# Patient Record
Sex: MALE | Race: WHITE | NOT HISPANIC OR LATINO | Employment: OTHER | URBAN - METROPOLITAN AREA
[De-identification: names, ages, dates, MRNs, and addresses within clinical notes are randomized per-mention and may not be internally consistent; named-entity substitution may affect disease eponyms.]

---

## 2021-01-01 ENCOUNTER — APPOINTMENT (OUTPATIENT)
Dept: EDUCATION SERVICES | Facility: CLINIC | Age: 56
DRG: 286 | End: 2021-01-01
Attending: NURSE PRACTITIONER
Payer: MEDICARE

## 2021-01-01 ENCOUNTER — HOME INFUSION (PRE-WILLOW HOME INFUSION) (OUTPATIENT)
Dept: PHARMACY | Facility: CLINIC | Age: 56
End: 2021-01-01
Payer: MEDICARE

## 2021-01-01 ENCOUNTER — HOME INFUSION (PRE-WILLOW HOME INFUSION) (OUTPATIENT)
Dept: PHARMACY | Facility: CLINIC | Age: 56
End: 2021-01-01

## 2021-01-01 ENCOUNTER — APPOINTMENT (OUTPATIENT)
Dept: ULTRASOUND IMAGING | Facility: CLINIC | Age: 56
DRG: 286 | End: 2021-01-01
Attending: NURSE PRACTITIONER
Payer: MEDICARE

## 2021-01-01 ENCOUNTER — PATIENT OUTREACH (OUTPATIENT)
Dept: CARDIOLOGY | Facility: CLINIC | Age: 56
End: 2021-01-01
Payer: MEDICARE

## 2021-01-01 ENCOUNTER — OFFICE VISIT (OUTPATIENT)
Dept: CARDIOLOGY | Facility: CLINIC | Age: 56
End: 2021-01-01
Attending: NURSE PRACTITIONER
Payer: MEDICARE

## 2021-01-01 ENCOUNTER — APPOINTMENT (OUTPATIENT)
Dept: GENERAL RADIOLOGY | Facility: CLINIC | Age: 56
DRG: 286 | End: 2021-01-01
Attending: STUDENT IN AN ORGANIZED HEALTH CARE EDUCATION/TRAINING PROGRAM
Payer: MEDICARE

## 2021-01-01 ENCOUNTER — TELEPHONE (OUTPATIENT)
Dept: CARDIOLOGY | Facility: CLINIC | Age: 56
End: 2021-01-01
Payer: MEDICARE

## 2021-01-01 ENCOUNTER — PATIENT OUTREACH (OUTPATIENT)
Dept: CARDIOLOGY | Facility: CLINIC | Age: 56
End: 2021-01-01

## 2021-01-01 ENCOUNTER — APPOINTMENT (OUTPATIENT)
Dept: CARDIOLOGY | Facility: CLINIC | Age: 56
DRG: 286 | End: 2021-01-01
Attending: STUDENT IN AN ORGANIZED HEALTH CARE EDUCATION/TRAINING PROGRAM
Payer: MEDICARE

## 2021-01-01 ENCOUNTER — APPOINTMENT (OUTPATIENT)
Dept: CARDIOLOGY | Facility: CLINIC | Age: 56
DRG: 286 | End: 2021-01-01
Attending: NURSE PRACTITIONER
Payer: MEDICARE

## 2021-01-01 ENCOUNTER — HOSPITAL ENCOUNTER (INPATIENT)
Facility: CLINIC | Age: 56
LOS: 11 days | Discharge: HOME HEALTH | DRG: 286 | End: 2021-11-12
Attending: STUDENT IN AN ORGANIZED HEALTH CARE EDUCATION/TRAINING PROGRAM | Admitting: INTERNAL MEDICINE
Payer: MEDICARE

## 2021-01-01 ENCOUNTER — PRE VISIT (OUTPATIENT)
Dept: RHEUMATOLOGY | Facility: CLINIC | Age: 56
End: 2021-01-01

## 2021-01-01 ENCOUNTER — NURSE TRIAGE (OUTPATIENT)
Dept: NURSING | Facility: CLINIC | Age: 56
End: 2021-01-01

## 2021-01-01 ENCOUNTER — HOSPITAL ENCOUNTER (INPATIENT)
Facility: CLINIC | Age: 56
LOS: 8 days | Discharge: HOME HEALTH | DRG: 286 | End: 2021-12-09
Attending: INTERNAL MEDICINE | Admitting: INTERNAL MEDICINE
Payer: MEDICARE

## 2021-01-01 ENCOUNTER — APPOINTMENT (OUTPATIENT)
Dept: CT IMAGING | Facility: CLINIC | Age: 56
DRG: 286 | End: 2021-01-01
Attending: NURSE PRACTITIONER
Payer: MEDICARE

## 2021-01-01 ENCOUNTER — CARE COORDINATION (OUTPATIENT)
Dept: CARDIOLOGY | Facility: CLINIC | Age: 56
End: 2021-01-01

## 2021-01-01 ENCOUNTER — LAB REQUISITION (OUTPATIENT)
Dept: LAB | Facility: CLINIC | Age: 56
End: 2021-01-01
Payer: MEDICARE

## 2021-01-01 ENCOUNTER — APPOINTMENT (OUTPATIENT)
Dept: GENERAL RADIOLOGY | Facility: CLINIC | Age: 56
DRG: 286 | End: 2021-01-01
Attending: INTERNAL MEDICINE
Payer: MEDICARE

## 2021-01-01 ENCOUNTER — APPOINTMENT (OUTPATIENT)
Dept: GENERAL RADIOLOGY | Facility: CLINIC | Age: 56
End: 2021-01-01
Attending: EMERGENCY MEDICINE
Payer: MEDICARE

## 2021-01-01 ENCOUNTER — TELEPHONE (OUTPATIENT)
Dept: RHEUMATOLOGY | Facility: CLINIC | Age: 56
End: 2021-01-01
Payer: MEDICARE

## 2021-01-01 ENCOUNTER — ALLIED HEALTH/NURSE VISIT (OUTPATIENT)
Dept: CARDIOLOGY | Facility: CLINIC | Age: 56
End: 2021-01-01
Attending: INTERNAL MEDICINE
Payer: MEDICARE

## 2021-01-01 ENCOUNTER — OFFICE VISIT (OUTPATIENT)
Dept: NEUROPSYCHOLOGY | Facility: CLINIC | Age: 56
End: 2021-01-01
Attending: INTERNAL MEDICINE
Payer: MEDICARE

## 2021-01-01 ENCOUNTER — APPOINTMENT (OUTPATIENT)
Dept: OCCUPATIONAL THERAPY | Facility: CLINIC | Age: 56
DRG: 286 | End: 2021-01-01
Attending: STUDENT IN AN ORGANIZED HEALTH CARE EDUCATION/TRAINING PROGRAM
Payer: MEDICARE

## 2021-01-01 ENCOUNTER — APPOINTMENT (OUTPATIENT)
Dept: OCCUPATIONAL THERAPY | Facility: CLINIC | Age: 56
DRG: 286 | End: 2021-01-01
Payer: MEDICARE

## 2021-01-01 ENCOUNTER — PRE VISIT (OUTPATIENT)
Dept: ORTHOPEDICS | Facility: CLINIC | Age: 56
End: 2021-01-01
Payer: MEDICARE

## 2021-01-01 ENCOUNTER — APPOINTMENT (OUTPATIENT)
Dept: PHYSICAL THERAPY | Facility: CLINIC | Age: 56
DRG: 286 | End: 2021-01-01
Attending: INTERNAL MEDICINE
Payer: MEDICARE

## 2021-01-01 ENCOUNTER — LAB (OUTPATIENT)
Dept: LAB | Facility: CLINIC | Age: 56
End: 2021-01-01
Payer: MEDICARE

## 2021-01-01 ENCOUNTER — APPOINTMENT (OUTPATIENT)
Dept: PHYSICAL THERAPY | Facility: CLINIC | Age: 56
DRG: 286 | End: 2021-01-01
Attending: NURSE PRACTITIONER
Payer: MEDICARE

## 2021-01-01 ENCOUNTER — APPOINTMENT (OUTPATIENT)
Dept: GENERAL RADIOLOGY | Facility: CLINIC | Age: 56
DRG: 286 | End: 2021-01-01
Attending: NURSE PRACTITIONER
Payer: MEDICARE

## 2021-01-01 ENCOUNTER — HOSPITAL ENCOUNTER (EMERGENCY)
Facility: CLINIC | Age: 56
Discharge: HOME | End: 2021-11-18
Attending: EMERGENCY MEDICINE | Admitting: EMERGENCY MEDICINE
Payer: MEDICARE

## 2021-01-01 ENCOUNTER — TELEPHONE (OUTPATIENT)
Dept: MULTI SPECIALTY CLINIC | Facility: CLINIC | Age: 56
End: 2021-01-01
Payer: MEDICARE

## 2021-01-01 ENCOUNTER — CARE COORDINATION (OUTPATIENT)
Dept: CARDIOLOGY | Facility: CLINIC | Age: 56
End: 2021-01-01
Payer: MEDICARE

## 2021-01-01 ENCOUNTER — PATIENT OUTREACH (OUTPATIENT)
Dept: CARE COORDINATION | Facility: CLINIC | Age: 56
End: 2021-01-01
Payer: MEDICARE

## 2021-01-01 ENCOUNTER — TELEPHONE (OUTPATIENT)
Dept: CARDIOLOGY | Facility: CLINIC | Age: 56
End: 2021-01-01

## 2021-01-01 ENCOUNTER — OFFICE VISIT (OUTPATIENT)
Dept: FAMILY MEDICINE | Facility: CLINIC | Age: 56
End: 2021-01-01
Payer: MEDICARE

## 2021-01-01 VITALS
OXYGEN SATURATION: 98 % | HEIGHT: 71 IN | DIASTOLIC BLOOD PRESSURE: 66 MMHG | HEART RATE: 95 BPM | BODY MASS INDEX: 26.54 KG/M2 | WEIGHT: 189.6 LBS | SYSTOLIC BLOOD PRESSURE: 104 MMHG

## 2021-01-01 VITALS
OXYGEN SATURATION: 100 % | SYSTOLIC BLOOD PRESSURE: 106 MMHG | RESPIRATION RATE: 12 BRPM | BODY MASS INDEX: 29.03 KG/M2 | DIASTOLIC BLOOD PRESSURE: 85 MMHG | WEIGHT: 219 LBS | TEMPERATURE: 97.9 F | HEART RATE: 97 BPM | HEIGHT: 73 IN

## 2021-01-01 VITALS
TEMPERATURE: 97.6 F | BODY MASS INDEX: 25.46 KG/M2 | WEIGHT: 193 LBS | DIASTOLIC BLOOD PRESSURE: 65 MMHG | OXYGEN SATURATION: 94 % | SYSTOLIC BLOOD PRESSURE: 99 MMHG | HEART RATE: 102 BPM | RESPIRATION RATE: 18 BRPM

## 2021-01-01 VITALS
HEIGHT: 73 IN | DIASTOLIC BLOOD PRESSURE: 67 MMHG | WEIGHT: 198.2 LBS | BODY MASS INDEX: 26.27 KG/M2 | SYSTOLIC BLOOD PRESSURE: 91 MMHG | OXYGEN SATURATION: 99 % | HEART RATE: 93 BPM

## 2021-01-01 VITALS
RESPIRATION RATE: 16 BRPM | BODY MASS INDEX: 25.87 KG/M2 | SYSTOLIC BLOOD PRESSURE: 100 MMHG | HEART RATE: 92 BPM | HEIGHT: 73 IN | WEIGHT: 195.2 LBS | TEMPERATURE: 97.6 F | OXYGEN SATURATION: 96 % | DIASTOLIC BLOOD PRESSURE: 72 MMHG

## 2021-01-01 VITALS
DIASTOLIC BLOOD PRESSURE: 63 MMHG | SYSTOLIC BLOOD PRESSURE: 93 MMHG | BODY MASS INDEX: 27.25 KG/M2 | HEIGHT: 73 IN | WEIGHT: 205.6 LBS | HEART RATE: 90 BPM | OXYGEN SATURATION: 97 %

## 2021-01-01 VITALS
HEART RATE: 97 BPM | SYSTOLIC BLOOD PRESSURE: 108 MMHG | OXYGEN SATURATION: 98 % | BODY MASS INDEX: 26.88 KG/M2 | HEIGHT: 73 IN | DIASTOLIC BLOOD PRESSURE: 73 MMHG | WEIGHT: 202.8 LBS

## 2021-01-01 VITALS
DIASTOLIC BLOOD PRESSURE: 73 MMHG | BODY MASS INDEX: 26.6 KG/M2 | WEIGHT: 201.6 LBS | SYSTOLIC BLOOD PRESSURE: 100 MMHG | HEART RATE: 63 BPM | TEMPERATURE: 97.4 F

## 2021-01-01 DIAGNOSIS — I50.23 ACUTE ON CHRONIC SYSTOLIC CONGESTIVE HEART FAILURE (H): ICD-10-CM

## 2021-01-01 DIAGNOSIS — I50.22 CHRONIC SYSTOLIC HEART FAILURE (H): ICD-10-CM

## 2021-01-01 DIAGNOSIS — I50.9 HEART FAILURE, UNSPECIFIED (H): ICD-10-CM

## 2021-01-01 DIAGNOSIS — R57.0 CARDIOGENIC SHOCK (H): Primary | ICD-10-CM

## 2021-01-01 DIAGNOSIS — Z11.52 ENCOUNTER FOR SCREENING LABORATORY TESTING FOR SEVERE ACUTE RESPIRATORY SYNDROME CORONAVIRUS 2 (SARS-COV-2): ICD-10-CM

## 2021-01-01 DIAGNOSIS — I50.9 HEART FAILURE (H): ICD-10-CM

## 2021-01-01 DIAGNOSIS — R57.0 CARDIOGENIC SHOCK (H): ICD-10-CM

## 2021-01-01 DIAGNOSIS — I50.9 HEART FAILURE (H): Primary | ICD-10-CM

## 2021-01-01 DIAGNOSIS — I50.22 CHRONIC SYSTOLIC CONGESTIVE HEART FAILURE (H): Primary | ICD-10-CM

## 2021-01-01 DIAGNOSIS — I42.8 NONISCHEMIC CARDIOMYOPATHY (H): Primary | ICD-10-CM

## 2021-01-01 DIAGNOSIS — M1A.49X1 OTHER SECONDARY CHRONIC GOUT OF MULTIPLE SITES WITH TOPHUS: ICD-10-CM

## 2021-01-01 DIAGNOSIS — F55.8 ABUSE OF OTHER NON-PSYCHOACTIVE SUBSTANCES: ICD-10-CM

## 2021-01-01 DIAGNOSIS — I42.9 CARDIOMYOPATHY (H): ICD-10-CM

## 2021-01-01 DIAGNOSIS — I50.23 ACUTE ON CHRONIC SYSTOLIC CONGESTIVE HEART FAILURE (H): Primary | ICD-10-CM

## 2021-01-01 DIAGNOSIS — M1A.9XX0 CHRONIC GOUT: ICD-10-CM

## 2021-01-01 DIAGNOSIS — I42.8 NONISCHEMIC CARDIOMYOPATHY (H): ICD-10-CM

## 2021-01-01 DIAGNOSIS — Z71.89 OTHER SPECIFIED COUNSELING: ICD-10-CM

## 2021-01-01 DIAGNOSIS — I42.9 CARDIOMYOPATHY (H): Primary | ICD-10-CM

## 2021-01-01 DIAGNOSIS — F19.10 SUBSTANCE ABUSE (H): ICD-10-CM

## 2021-01-01 DIAGNOSIS — F32.A DEPRESSION, UNSPECIFIED DEPRESSION TYPE: ICD-10-CM

## 2021-01-01 DIAGNOSIS — Z01.818 PREOP EXAMINATION: ICD-10-CM

## 2021-01-01 DIAGNOSIS — G47.09 OTHER INSOMNIA: ICD-10-CM

## 2021-01-01 DIAGNOSIS — R06.02 SHORTNESS OF BREATH: ICD-10-CM

## 2021-01-01 DIAGNOSIS — M1A.0390 CHRONIC GOUT OF WRIST, UNSPECIFIED CAUSE, UNSPECIFIED LATERALITY: ICD-10-CM

## 2021-01-01 DIAGNOSIS — F33.1 MAJOR DEPRESSIVE DISORDER, RECURRENT EPISODE, MODERATE (H): Primary | ICD-10-CM

## 2021-01-01 DIAGNOSIS — M67.439 GANGLION OF WRIST, UNSPECIFIED LATERALITY: Primary | ICD-10-CM

## 2021-01-01 DIAGNOSIS — Z12.11 SCREEN FOR COLON CANCER: ICD-10-CM

## 2021-01-01 DIAGNOSIS — I50.22 CHRONIC SYSTOLIC HEART FAILURE (H): Primary | ICD-10-CM

## 2021-01-01 DIAGNOSIS — K21.9 GASTROESOPHAGEAL REFLUX DISEASE, UNSPECIFIED WHETHER ESOPHAGITIS PRESENT: ICD-10-CM

## 2021-01-01 DIAGNOSIS — R11.0 NAUSEA: ICD-10-CM

## 2021-01-01 DIAGNOSIS — I50.23 ACUTE ON CHRONIC SYSTOLIC (CONGESTIVE) HEART FAILURE (H): ICD-10-CM

## 2021-01-01 DIAGNOSIS — M1A.00X1 IDIOPATHIC CHRONIC GOUT WITH TOPHUS, UNSPECIFIED SITE: ICD-10-CM

## 2021-01-01 DIAGNOSIS — I50.9 ACUTE ON CHRONIC CONGESTIVE HEART FAILURE, UNSPECIFIED HEART FAILURE TYPE (H): ICD-10-CM

## 2021-01-01 DIAGNOSIS — E11.9 DIABETES MELLITUS TYPE 2, NONINSULIN DEPENDENT (H): ICD-10-CM

## 2021-01-01 DIAGNOSIS — F41.9 ANXIETY: ICD-10-CM

## 2021-01-01 DIAGNOSIS — F41.9 ANXIOUSNESS: ICD-10-CM

## 2021-01-01 LAB
ABO/RH(D): NORMAL
ALBUMIN SERPL-MCNC: 2.7 G/DL (ref 3.4–5)
ALBUMIN SERPL-MCNC: 2.8 G/DL (ref 3.4–5)
ALBUMIN SERPL-MCNC: 2.9 G/DL (ref 3.4–5)
ALBUMIN SERPL-MCNC: 3 G/DL (ref 3.4–5)
ALBUMIN SERPL-MCNC: 3.1 G/DL (ref 3.4–5)
ALBUMIN SERPL-MCNC: 3.2 G/DL (ref 3.4–5)
ALBUMIN SERPL-MCNC: 3.3 G/DL (ref 3.4–5)
ALBUMIN SERPL-MCNC: 3.3 G/DL (ref 3.4–5)
ALBUMIN SERPL-MCNC: 3.4 G/DL (ref 3.4–5)
ALBUMIN SERPL-MCNC: 4 G/DL (ref 3.4–5)
ALBUMIN UR-MCNC: NEGATIVE MG/DL
ALP SERPL-CCNC: 101 U/L (ref 40–150)
ALP SERPL-CCNC: 102 U/L (ref 40–150)
ALP SERPL-CCNC: 103 U/L (ref 40–150)
ALP SERPL-CCNC: 107 U/L (ref 40–150)
ALP SERPL-CCNC: 109 U/L (ref 40–150)
ALP SERPL-CCNC: 110 U/L (ref 40–150)
ALP SERPL-CCNC: 111 U/L (ref 40–150)
ALP SERPL-CCNC: 111 U/L (ref 40–150)
ALP SERPL-CCNC: 113 U/L (ref 40–150)
ALP SERPL-CCNC: 114 U/L (ref 40–150)
ALP SERPL-CCNC: 114 U/L (ref 40–150)
ALP SERPL-CCNC: 115 U/L (ref 40–150)
ALP SERPL-CCNC: 116 U/L (ref 40–150)
ALP SERPL-CCNC: 116 U/L (ref 40–150)
ALP SERPL-CCNC: 117 U/L (ref 40–150)
ALP SERPL-CCNC: 118 U/L (ref 40–150)
ALP SERPL-CCNC: 119 U/L (ref 40–150)
ALP SERPL-CCNC: 120 U/L (ref 40–150)
ALP SERPL-CCNC: 122 U/L (ref 40–150)
ALP SERPL-CCNC: 123 U/L (ref 40–150)
ALP SERPL-CCNC: 123 U/L (ref 40–150)
ALP SERPL-CCNC: 124 U/L (ref 40–150)
ALP SERPL-CCNC: 126 U/L (ref 40–150)
ALP SERPL-CCNC: 128 U/L (ref 40–150)
ALP SERPL-CCNC: 129 U/L (ref 40–150)
ALP SERPL-CCNC: 132 U/L (ref 40–150)
ALP SERPL-CCNC: 133 U/L (ref 40–150)
ALP SERPL-CCNC: 134 U/L (ref 40–150)
ALP SERPL-CCNC: 152 U/L (ref 40–150)
ALT SERPL W P-5'-P-CCNC: 13 U/L (ref 0–70)
ALT SERPL W P-5'-P-CCNC: 14 U/L (ref 0–70)
ALT SERPL W P-5'-P-CCNC: 15 U/L (ref 0–70)
ALT SERPL W P-5'-P-CCNC: 16 U/L (ref 0–70)
ALT SERPL W P-5'-P-CCNC: 17 U/L (ref 0–70)
ALT SERPL W P-5'-P-CCNC: 17 U/L (ref 0–70)
ALT SERPL W P-5'-P-CCNC: 18 U/L (ref 0–70)
ALT SERPL W P-5'-P-CCNC: 19 U/L (ref 0–70)
ALT SERPL W P-5'-P-CCNC: 21 U/L (ref 0–70)
ALT SERPL W P-5'-P-CCNC: 21 U/L (ref 0–70)
ALT SERPL W P-5'-P-CCNC: 22 U/L (ref 0–70)
ALT SERPL W P-5'-P-CCNC: 25 U/L (ref 0–70)
ALT SERPL W P-5'-P-CCNC: 25 U/L (ref 0–70)
ALT SERPL W P-5'-P-CCNC: 26 U/L (ref 0–70)
ALT SERPL W P-5'-P-CCNC: 29 U/L (ref 0–70)
ALT SERPL W P-5'-P-CCNC: 31 U/L (ref 0–70)
ALT SERPL W P-5'-P-CCNC: 32 U/L (ref 0–70)
AMPHETAMINES UR QL SCN: NORMAL
AMYLASE SERPL-CCNC: 23 U/L (ref 30–110)
ANION GAP SERPL CALCULATED.3IONS-SCNC: 10 MMOL/L (ref 3–14)
ANION GAP SERPL CALCULATED.3IONS-SCNC: 11 MMOL/L (ref 3–14)
ANION GAP SERPL CALCULATED.3IONS-SCNC: 12 MMOL/L (ref 3–14)
ANION GAP SERPL CALCULATED.3IONS-SCNC: 4 MMOL/L (ref 3–14)
ANION GAP SERPL CALCULATED.3IONS-SCNC: 4 MMOL/L (ref 3–14)
ANION GAP SERPL CALCULATED.3IONS-SCNC: 5 MMOL/L (ref 3–14)
ANION GAP SERPL CALCULATED.3IONS-SCNC: 5 MMOL/L (ref 3–14)
ANION GAP SERPL CALCULATED.3IONS-SCNC: 6 MMOL/L (ref 3–14)
ANION GAP SERPL CALCULATED.3IONS-SCNC: 7 MMOL/L (ref 3–14)
ANION GAP SERPL CALCULATED.3IONS-SCNC: 8 MMOL/L (ref 3–14)
ANION GAP SERPL CALCULATED.3IONS-SCNC: 9 MMOL/L (ref 3–14)
ANTIBODY SCREEN: NEGATIVE
APPEARANCE UR: CLEAR
APTT PPP: 35 SECONDS (ref 22–38)
AST SERPL W P-5'-P-CCNC: 11 U/L (ref 0–45)
AST SERPL W P-5'-P-CCNC: 13 U/L (ref 0–45)
AST SERPL W P-5'-P-CCNC: 15 U/L (ref 0–45)
AST SERPL W P-5'-P-CCNC: 15 U/L (ref 0–45)
AST SERPL W P-5'-P-CCNC: 16 U/L (ref 0–45)
AST SERPL W P-5'-P-CCNC: 17 U/L (ref 0–45)
AST SERPL W P-5'-P-CCNC: 18 U/L (ref 0–45)
AST SERPL W P-5'-P-CCNC: 18 U/L (ref 0–45)
AST SERPL W P-5'-P-CCNC: 19 U/L (ref 0–45)
AST SERPL W P-5'-P-CCNC: 20 U/L (ref 0–45)
AST SERPL W P-5'-P-CCNC: 21 U/L (ref 0–45)
AST SERPL W P-5'-P-CCNC: 22 U/L (ref 0–45)
AST SERPL W P-5'-P-CCNC: 24 U/L (ref 0–45)
AST SERPL W P-5'-P-CCNC: 24 U/L (ref 0–45)
AST SERPL W P-5'-P-CCNC: 26 U/L (ref 0–45)
AST SERPL W P-5'-P-CCNC: 27 U/L (ref 0–45)
AST SERPL W P-5'-P-CCNC: 37 U/L (ref 0–45)
AST SERPL W P-5'-P-CCNC: 41 U/L (ref 0–45)
AST SERPL W P-5'-P-CCNC: 9 U/L (ref 0–45)
ATRIAL RATE - MUSE: 102 BPM
ATRIAL RATE - MUSE: 109 BPM
ATRIAL RATE - MUSE: 116 BPM
ATRIAL RATE - MUSE: 86 BPM
ATRIAL RATE - MUSE: 91 BPM
ATRIAL RATE - MUSE: 94 BPM
BARBITURATES UR QL: NORMAL
BASE EXCESS BLDV CALC-SCNC: -0.1 MMOL/L (ref -7.7–1.9)
BASE EXCESS BLDV CALC-SCNC: -0.1 MMOL/L (ref -7.7–1.9)
BASE EXCESS BLDV CALC-SCNC: -0.2 MMOL/L (ref -7.7–1.9)
BASE EXCESS BLDV CALC-SCNC: -0.3 MMOL/L (ref -7.7–1.9)
BASE EXCESS BLDV CALC-SCNC: -3 MMOL/L (ref -7.7–1.9)
BASE EXCESS BLDV CALC-SCNC: -7 MMOL/L (ref -7.7–1.9)
BASE EXCESS BLDV CALC-SCNC: 0 MMOL/L (ref -7.7–1.9)
BASE EXCESS BLDV CALC-SCNC: 0.3 MMOL/L (ref -7.7–1.9)
BASE EXCESS BLDV CALC-SCNC: 0.4 MMOL/L (ref -7.7–1.9)
BASE EXCESS BLDV CALC-SCNC: 1.1 MMOL/L (ref -7.7–1.9)
BASE EXCESS BLDV CALC-SCNC: 1.2 MMOL/L (ref -7.7–1.9)
BASE EXCESS BLDV CALC-SCNC: 1.3 MMOL/L (ref -7.7–1.9)
BASE EXCESS BLDV CALC-SCNC: 1.6 MMOL/L (ref -7.7–1.9)
BASE EXCESS BLDV CALC-SCNC: 1.6 MMOL/L (ref -7.7–1.9)
BASE EXCESS BLDV CALC-SCNC: 1.8 MMOL/L (ref -7.7–1.9)
BASE EXCESS BLDV CALC-SCNC: 1.9 MMOL/L (ref -7.7–1.9)
BASE EXCESS BLDV CALC-SCNC: 2 MMOL/L (ref -7.7–1.9)
BASE EXCESS BLDV CALC-SCNC: 2.1 MMOL/L (ref -7.7–1.9)
BASE EXCESS BLDV CALC-SCNC: 2.3 MMOL/L (ref -7.7–1.9)
BASE EXCESS BLDV CALC-SCNC: 2.4 MMOL/L (ref -7.7–1.9)
BASE EXCESS BLDV CALC-SCNC: 2.5 MMOL/L (ref -7.7–1.9)
BASE EXCESS BLDV CALC-SCNC: 2.5 MMOL/L (ref -7.7–1.9)
BASE EXCESS BLDV CALC-SCNC: 2.6 MMOL/L (ref -7.7–1.9)
BASE EXCESS BLDV CALC-SCNC: 2.7 MMOL/L (ref -7.7–1.9)
BASE EXCESS BLDV CALC-SCNC: 2.8 MMOL/L (ref -7.7–1.9)
BASE EXCESS BLDV CALC-SCNC: 2.8 MMOL/L (ref -7.7–1.9)
BASE EXCESS BLDV CALC-SCNC: 3 MMOL/L (ref -7.7–1.9)
BASE EXCESS BLDV CALC-SCNC: 3.2 MMOL/L (ref -7.7–1.9)
BASE EXCESS BLDV CALC-SCNC: 3.3 MMOL/L (ref -7.7–1.9)
BASE EXCESS BLDV CALC-SCNC: 3.3 MMOL/L (ref -7.7–1.9)
BASE EXCESS BLDV CALC-SCNC: 3.5 MMOL/L (ref -7.7–1.9)
BASE EXCESS BLDV CALC-SCNC: 3.6 MMOL/L (ref -7.7–1.9)
BASE EXCESS BLDV CALC-SCNC: 3.7 MMOL/L (ref -7.7–1.9)
BASE EXCESS BLDV CALC-SCNC: 3.9 MMOL/L (ref -7.7–1.9)
BASE EXCESS BLDV CALC-SCNC: 3.9 MMOL/L (ref -7.7–1.9)
BASE EXCESS BLDV CALC-SCNC: 4.3 MMOL/L (ref -7.7–1.9)
BASE EXCESS BLDV CALC-SCNC: 4.5 MMOL/L (ref -7.7–1.9)
BASE EXCESS BLDV CALC-SCNC: 4.6 MMOL/L (ref -7.7–1.9)
BASE EXCESS BLDV CALC-SCNC: 4.9 MMOL/L (ref -7.7–1.9)
BASE EXCESS BLDV CALC-SCNC: 5.6 MMOL/L (ref -7.7–1.9)
BASE EXCESS BLDV CALC-SCNC: 6.3 MMOL/L (ref -7.7–1.9)
BASOPHILS # BLD AUTO: 0.1 10E3/UL (ref 0–0.2)
BASOPHILS # BLD AUTO: 0.1 10E3/UL (ref 0–0.2)
BASOPHILS NFR BLD AUTO: 1 %
BASOPHILS NFR BLD AUTO: 1 %
BENZODIAZ UR QL: NORMAL
BILIRUB DIRECT SERPL-MCNC: 0.3 MG/DL (ref 0–0.2)
BILIRUB SERPL-MCNC: 0.5 MG/DL (ref 0.2–1.3)
BILIRUB SERPL-MCNC: 0.5 MG/DL (ref 0.2–1.3)
BILIRUB SERPL-MCNC: 0.6 MG/DL (ref 0.2–1.3)
BILIRUB SERPL-MCNC: 0.7 MG/DL (ref 0.2–1.3)
BILIRUB SERPL-MCNC: 0.8 MG/DL (ref 0.2–1.3)
BILIRUB SERPL-MCNC: 1 MG/DL (ref 0.2–1.3)
BILIRUB SERPL-MCNC: 1.1 MG/DL (ref 0.2–1.3)
BILIRUB SERPL-MCNC: 1.2 MG/DL (ref 0.2–1.3)
BILIRUB SERPL-MCNC: 1.2 MG/DL (ref 0.2–1.3)
BILIRUB SERPL-MCNC: 1.3 MG/DL (ref 0.2–1.3)
BILIRUB SERPL-MCNC: 1.4 MG/DL (ref 0.2–1.3)
BILIRUB SERPL-MCNC: 1.5 MG/DL (ref 0.2–1.3)
BILIRUB SERPL-MCNC: 1.6 MG/DL (ref 0.2–1.3)
BILIRUB SERPL-MCNC: 1.8 MG/DL (ref 0.2–1.3)
BILIRUB SERPL-MCNC: 1.9 MG/DL (ref 0.2–1.3)
BILIRUB SERPL-MCNC: 2.1 MG/DL (ref 0.2–1.3)
BILIRUB SERPL-MCNC: 2.1 MG/DL (ref 0.2–1.3)
BILIRUB SERPL-MCNC: 2.3 MG/DL (ref 0.2–1.3)
BILIRUB UR QL STRIP: NEGATIVE
BUN SERPL-MCNC: 10 MG/DL (ref 7–30)
BUN SERPL-MCNC: 11 MG/DL (ref 7–30)
BUN SERPL-MCNC: 11 MG/DL (ref 7–30)
BUN SERPL-MCNC: 12 MG/DL (ref 7–30)
BUN SERPL-MCNC: 13 MG/DL (ref 7–30)
BUN SERPL-MCNC: 13 MG/DL (ref 7–30)
BUN SERPL-MCNC: 14 MG/DL (ref 7–30)
BUN SERPL-MCNC: 15 MG/DL (ref 7–30)
BUN SERPL-MCNC: 16 MG/DL (ref 7–30)
BUN SERPL-MCNC: 16 MG/DL (ref 7–30)
BUN SERPL-MCNC: 17 MG/DL (ref 7–30)
BUN SERPL-MCNC: 18 MG/DL (ref 7–30)
BUN SERPL-MCNC: 19 MG/DL (ref 7–30)
BUN SERPL-MCNC: 21 MG/DL (ref 7–30)
BUN SERPL-MCNC: 23 MG/DL (ref 7–30)
BUN SERPL-MCNC: 24 MG/DL (ref 7–30)
BUN SERPL-MCNC: 27 MG/DL (ref 7–30)
BUN SERPL-MCNC: 8 MG/DL (ref 7–30)
BUN SERPL-MCNC: 8 MG/DL (ref 7–30)
BUN SERPL-MCNC: 9 MG/DL (ref 7–30)
BUN SERPL-MCNC: 9 MG/DL (ref 7–30)
CA-I BLD-MCNC: 4.6 MG/DL (ref 4.4–5.2)
CALCIUM SERPL-MCNC: 8.1 MG/DL (ref 8.5–10.1)
CALCIUM SERPL-MCNC: 8.1 MG/DL (ref 8.5–10.1)
CALCIUM SERPL-MCNC: 8.2 MG/DL (ref 8.5–10.1)
CALCIUM SERPL-MCNC: 8.3 MG/DL (ref 8.5–10.1)
CALCIUM SERPL-MCNC: 8.4 MG/DL (ref 8.5–10.1)
CALCIUM SERPL-MCNC: 8.5 MG/DL (ref 8.5–10.1)
CALCIUM SERPL-MCNC: 8.6 MG/DL (ref 8.5–10.1)
CALCIUM SERPL-MCNC: 8.7 MG/DL (ref 8.5–10.1)
CALCIUM SERPL-MCNC: 8.8 MG/DL (ref 8.5–10.1)
CALCIUM SERPL-MCNC: 8.9 MG/DL (ref 8.5–10.1)
CALCIUM SERPL-MCNC: 9 MG/DL (ref 8.5–10.1)
CALCIUM SERPL-MCNC: 9.1 MG/DL (ref 8.5–10.1)
CALCIUM SERPL-MCNC: 9.2 MG/DL (ref 8.5–10.1)
CALCIUM SERPL-MCNC: 9.3 MG/DL (ref 8.5–10.1)
CALCIUM SERPL-MCNC: 9.4 MG/DL (ref 8.5–10.1)
CALCIUM SERPL-MCNC: 9.5 MG/DL (ref 8.5–10.1)
CALCIUM SERPL-MCNC: 9.6 MG/DL (ref 8.5–10.1)
CANNABINOIDS UR QL SCN: NORMAL
CHLORIDE BLD-SCNC: 100 MMOL/L (ref 94–109)
CHLORIDE BLD-SCNC: 101 MMOL/L (ref 94–109)
CHLORIDE BLD-SCNC: 102 MMOL/L (ref 94–109)
CHLORIDE BLD-SCNC: 103 MMOL/L (ref 94–109)
CHLORIDE BLD-SCNC: 104 MMOL/L (ref 94–109)
CHLORIDE BLD-SCNC: 105 MMOL/L (ref 94–109)
CHLORIDE BLD-SCNC: 106 MMOL/L (ref 94–109)
CHLORIDE BLD-SCNC: 106 MMOL/L (ref 94–109)
CHLORIDE BLD-SCNC: 107 MMOL/L (ref 94–109)
CHLORIDE BLD-SCNC: 108 MMOL/L (ref 94–109)
CHLORIDE BLD-SCNC: 109 MMOL/L (ref 94–109)
CHLORIDE BLD-SCNC: 93 MMOL/L (ref 94–109)
CHLORIDE BLD-SCNC: 97 MMOL/L (ref 94–109)
CHLORIDE BLD-SCNC: 98 MMOL/L (ref 94–109)
CHLORIDE BLD-SCNC: 98 MMOL/L (ref 94–109)
CHLORIDE BLD-SCNC: 99 MMOL/L (ref 94–109)
CHLORIDE BLD-SCNC: 99 MMOL/L (ref 94–109)
CHOLEST SERPL-MCNC: 84 MG/DL
CHOLEST SERPL-MCNC: 85 MG/DL
CHOLEST SERPL-MCNC: 90 MG/DL
CO2 SERPL-SCNC: 19 MMOL/L (ref 20–32)
CO2 SERPL-SCNC: 22 MMOL/L (ref 20–32)
CO2 SERPL-SCNC: 23 MMOL/L (ref 20–32)
CO2 SERPL-SCNC: 24 MMOL/L (ref 20–32)
CO2 SERPL-SCNC: 25 MMOL/L (ref 20–32)
CO2 SERPL-SCNC: 26 MMOL/L (ref 20–32)
CO2 SERPL-SCNC: 27 MMOL/L (ref 20–32)
CO2 SERPL-SCNC: 28 MMOL/L (ref 20–32)
CO2 SERPL-SCNC: 29 MMOL/L (ref 20–32)
CO2 SERPL-SCNC: 30 MMOL/L (ref 20–32)
CO2 SERPL-SCNC: 31 MMOL/L (ref 20–32)
COCAINE UR QL: NORMAL
COLONOSCOPY: NORMAL
COLOR UR AUTO: YELLOW
COTININE SERPL-MCNC: <1 NG/ML
CPB POCT: NO
CREAT SERPL-MCNC: 0.98 MG/DL (ref 0.66–1.25)
CREAT SERPL-MCNC: 1.01 MG/DL (ref 0.66–1.25)
CREAT SERPL-MCNC: 1.04 MG/DL (ref 0.66–1.25)
CREAT SERPL-MCNC: 1.06 MG/DL (ref 0.66–1.25)
CREAT SERPL-MCNC: 1.09 MG/DL (ref 0.66–1.25)
CREAT SERPL-MCNC: 1.11 MG/DL (ref 0.66–1.25)
CREAT SERPL-MCNC: 1.12 MG/DL (ref 0.66–1.25)
CREAT SERPL-MCNC: 1.13 MG/DL (ref 0.66–1.25)
CREAT SERPL-MCNC: 1.14 MG/DL (ref 0.66–1.25)
CREAT SERPL-MCNC: 1.15 MG/DL (ref 0.66–1.25)
CREAT SERPL-MCNC: 1.15 MG/DL (ref 0.66–1.25)
CREAT SERPL-MCNC: 1.16 MG/DL (ref 0.66–1.25)
CREAT SERPL-MCNC: 1.18 MG/DL (ref 0.66–1.25)
CREAT SERPL-MCNC: 1.19 MG/DL (ref 0.66–1.25)
CREAT SERPL-MCNC: 1.19 MG/DL (ref 0.66–1.25)
CREAT SERPL-MCNC: 1.2 MG/DL (ref 0.66–1.25)
CREAT SERPL-MCNC: 1.21 MG/DL (ref 0.66–1.25)
CREAT SERPL-MCNC: 1.22 MG/DL (ref 0.66–1.25)
CREAT SERPL-MCNC: 1.22 MG/DL (ref 0.66–1.25)
CREAT SERPL-MCNC: 1.23 MG/DL (ref 0.66–1.25)
CREAT SERPL-MCNC: 1.25 MG/DL (ref 0.66–1.25)
CREAT SERPL-MCNC: 1.25 MG/DL (ref 0.66–1.25)
CREAT SERPL-MCNC: 1.26 MG/DL (ref 0.66–1.25)
CREAT SERPL-MCNC: 1.26 MG/DL (ref 0.66–1.25)
CREAT SERPL-MCNC: 1.27 MG/DL (ref 0.66–1.25)
CREAT SERPL-MCNC: 1.31 MG/DL (ref 0.66–1.25)
CREAT SERPL-MCNC: 1.31 MG/DL (ref 0.66–1.25)
CREAT SERPL-MCNC: 1.37 MG/DL (ref 0.66–1.25)
CREAT SERPL-MCNC: 1.39 MG/DL (ref 0.66–1.25)
CREAT SERPL-MCNC: 1.4 MG/DL (ref 0.66–1.25)
CREAT SERPL-MCNC: 1.47 MG/DL (ref 0.66–1.25)
CREAT SERPL-MCNC: 1.52 MG/DL (ref 0.66–1.25)
CREAT SERPL-MCNC: 1.57 MG/DL (ref 0.66–1.25)
CREAT SERPL-MCNC: 1.58 MG/DL (ref 0.66–1.25)
CREAT SERPL-MCNC: 1.62 MG/DL (ref 0.66–1.25)
CREAT SERPL-MCNC: 1.67 MG/DL (ref 0.66–1.25)
CREAT UR-MCNC: 13 MG/DL
CYSTATIN C SERPL-MCNC: 1.4 MG/L
DIASTOLIC BLOOD PRESSURE - MUSE: NORMAL MMHG
DRVVT SCREEN RATIO: 1
EOSINOPHIL # BLD AUTO: 0.1 10E3/UL (ref 0–0.7)
EOSINOPHIL # BLD AUTO: 0.4 10E3/UL (ref 0–0.7)
EOSINOPHIL NFR BLD AUTO: 1 %
EOSINOPHIL NFR BLD AUTO: 5 %
ERYTHROCYTE [DISTWIDTH] IN BLOOD BY AUTOMATED COUNT: 18.7 % (ref 10–15)
ERYTHROCYTE [DISTWIDTH] IN BLOOD BY AUTOMATED COUNT: 18.7 % (ref 10–15)
ERYTHROCYTE [DISTWIDTH] IN BLOOD BY AUTOMATED COUNT: 18.9 % (ref 10–15)
ERYTHROCYTE [DISTWIDTH] IN BLOOD BY AUTOMATED COUNT: 18.9 % (ref 10–15)
ERYTHROCYTE [DISTWIDTH] IN BLOOD BY AUTOMATED COUNT: 19.3 % (ref 10–15)
ERYTHROCYTE [DISTWIDTH] IN BLOOD BY AUTOMATED COUNT: 19.8 % (ref 10–15)
ERYTHROCYTE [DISTWIDTH] IN BLOOD BY AUTOMATED COUNT: 20.1 % (ref 10–15)
ERYTHROCYTE [DISTWIDTH] IN BLOOD BY AUTOMATED COUNT: 21 % (ref 10–15)
ERYTHROCYTE [DISTWIDTH] IN BLOOD BY AUTOMATED COUNT: 21.2 % (ref 10–15)
ERYTHROCYTE [DISTWIDTH] IN BLOOD BY AUTOMATED COUNT: 21.9 % (ref 10–15)
ERYTHROCYTE [DISTWIDTH] IN BLOOD BY AUTOMATED COUNT: 22.3 % (ref 10–15)
ERYTHROCYTE [DISTWIDTH] IN BLOOD BY AUTOMATED COUNT: 22.7 % (ref 10–15)
ERYTHROCYTE [DISTWIDTH] IN BLOOD BY AUTOMATED COUNT: 24 % (ref 10–15)
ERYTHROCYTE [DISTWIDTH] IN BLOOD BY AUTOMATED COUNT: 24.1 % (ref 10–15)
ERYTHROCYTE [DISTWIDTH] IN BLOOD BY AUTOMATED COUNT: 24.2 % (ref 10–15)
ERYTHROCYTE [DISTWIDTH] IN BLOOD BY AUTOMATED COUNT: 24.3 % (ref 10–15)
ERYTHROCYTE [DISTWIDTH] IN BLOOD BY AUTOMATED COUNT: 24.3 % (ref 10–15)
ERYTHROCYTE [DISTWIDTH] IN BLOOD BY AUTOMATED COUNT: 24.4 % (ref 10–15)
ERYTHROCYTE [DISTWIDTH] IN BLOOD BY AUTOMATED COUNT: 24.6 % (ref 10–15)
ERYTHROCYTE [DISTWIDTH] IN BLOOD BY AUTOMATED COUNT: 24.6 % (ref 10–15)
ERYTHROCYTE [DISTWIDTH] IN BLOOD BY AUTOMATED COUNT: 24.8 % (ref 10–15)
ERYTHROCYTE [DISTWIDTH] IN BLOOD BY AUTOMATED COUNT: 25.1 % (ref 10–15)
ETHANOL UR QL SCN: NORMAL
FASTING STATUS PATIENT QL REPORTED: NO
FERRITIN SERPL-MCNC: 24 NG/ML (ref 26–388)
FERRITIN SERPL-MCNC: 58 NG/ML (ref 26–388)
FLUAV RNA SPEC QL NAA+PROBE: NEGATIVE
FLUBV RNA RESP QL NAA+PROBE: NEGATIVE
GFR SERPL CREATININE-BSD FRML MDRD: 46 ML/MIN/1.73M2
GFR SERPL CREATININE-BSD FRML MDRD: 47 ML/MIN/1.73M2
GFR SERPL CREATININE-BSD FRML MDRD: 49 ML/MIN/1.73M2
GFR SERPL CREATININE-BSD FRML MDRD: 49 ML/MIN/1.73M2
GFR SERPL CREATININE-BSD FRML MDRD: 51 ML/MIN/1.73M2
GFR SERPL CREATININE-BSD FRML MDRD: 53 ML/MIN/1.73M2
GFR SERPL CREATININE-BSD FRML MDRD: 57 ML/MIN/1.73M2
GFR SERPL CREATININE-BSD FRML MDRD: 57 ML/MIN/1.73M2
GFR SERPL CREATININE-BSD FRML MDRD: 58 ML/MIN/1.73M2
GFR SERPL CREATININE-BSD FRML MDRD: 61 ML/MIN/1.73M2
GFR SERPL CREATININE-BSD FRML MDRD: 61 ML/MIN/1.73M2
GFR SERPL CREATININE-BSD FRML MDRD: 64 ML/MIN/1.73M2
GFR SERPL CREATININE-BSD FRML MDRD: 65 ML/MIN/1.73M2
GFR SERPL CREATININE-BSD FRML MDRD: 65 ML/MIN/1.73M2
GFR SERPL CREATININE-BSD FRML MDRD: 66 ML/MIN/1.73M2
GFR SERPL CREATININE-BSD FRML MDRD: 67 ML/MIN/1.73M2
GFR SERPL CREATININE-BSD FRML MDRD: 68 ML/MIN/1.73M2
GFR SERPL CREATININE-BSD FRML MDRD: 69 ML/MIN/1.73M2
GFR SERPL CREATININE-BSD FRML MDRD: 69 ML/MIN/1.73M2
GFR SERPL CREATININE-BSD FRML MDRD: 70 ML/MIN/1.73M2
GFR SERPL CREATININE-BSD FRML MDRD: 71 ML/MIN/1.73M2
GFR SERPL CREATININE-BSD FRML MDRD: 71 ML/MIN/1.73M2
GFR SERPL CREATININE-BSD FRML MDRD: 72 ML/MIN/1.73M2
GFR SERPL CREATININE-BSD FRML MDRD: 72 ML/MIN/1.73M2
GFR SERPL CREATININE-BSD FRML MDRD: 73 ML/MIN/1.73M2
GFR SERPL CREATININE-BSD FRML MDRD: 74 ML/MIN/1.73M2
GFR SERPL CREATININE-BSD FRML MDRD: 75 ML/MIN/1.73M2
GFR SERPL CREATININE-BSD FRML MDRD: 77 ML/MIN/1.73M2
GFR SERPL CREATININE-BSD FRML MDRD: 79 ML/MIN/1.73M2
GFR SERPL CREATININE-BSD FRML MDRD: 81 ML/MIN/1.73M2
GFR SERPL CREATININE-BSD FRML MDRD: 84 ML/MIN/1.73M2
GFR SERPL CREATININE-BSD FRML MDRD: 87 ML/MIN/1.73M2
GFR/BSA.PRED SERPLBLD CYS-BASED-ARV: 51 ML/MIN/BSA
GLUCOSE BLD-MCNC: 101 MG/DL (ref 70–99)
GLUCOSE BLD-MCNC: 101 MG/DL (ref 70–99)
GLUCOSE BLD-MCNC: 103 MG/DL (ref 70–99)
GLUCOSE BLD-MCNC: 105 MG/DL (ref 70–99)
GLUCOSE BLD-MCNC: 106 MG/DL (ref 70–99)
GLUCOSE BLD-MCNC: 106 MG/DL (ref 70–99)
GLUCOSE BLD-MCNC: 109 MG/DL (ref 70–99)
GLUCOSE BLD-MCNC: 111 MG/DL (ref 70–99)
GLUCOSE BLD-MCNC: 116 MG/DL (ref 70–99)
GLUCOSE BLD-MCNC: 116 MG/DL (ref 70–99)
GLUCOSE BLD-MCNC: 120 MG/DL (ref 70–99)
GLUCOSE BLD-MCNC: 121 MG/DL (ref 70–99)
GLUCOSE BLD-MCNC: 122 MG/DL (ref 70–99)
GLUCOSE BLD-MCNC: 122 MG/DL (ref 70–99)
GLUCOSE BLD-MCNC: 124 MG/DL (ref 70–99)
GLUCOSE BLD-MCNC: 129 MG/DL (ref 70–99)
GLUCOSE BLD-MCNC: 132 MG/DL (ref 70–99)
GLUCOSE BLD-MCNC: 138 MG/DL (ref 70–99)
GLUCOSE BLD-MCNC: 139 MG/DL (ref 70–99)
GLUCOSE BLD-MCNC: 142 MG/DL (ref 70–99)
GLUCOSE BLD-MCNC: 143 MG/DL (ref 70–99)
GLUCOSE BLD-MCNC: 154 MG/DL (ref 70–99)
GLUCOSE BLD-MCNC: 68 MG/DL (ref 70–99)
GLUCOSE BLD-MCNC: 70 MG/DL (ref 70–99)
GLUCOSE BLD-MCNC: 70 MG/DL (ref 70–99)
GLUCOSE BLD-MCNC: 75 MG/DL (ref 70–99)
GLUCOSE BLD-MCNC: 75 MG/DL (ref 70–99)
GLUCOSE BLD-MCNC: 76 MG/DL (ref 70–99)
GLUCOSE BLD-MCNC: 77 MG/DL (ref 70–99)
GLUCOSE BLD-MCNC: 77 MG/DL (ref 70–99)
GLUCOSE BLD-MCNC: 81 MG/DL (ref 70–99)
GLUCOSE BLD-MCNC: 81 MG/DL (ref 70–99)
GLUCOSE BLD-MCNC: 85 MG/DL (ref 70–99)
GLUCOSE BLD-MCNC: 86 MG/DL (ref 70–99)
GLUCOSE BLD-MCNC: 87 MG/DL (ref 70–99)
GLUCOSE BLD-MCNC: 87 MG/DL (ref 70–99)
GLUCOSE BLD-MCNC: 88 MG/DL (ref 70–99)
GLUCOSE BLD-MCNC: 89 MG/DL (ref 70–99)
GLUCOSE BLD-MCNC: 90 MG/DL (ref 70–99)
GLUCOSE BLD-MCNC: 90 MG/DL (ref 70–99)
GLUCOSE BLD-MCNC: 91 MG/DL (ref 70–99)
GLUCOSE BLD-MCNC: 93 MG/DL (ref 70–99)
GLUCOSE BLD-MCNC: 93 MG/DL (ref 70–99)
GLUCOSE BLD-MCNC: 94 MG/DL (ref 70–99)
GLUCOSE BLD-MCNC: 96 MG/DL (ref 70–99)
GLUCOSE BLD-MCNC: 97 MG/DL (ref 70–99)
GLUCOSE BLD-MCNC: 97 MG/DL (ref 70–99)
GLUCOSE BLDC GLUCOMTR-MCNC: 104 MG/DL (ref 70–99)
GLUCOSE BLDC GLUCOMTR-MCNC: 120 MG/DL (ref 70–99)
GLUCOSE BLDC GLUCOMTR-MCNC: 129 MG/DL (ref 70–99)
GLUCOSE BLDC GLUCOMTR-MCNC: 131 MG/DL (ref 70–99)
GLUCOSE BLDC GLUCOMTR-MCNC: 134 MG/DL (ref 70–99)
GLUCOSE BLDC GLUCOMTR-MCNC: 196 MG/DL (ref 70–99)
GLUCOSE BLDC GLUCOMTR-MCNC: 86 MG/DL (ref 70–99)
GLUCOSE BLDC GLUCOMTR-MCNC: 87 MG/DL (ref 70–99)
GLUCOSE BLDC GLUCOMTR-MCNC: 98 MG/DL (ref 70–99)
GLUCOSE UR STRIP-MCNC: NEGATIVE MG/DL
HBA1C MFR BLD: 5.7 % (ref 0–5.6)
HBA1C MFR BLD: 6.7 % (ref 0–5.6)
HCO3 BLDV-SCNC: 17 MMOL/L (ref 21–28)
HCO3 BLDV-SCNC: 21 MMOL/L (ref 21–28)
HCO3 BLDV-SCNC: 23 MMOL/L (ref 21–28)
HCO3 BLDV-SCNC: 24 MMOL/L (ref 21–28)
HCO3 BLDV-SCNC: 25 MMOL/L (ref 21–28)
HCO3 BLDV-SCNC: 26 MMOL/L (ref 21–28)
HCO3 BLDV-SCNC: 27 MMOL/L (ref 21–28)
HCO3 BLDV-SCNC: 28 MMOL/L (ref 21–28)
HCO3 BLDV-SCNC: 29 MMOL/L (ref 21–28)
HCO3 BLDV-SCNC: 30 MMOL/L (ref 21–28)
HCO3 BLDV-SCNC: 31 MMOL/L (ref 21–28)
HCT VFR BLD AUTO: 30.1 % (ref 40–53)
HCT VFR BLD AUTO: 30.2 % (ref 40–53)
HCT VFR BLD AUTO: 30.8 % (ref 40–53)
HCT VFR BLD AUTO: 31 % (ref 40–53)
HCT VFR BLD AUTO: 32.1 % (ref 40–53)
HCT VFR BLD AUTO: 33.7 % (ref 40–53)
HCT VFR BLD AUTO: 34.2 % (ref 40–53)
HCT VFR BLD AUTO: 34.6 % (ref 40–53)
HCT VFR BLD AUTO: 35 % (ref 40–53)
HCT VFR BLD AUTO: 35.2 % (ref 40–53)
HCT VFR BLD AUTO: 35.9 % (ref 40–53)
HCT VFR BLD AUTO: 36.4 % (ref 40–53)
HCT VFR BLD AUTO: 36.6 % (ref 40–53)
HCT VFR BLD AUTO: 36.7 % (ref 40–53)
HCT VFR BLD AUTO: 36.8 % (ref 40–53)
HCT VFR BLD AUTO: 37.1 % (ref 40–53)
HCT VFR BLD AUTO: 37.3 % (ref 40–53)
HCT VFR BLD AUTO: 37.8 % (ref 40–53)
HCT VFR BLD AUTO: 38 % (ref 40–53)
HCT VFR BLD AUTO: 38.9 % (ref 40–53)
HCT VFR BLD AUTO: 39.2 % (ref 40–53)
HCT VFR BLD AUTO: 39.5 % (ref 40–53)
HCT VFR BLD CALC: 35 % (ref 40–53)
HDLC SERPL-MCNC: 22 MG/DL
HDLC SERPL-MCNC: 25 MG/DL
HDLC SERPL-MCNC: 27 MG/DL
HGB BLD-MCNC: 10 G/DL (ref 13.3–17.7)
HGB BLD-MCNC: 10.1 G/DL (ref 13.3–17.7)
HGB BLD-MCNC: 10.4 G/DL (ref 13.3–17.7)
HGB BLD-MCNC: 10.5 G/DL (ref 13.3–17.7)
HGB BLD-MCNC: 10.5 G/DL (ref 13.3–17.7)
HGB BLD-MCNC: 10.7 G/DL (ref 13.3–17.7)
HGB BLD-MCNC: 10.7 G/DL (ref 13.3–17.7)
HGB BLD-MCNC: 10.8 G/DL (ref 13.3–17.7)
HGB BLD-MCNC: 10.9 G/DL (ref 13.3–17.7)
HGB BLD-MCNC: 10.9 G/DL (ref 13.3–17.7)
HGB BLD-MCNC: 11.3 G/DL (ref 13.3–17.7)
HGB BLD-MCNC: 11.3 G/DL (ref 13.3–17.7)
HGB BLD-MCNC: 11.4 G/DL (ref 13.3–17.7)
HGB BLD-MCNC: 11.6 G/DL (ref 13.3–17.7)
HGB BLD-MCNC: 11.7 G/DL (ref 13.3–17.7)
HGB BLD-MCNC: 11.8 G/DL (ref 13.3–17.7)
HGB BLD-MCNC: 11.8 G/DL (ref 13.3–17.7)
HGB BLD-MCNC: 11.9 G/DL (ref 13.3–17.7)
HGB BLD-MCNC: 12 G/DL (ref 13.3–17.7)
HGB BLD-MCNC: 12 G/DL (ref 13.3–17.7)
HGB BLD-MCNC: 12.3 G/DL (ref 13.3–17.7)
HGB BLD-MCNC: 9.2 G/DL (ref 13.3–17.7)
HGB BLD-MCNC: 9.2 G/DL (ref 13.3–17.7)
HGB BLD-MCNC: 9.3 G/DL (ref 13.3–17.7)
HGB BLD-MCNC: 9.5 G/DL (ref 13.3–17.7)
HGB BLD-MCNC: 9.7 G/DL (ref 13.3–17.7)
HGB BLD-MCNC: 9.9 G/DL (ref 13.3–17.7)
HGB UR QL STRIP: NEGATIVE
HOLD SPECIMEN: NORMAL
HYALINE CASTS: 4 /LPF
IMM GRANULOCYTES # BLD: 0 10E3/UL
IMM GRANULOCYTES # BLD: 0.1 10E3/UL
IMM GRANULOCYTES NFR BLD: 0 %
IMM GRANULOCYTES NFR BLD: 1 %
INR PPP: 1.22 (ref 0.85–1.15)
INR PPP: 1.26 (ref 0.85–1.15)
INR PPP: 1.29 (ref 0.85–1.15)
INR PPP: 1.33 (ref 0.85–1.15)
INR PPP: 1.41 (ref 0.85–1.15)
INR PPP: 1.42 (ref 0.85–1.15)
INR PPP: 1.44 (ref 0.85–1.15)
INR PPP: 1.45 (ref 0.85–1.15)
INR PPP: 1.46 (ref 0.85–1.15)
INTERPRETATION ECG - MUSE: NORMAL
IRON SATN MFR SERPL: 3 % (ref 15–46)
IRON SATN MFR SERPL: 7 % (ref 15–46)
IRON SERPL-MCNC: 15 UG/DL (ref 35–180)
IRON SERPL-MCNC: 28 UG/DL (ref 35–180)
KETONES UR STRIP-MCNC: NEGATIVE MG/DL
LA PPP-IMP: NEGATIVE
LACTATE BLD-SCNC: 3.1 MMOL/L
LACTATE SERPL-SCNC: 1 MMOL/L (ref 0.7–2)
LACTATE SERPL-SCNC: 1.1 MMOL/L (ref 0.7–2)
LACTATE SERPL-SCNC: 1.2 MMOL/L (ref 0.7–2)
LACTATE SERPL-SCNC: 1.3 MMOL/L (ref 0.7–2)
LACTATE SERPL-SCNC: 1.4 MMOL/L (ref 0.7–2)
LACTATE SERPL-SCNC: 1.5 MMOL/L (ref 0.7–2)
LACTATE SERPL-SCNC: 1.6 MMOL/L (ref 0.7–2)
LACTATE SERPL-SCNC: 2.1 MMOL/L (ref 0.7–2)
LACTATE SERPL-SCNC: 3.8 MMOL/L (ref 0.7–2)
LDLC SERPL CALC-MCNC: 46 MG/DL
LDLC SERPL CALC-MCNC: 52 MG/DL
LDLC SERPL CALC-MCNC: 53 MG/DL
LEUKOCYTE ESTERASE UR QL STRIP: NEGATIVE
LIPASE SERPL-CCNC: 106 U/L (ref 73–393)
LIPASE SERPL-CCNC: 94 U/L (ref 73–393)
LUPUS INTERPRETATION: NORMAL
LVEF ECHO: NORMAL
LVEF ECHO: NORMAL
LYMPHOCYTES # BLD AUTO: 1.4 10E3/UL (ref 0.8–5.3)
LYMPHOCYTES # BLD AUTO: 2.3 10E3/UL (ref 0.8–5.3)
LYMPHOCYTES NFR BLD AUTO: 14 %
LYMPHOCYTES NFR BLD AUTO: 27 %
MAGNESIUM SERPL-MCNC: 1.9 MG/DL (ref 1.6–2.3)
MAGNESIUM SERPL-MCNC: 2 MG/DL (ref 1.6–2.3)
MAGNESIUM SERPL-MCNC: 2.1 MG/DL (ref 1.6–2.3)
MAGNESIUM SERPL-MCNC: 2.2 MG/DL (ref 1.6–2.3)
MAGNESIUM SERPL-MCNC: 2.3 MG/DL (ref 1.6–2.3)
MAGNESIUM SERPL-MCNC: 2.5 MG/DL (ref 1.6–2.3)
MCH RBC QN AUTO: 22.5 PG (ref 26.5–33)
MCH RBC QN AUTO: 22.6 PG (ref 26.5–33)
MCH RBC QN AUTO: 22.7 PG (ref 26.5–33)
MCH RBC QN AUTO: 22.7 PG (ref 26.5–33)
MCH RBC QN AUTO: 22.8 PG (ref 26.5–33)
MCH RBC QN AUTO: 22.9 PG (ref 26.5–33)
MCH RBC QN AUTO: 23.5 PG (ref 26.5–33)
MCH RBC QN AUTO: 23.5 PG (ref 26.5–33)
MCH RBC QN AUTO: 23.6 PG (ref 26.5–33)
MCH RBC QN AUTO: 23.7 PG (ref 26.5–33)
MCH RBC QN AUTO: 23.8 PG (ref 26.5–33)
MCH RBC QN AUTO: 23.8 PG (ref 26.5–33)
MCH RBC QN AUTO: 23.9 PG (ref 26.5–33)
MCH RBC QN AUTO: 24 PG (ref 26.5–33)
MCHC RBC AUTO-ENTMCNC: 29 G/DL (ref 31.5–36.5)
MCHC RBC AUTO-ENTMCNC: 29.4 G/DL (ref 31.5–36.5)
MCHC RBC AUTO-ENTMCNC: 29.4 G/DL (ref 31.5–36.5)
MCHC RBC AUTO-ENTMCNC: 29.8 G/DL (ref 31.5–36.5)
MCHC RBC AUTO-ENTMCNC: 29.8 G/DL (ref 31.5–36.5)
MCHC RBC AUTO-ENTMCNC: 30 G/DL (ref 31.5–36.5)
MCHC RBC AUTO-ENTMCNC: 30 G/DL (ref 31.5–36.5)
MCHC RBC AUTO-ENTMCNC: 30.1 G/DL (ref 31.5–36.5)
MCHC RBC AUTO-ENTMCNC: 30.2 G/DL (ref 31.5–36.5)
MCHC RBC AUTO-ENTMCNC: 30.4 G/DL (ref 31.5–36.5)
MCHC RBC AUTO-ENTMCNC: 30.5 G/DL (ref 31.5–36.5)
MCHC RBC AUTO-ENTMCNC: 30.6 G/DL (ref 31.5–36.5)
MCHC RBC AUTO-ENTMCNC: 30.6 G/DL (ref 31.5–36.5)
MCHC RBC AUTO-ENTMCNC: 30.7 G/DL (ref 31.5–36.5)
MCHC RBC AUTO-ENTMCNC: 30.8 G/DL (ref 31.5–36.5)
MCHC RBC AUTO-ENTMCNC: 30.8 G/DL (ref 31.5–36.5)
MCHC RBC AUTO-ENTMCNC: 31 G/DL (ref 31.5–36.5)
MCHC RBC AUTO-ENTMCNC: 31.4 G/DL (ref 31.5–36.5)
MCHC RBC AUTO-ENTMCNC: 31.6 G/DL (ref 31.5–36.5)
MCHC RBC AUTO-ENTMCNC: 31.6 G/DL (ref 31.5–36.5)
MCV RBC AUTO: 74 FL (ref 78–100)
MCV RBC AUTO: 75 FL (ref 78–100)
MCV RBC AUTO: 76 FL (ref 78–100)
MCV RBC AUTO: 77 FL (ref 78–100)
MCV RBC AUTO: 78 FL (ref 78–100)
MCV RBC AUTO: 79 FL (ref 78–100)
MONOCYTES # BLD AUTO: 0.7 10E3/UL (ref 0–1.3)
MONOCYTES # BLD AUTO: 1.2 10E3/UL (ref 0–1.3)
MONOCYTES NFR BLD AUTO: 12 %
MONOCYTES NFR BLD AUTO: 8 %
MUCOUS THREADS #/AREA URNS LPF: PRESENT /LPF
NEUTROPHILS # BLD AUTO: 5 10E3/UL (ref 1.6–8.3)
NEUTROPHILS # BLD AUTO: 7.4 10E3/UL (ref 1.6–8.3)
NEUTROPHILS NFR BLD AUTO: 59 %
NEUTROPHILS NFR BLD AUTO: 71 %
NICOTINE SERPL-MCNC: <1 NG/ML
NITRATE UR QL: NEGATIVE
NONHDLC SERPL-MCNC: 58 MG/DL
NONHDLC SERPL-MCNC: 62 MG/DL
NONHDLC SERPL-MCNC: 65 MG/DL
NRBC # BLD AUTO: 0 10E3/UL
NRBC # BLD AUTO: 0 10E3/UL
NRBC BLD AUTO-RTO: 0 /100
NRBC BLD AUTO-RTO: 0 /100
NT-PROBNP SERPL-MCNC: 3752 PG/ML (ref 0–900)
NT-PROBNP SERPL-MCNC: 3891 PG/ML (ref 0–900)
NT-PROBNP SERPL-MCNC: 4432 PG/ML (ref 0–125)
NT-PROBNP SERPL-MCNC: 4529 PG/ML (ref 0–125)
NT-PROBNP SERPL-MCNC: 7641 PG/ML (ref 0–900)
NT-PROBNP SERPL-MCNC: ABNORMAL PG/ML (ref 0–125)
O2/TOTAL GAS SETTING VFR VENT: 0 %
O2/TOTAL GAS SETTING VFR VENT: 1 %
O2/TOTAL GAS SETTING VFR VENT: 100 %
O2/TOTAL GAS SETTING VFR VENT: 2 %
O2/TOTAL GAS SETTING VFR VENT: 21 %
O2/TOTAL GAS SETTING VFR VENT: 8 %
OPIATES UR QL SCN: NORMAL
OXYHGB MFR BLDA: 93 % (ref 92–100)
OXYHGB MFR BLDV: 11 % (ref 70–75)
OXYHGB MFR BLDV: 28 % (ref 70–75)
OXYHGB MFR BLDV: 31 % (ref 70–75)
OXYHGB MFR BLDV: 31 % (ref 70–75)
OXYHGB MFR BLDV: 33 % (ref 70–75)
OXYHGB MFR BLDV: 35 % (ref 70–75)
OXYHGB MFR BLDV: 37 % (ref 70–75)
OXYHGB MFR BLDV: 37 % (ref 92–100)
OXYHGB MFR BLDV: 39 % (ref 70–75)
OXYHGB MFR BLDV: 40 % (ref 70–75)
OXYHGB MFR BLDV: 41 % (ref 92–100)
OXYHGB MFR BLDV: 43 % (ref 70–75)
OXYHGB MFR BLDV: 43 % (ref 70–75)
OXYHGB MFR BLDV: 44 % (ref 70–75)
OXYHGB MFR BLDV: 45 % (ref 70–75)
OXYHGB MFR BLDV: 45 % (ref 70–75)
OXYHGB MFR BLDV: 47 % (ref 70–75)
OXYHGB MFR BLDV: 49 % (ref 70–75)
OXYHGB MFR BLDV: 49 % (ref 70–75)
OXYHGB MFR BLDV: 50 % (ref 70–75)
OXYHGB MFR BLDV: 50 % (ref 70–75)
OXYHGB MFR BLDV: 51 % (ref 70–75)
OXYHGB MFR BLDV: 52 % (ref 70–75)
OXYHGB MFR BLDV: 53 % (ref 70–75)
OXYHGB MFR BLDV: 54 % (ref 70–75)
OXYHGB MFR BLDV: 55 % (ref 70–75)
OXYHGB MFR BLDV: 55 % (ref 70–75)
OXYHGB MFR BLDV: 56 % (ref 70–75)
OXYHGB MFR BLDV: 57 % (ref 70–75)
OXYHGB MFR BLDV: 57 % (ref 70–75)
OXYHGB MFR BLDV: 58 % (ref 70–75)
OXYHGB MFR BLDV: 59 % (ref 70–75)
OXYHGB MFR BLDV: 59 % (ref 70–75)
OXYHGB MFR BLDV: 60 % (ref 70–75)
OXYHGB MFR BLDV: 60 % (ref 70–75)
OXYHGB MFR BLDV: 62 % (ref 70–75)
OXYHGB MFR BLDV: 63 % (ref 70–75)
OXYHGB MFR BLDV: 64 % (ref 70–75)
OXYHGB MFR BLDV: 64 % (ref 70–75)
OXYHGB MFR BLDV: 67 % (ref 70–75)
OXYHGB MFR BLDV: 70 % (ref 70–75)
OXYHGB MFR BLDV: 70 % (ref 70–75)
OXYHGB MFR BLDV: 72 % (ref 70–75)
OXYHGB MFR BLDV: 72 % (ref 70–75)
OXYHGB MFR BLDV: 74 % (ref 70–75)
OXYHGB MFR BLDV: 78 % (ref 70–75)
OXYHGB MFR BLDV: 80 % (ref 70–75)
OXYHGB MFR BLDV: 86 % (ref 70–75)
OXYHGB MFR BLDV: 87 % (ref 92–100)
OXYHGB MFR BLDV: 89 % (ref 70–75)
OXYHGB MFR BLDV: 9 % (ref 70–75)
OXYHGB MFR BLDV: 94 % (ref 70–75)
P AXIS - MUSE: 45 DEGREES
P AXIS - MUSE: 53 DEGREES
P AXIS - MUSE: 54 DEGREES
P AXIS - MUSE: 55 DEGREES
P AXIS - MUSE: 55 DEGREES
P AXIS - MUSE: 61 DEGREES
PATH REPORT.COMMENTS IMP SPEC: NORMAL
PATH REPORT.COMMENTS IMP SPEC: NORMAL
PATH REPORT.FINAL DX SPEC: NORMAL
PATH REPORT.GROSS SPEC: NORMAL
PATH REPORT.MICROSCOPIC SPEC OTHER STN: NORMAL
PATH REPORT.RELEVANT HX SPEC: NORMAL
PCO2 BLDV: 26 MM HG (ref 40–50)
PCO2 BLDV: 31 MM HG (ref 40–50)
PCO2 BLDV: 32 MM HG (ref 40–50)
PCO2 BLDV: 33 MM HG (ref 40–50)
PCO2 BLDV: 34 MM HG (ref 40–50)
PCO2 BLDV: 36 MM HG (ref 40–50)
PCO2 BLDV: 37 MM HG (ref 40–50)
PCO2 BLDV: 38 MM HG (ref 40–50)
PCO2 BLDV: 39 MM HG (ref 40–50)
PCO2 BLDV: 40 MM HG (ref 40–50)
PCO2 BLDV: 41 MM HG (ref 40–50)
PCO2 BLDV: 42 MM HG (ref 40–50)
PCO2 BLDV: 43 MM HG (ref 40–50)
PCO2 BLDV: 44 MM HG (ref 40–50)
PCO2 BLDV: 45 MM HG (ref 40–50)
PCO2 BLDV: 45 MM HG (ref 40–50)
PCO2 BLDV: 46 MM HG (ref 40–50)
PCO2 BLDV: 47 MM HG (ref 40–50)
PCO2 BLDV: 48 MM HG (ref 40–50)
PCO2 BLDV: 49 MM HG (ref 40–50)
PCO2 BLDV: 50 MM HG (ref 40–50)
PCP UR QL SCN: NORMAL
PCP UR QL SCN: NORMAL
PETH BLD-MCNC: NEGATIVE NG/ML
PETH BLD-MCNC: NEGATIVE NG/ML
PH BLDV: 7.36 [PH] (ref 7.32–7.43)
PH BLDV: 7.37 [PH] (ref 7.32–7.43)
PH BLDV: 7.38 [PH] (ref 7.32–7.43)
PH BLDV: 7.39 [PH] (ref 7.32–7.43)
PH BLDV: 7.4 [PH] (ref 7.32–7.43)
PH BLDV: 7.41 [PH] (ref 7.32–7.43)
PH BLDV: 7.42 [PH] (ref 7.32–7.43)
PH BLDV: 7.43 [PH] (ref 7.32–7.43)
PH BLDV: 7.44 [PH] (ref 7.32–7.43)
PH BLDV: 7.45 [PH] (ref 7.32–7.43)
PH BLDV: 7.46 [PH] (ref 7.32–7.43)
PH BLDV: 7.5 [PH] (ref 7.32–7.43)
PH UR STRIP: 5 [PH] (ref 5–7)
PHOSPHATE SERPL-MCNC: 4 MG/DL (ref 2.5–4.5)
PHOTO IMAGE: NORMAL
PLATELET # BLD AUTO: 196 10E3/UL (ref 150–450)
PLATELET # BLD AUTO: 210 10E3/UL (ref 150–450)
PLATELET # BLD AUTO: 214 10E3/UL (ref 150–450)
PLATELET # BLD AUTO: 215 10E3/UL (ref 150–450)
PLATELET # BLD AUTO: 219 10E3/UL (ref 150–450)
PLATELET # BLD AUTO: 230 10E3/UL (ref 150–450)
PLATELET # BLD AUTO: 236 10E3/UL (ref 150–450)
PLATELET # BLD AUTO: 245 10E3/UL (ref 150–450)
PLATELET # BLD AUTO: 248 10E3/UL (ref 150–450)
PLATELET # BLD AUTO: 256 10E3/UL (ref 150–450)
PLATELET # BLD AUTO: 259 10E3/UL (ref 150–450)
PLATELET # BLD AUTO: 276 10E3/UL (ref 150–450)
PLATELET # BLD AUTO: 298 10E3/UL (ref 150–450)
PLATELET # BLD AUTO: 305 10E3/UL (ref 150–450)
PLATELET # BLD AUTO: 308 10E3/UL (ref 150–450)
PLATELET # BLD AUTO: 315 10E3/UL (ref 150–450)
PLATELET # BLD AUTO: 321 10E3/UL (ref 150–450)
PLATELET # BLD AUTO: 321 10E3/UL (ref 150–450)
PLATELET # BLD AUTO: 340 10E3/UL (ref 150–450)
PLATELET # BLD AUTO: 342 10E3/UL (ref 150–450)
PLATELET # BLD AUTO: 344 10E3/UL (ref 150–450)
PLATELET # BLD AUTO: 385 10E3/UL (ref 150–450)
PO2 BLDV: 12 MM HG (ref 25–47)
PO2 BLDV: 19 MM HG (ref 25–47)
PO2 BLDV: 21 MM HG (ref 25–47)
PO2 BLDV: 21 MM HG (ref 25–47)
PO2 BLDV: 22 MM HG (ref 25–47)
PO2 BLDV: 24 MM HG (ref 25–47)
PO2 BLDV: 25 MM HG (ref 25–47)
PO2 BLDV: 26 MM HG (ref 25–47)
PO2 BLDV: 27 MM HG (ref 25–47)
PO2 BLDV: 28 MM HG (ref 25–47)
PO2 BLDV: 28 MM HG (ref 25–47)
PO2 BLDV: 29 MM HG (ref 25–47)
PO2 BLDV: 30 MM HG (ref 25–47)
PO2 BLDV: 31 MM HG (ref 25–47)
PO2 BLDV: 32 MM HG (ref 25–47)
PO2 BLDV: 32 MM HG (ref 25–47)
PO2 BLDV: 33 MM HG (ref 25–47)
PO2 BLDV: 33 MM HG (ref 25–47)
PO2 BLDV: 34 MM HG (ref 25–47)
PO2 BLDV: 35 MM HG (ref 25–47)
PO2 BLDV: 36 MM HG (ref 25–47)
PO2 BLDV: 37 MM HG (ref 25–47)
PO2 BLDV: 38 MM HG (ref 25–47)
PO2 BLDV: 38 MM HG (ref 25–47)
PO2 BLDV: 39 MM HG (ref 25–47)
PO2 BLDV: 40 MM HG (ref 25–47)
PO2 BLDV: 42 MM HG (ref 25–47)
PO2 BLDV: 42 MM HG (ref 25–47)
PO2 BLDV: 46 MM HG (ref 25–47)
PO2 BLDV: 54 MM HG (ref 25–47)
PO2 BLDV: 57 MM HG (ref 25–47)
PO2 BLDV: 61 MM HG (ref 25–47)
PO2 BLDV: 72 MM HG (ref 25–47)
POTASSIUM BLD-SCNC: 2.1 MMOL/L (ref 3.4–5.3)
POTASSIUM BLD-SCNC: 3.1 MMOL/L (ref 3.4–5.3)
POTASSIUM BLD-SCNC: 3.1 MMOL/L (ref 3.4–5.3)
POTASSIUM BLD-SCNC: 3.2 MMOL/L (ref 3.4–5.3)
POTASSIUM BLD-SCNC: 3.2 MMOL/L (ref 3.4–5.3)
POTASSIUM BLD-SCNC: 3.3 MMOL/L (ref 3.4–5.3)
POTASSIUM BLD-SCNC: 3.4 MMOL/L (ref 3.4–5.3)
POTASSIUM BLD-SCNC: 3.5 MMOL/L (ref 3.4–5.3)
POTASSIUM BLD-SCNC: 3.6 MMOL/L (ref 3.4–5.3)
POTASSIUM BLD-SCNC: 3.7 MMOL/L (ref 3.4–5.3)
POTASSIUM BLD-SCNC: 3.8 MMOL/L (ref 3.4–5.3)
POTASSIUM BLD-SCNC: 3.9 MMOL/L (ref 3.4–5.3)
POTASSIUM BLD-SCNC: 3.9 MMOL/L (ref 3.4–5.3)
POTASSIUM BLD-SCNC: 4 MMOL/L (ref 3.4–5.3)
POTASSIUM BLD-SCNC: 4.1 MMOL/L (ref 3.4–5.3)
POTASSIUM BLD-SCNC: 4.1 MMOL/L (ref 3.4–5.3)
POTASSIUM BLD-SCNC: 4.2 MMOL/L (ref 3.4–5.3)
POTASSIUM BLD-SCNC: 4.2 MMOL/L (ref 3.4–5.3)
POTASSIUM BLD-SCNC: 4.3 MMOL/L (ref 3.4–5.3)
POTASSIUM BLD-SCNC: 4.3 MMOL/L (ref 3.4–5.3)
POTASSIUM BLD-SCNC: 4.4 MMOL/L (ref 3.4–5.3)
POTASSIUM BLD-SCNC: 4.5 MMOL/L (ref 3.4–5.3)
POTASSIUM BLD-SCNC: 4.6 MMOL/L (ref 3.4–5.3)
POTASSIUM BLD-SCNC: 4.7 MMOL/L (ref 3.4–5.3)
PR INTERVAL - MUSE: 154 MS
PR INTERVAL - MUSE: 154 MS
PR INTERVAL - MUSE: 178 MS
PR INTERVAL - MUSE: 184 MS
PR INTERVAL - MUSE: 184 MS
PR INTERVAL - MUSE: 194 MS
PREALB SERPL IA-MCNC: 12 MG/DL (ref 15–45)
PROT SERPL-MCNC: 5.5 G/DL (ref 6.8–8.8)
PROT SERPL-MCNC: 5.8 G/DL (ref 6.8–8.8)
PROT SERPL-MCNC: 5.9 G/DL (ref 6.8–8.8)
PROT SERPL-MCNC: 6 G/DL (ref 6.8–8.8)
PROT SERPL-MCNC: 6.1 G/DL (ref 6.8–8.8)
PROT SERPL-MCNC: 6.1 G/DL (ref 6.8–8.8)
PROT SERPL-MCNC: 6.2 G/DL (ref 6.8–8.8)
PROT SERPL-MCNC: 6.3 G/DL (ref 6.8–8.8)
PROT SERPL-MCNC: 6.3 G/DL (ref 6.8–8.8)
PROT SERPL-MCNC: 6.4 G/DL (ref 6.8–8.8)
PROT SERPL-MCNC: 6.4 G/DL (ref 6.8–8.8)
PROT SERPL-MCNC: 6.5 G/DL (ref 6.8–8.8)
PROT SERPL-MCNC: 6.6 G/DL (ref 6.8–8.8)
PROT SERPL-MCNC: 6.7 G/DL (ref 6.8–8.8)
PROT SERPL-MCNC: 6.8 G/DL (ref 6.8–8.8)
PROT SERPL-MCNC: 6.9 G/DL (ref 6.8–8.8)
PROT SERPL-MCNC: 7 G/DL (ref 6.8–8.8)
PROT SERPL-MCNC: 7 G/DL (ref 6.8–8.8)
PROT SERPL-MCNC: 7.7 G/DL (ref 6.8–8.8)
PSA FREE MFR SERPL: 18 %
PSA FREE SERPL-MCNC: 0.2 NG/ML
PSA SERPL IA-MCNC: 1.1 NG/ML
PTT RATIO: 1.16
QRS DURATION - MUSE: 140 MS
QRS DURATION - MUSE: 144 MS
QRS DURATION - MUSE: 146 MS
QT - MUSE: 394 MS
QT - MUSE: 410 MS
QT - MUSE: 416 MS
QT - MUSE: 420 MS
QT - MUSE: 426 MS
QT - MUSE: 462 MS
QTC - MUSE: 511 MS
QTC - MUSE: 525 MS
QTC - MUSE: 530 MS
QTC - MUSE: 534 MS
QTC - MUSE: 552 MS
QTC - MUSE: 592 MS
R AXIS - MUSE: -34 DEGREES
R AXIS - MUSE: -35 DEGREES
R AXIS - MUSE: -36 DEGREES
R AXIS - MUSE: -42 DEGREES
R AXIS - MUSE: -43 DEGREES
R AXIS - MUSE: -48 DEGREES
RBC # BLD AUTO: 4.04 10E6/UL (ref 4.4–5.9)
RBC # BLD AUTO: 4.06 10E6/UL (ref 4.4–5.9)
RBC # BLD AUTO: 4.13 10E6/UL (ref 4.4–5.9)
RBC # BLD AUTO: 4.22 10E6/UL (ref 4.4–5.9)
RBC # BLD AUTO: 4.37 10E6/UL (ref 4.4–5.9)
RBC # BLD AUTO: 4.4 10E6/UL (ref 4.4–5.9)
RBC # BLD AUTO: 4.42 10E6/UL (ref 4.4–5.9)
RBC # BLD AUTO: 4.42 10E6/UL (ref 4.4–5.9)
RBC # BLD AUTO: 4.58 10E6/UL (ref 4.4–5.9)
RBC # BLD AUTO: 4.62 10E6/UL (ref 4.4–5.9)
RBC # BLD AUTO: 4.73 10E6/UL (ref 4.4–5.9)
RBC # BLD AUTO: 4.74 10E6/UL (ref 4.4–5.9)
RBC # BLD AUTO: 4.75 10E6/UL (ref 4.4–5.9)
RBC # BLD AUTO: 4.76 10E6/UL (ref 4.4–5.9)
RBC # BLD AUTO: 4.8 10E6/UL (ref 4.4–5.9)
RBC # BLD AUTO: 4.85 10E6/UL (ref 4.4–5.9)
RBC # BLD AUTO: 4.9 10E6/UL (ref 4.4–5.9)
RBC # BLD AUTO: 4.94 10E6/UL (ref 4.4–5.9)
RBC # BLD AUTO: 4.99 10E6/UL (ref 4.4–5.9)
RBC # BLD AUTO: 5.01 10E6/UL (ref 4.4–5.9)
RBC # BLD AUTO: 5.05 10E6/UL (ref 4.4–5.9)
RBC # BLD AUTO: 5.15 10E6/UL (ref 4.4–5.9)
RBC URINE: <1 /HPF
RSV RNA SPEC NAA+PROBE: NEGATIVE
SAO2 % BLDV: 71 % (ref 94–100)
SAO2 % BLDV: 74 % (ref 94–100)
SARS-COV-2 RNA RESP QL NAA+PROBE: NEGATIVE
SODIUM BLD-SCNC: 126 MMOL/L (ref 133–144)
SODIUM SERPL-SCNC: 125 MMOL/L (ref 133–144)
SODIUM SERPL-SCNC: 132 MMOL/L (ref 133–144)
SODIUM SERPL-SCNC: 134 MMOL/L (ref 133–144)
SODIUM SERPL-SCNC: 135 MMOL/L (ref 133–144)
SODIUM SERPL-SCNC: 136 MMOL/L (ref 133–144)
SODIUM SERPL-SCNC: 137 MMOL/L (ref 133–144)
SODIUM SERPL-SCNC: 138 MMOL/L (ref 133–144)
SODIUM SERPL-SCNC: 139 MMOL/L (ref 133–144)
SP GR UR STRIP: 1.01 (ref 1–1.03)
SPECIMEN EXPIRATION DATE: NORMAL
SYSTOLIC BLOOD PRESSURE - MUSE: NORMAL MMHG
T AXIS - MUSE: 100 DEGREES
T AXIS - MUSE: 104 DEGREES
T AXIS - MUSE: 104 DEGREES
T AXIS - MUSE: 114 DEGREES
T AXIS - MUSE: 88 DEGREES
T AXIS - MUSE: 97 DEGREES
T4 FREE SERPL-MCNC: 1 NG/DL (ref 0.76–1.46)
T4 FREE SERPL-MCNC: 1.1 NG/DL (ref 0.76–1.46)
THROMBIN TIME: 15.5 SECONDS (ref 13–19)
TIBC SERPL-MCNC: 398 UG/DL (ref 240–430)
TIBC SERPL-MCNC: 502 UG/DL (ref 240–430)
TRANSFERRIN SERPL-MCNC: 311 MG/DL (ref 210–360)
TRIGL SERPL-MCNC: 50 MG/DL
TRIGL SERPL-MCNC: 58 MG/DL
TRIGL SERPL-MCNC: 60 MG/DL
TROPONIN I SERPL HS-MCNC: 32 NG/L
TROPONIN I SERPL-MCNC: 0.03 UG/L (ref 0–0.04)
TROPONIN I SERPL-MCNC: 0.04 UG/L (ref 0–0.04)
TROPONIN T BLD-MCNC: 0.02 UG/L
TSH SERPL DL<=0.005 MIU/L-ACNC: 6.5 MU/L (ref 0.4–4)
TSH SERPL DL<=0.005 MIU/L-ACNC: 6.54 MU/L (ref 0.4–4)
URATE SERPL-MCNC: 5.5 MG/DL (ref 3.5–7.2)
URATE SERPL-MCNC: 5.7 MG/DL (ref 3.5–7.2)
URATE SERPL-MCNC: 5.8 MG/DL (ref 3.5–7.2)
UROBILINOGEN UR STRIP-MCNC: NORMAL MG/DL
VENTRICULAR RATE- MUSE: 102 BPM
VENTRICULAR RATE- MUSE: 109 BPM
VENTRICULAR RATE- MUSE: 116 BPM
VENTRICULAR RATE- MUSE: 86 BPM
VENTRICULAR RATE- MUSE: 91 BPM
VENTRICULAR RATE- MUSE: 94 BPM
WBC # BLD AUTO: 10.2 10E3/UL (ref 4–11)
WBC # BLD AUTO: 4.7 10E3/UL (ref 4–11)
WBC # BLD AUTO: 5.1 10E3/UL (ref 4–11)
WBC # BLD AUTO: 6 10E3/UL (ref 4–11)
WBC # BLD AUTO: 6.2 10E3/UL (ref 4–11)
WBC # BLD AUTO: 6.3 10E3/UL (ref 4–11)
WBC # BLD AUTO: 6.4 10E3/UL (ref 4–11)
WBC # BLD AUTO: 6.7 10E3/UL (ref 4–11)
WBC # BLD AUTO: 6.9 10E3/UL (ref 4–11)
WBC # BLD AUTO: 7 10E3/UL (ref 4–11)
WBC # BLD AUTO: 7.7 10E3/UL (ref 4–11)
WBC # BLD AUTO: 7.7 10E3/UL (ref 4–11)
WBC # BLD AUTO: 8.2 10E3/UL (ref 4–11)
WBC # BLD AUTO: 8.3 10E3/UL (ref 4–11)
WBC # BLD AUTO: 8.3 10E3/UL (ref 4–11)
WBC # BLD AUTO: 8.6 10E3/UL (ref 4–11)
WBC # BLD AUTO: 9.4 10E3/UL (ref 4–11)
WBC # BLD AUTO: 9.7 10E3/UL (ref 4–11)
WBC URINE: 1 /HPF

## 2021-01-01 PROCEDURE — 99233 SBSQ HOSP IP/OBS HIGH 50: CPT | Performed by: CLINICAL NURSE SPECIALIST

## 2021-01-01 PROCEDURE — 84484 ASSAY OF TROPONIN QUANT: CPT | Performed by: NURSE PRACTITIONER

## 2021-01-01 PROCEDURE — 999N000155 HC STATISTIC RAPCV CVP MONITORING

## 2021-01-01 PROCEDURE — 83605 ASSAY OF LACTIC ACID: CPT | Performed by: EMERGENCY MEDICINE

## 2021-01-01 PROCEDURE — 82040 ASSAY OF SERUM ALBUMIN: CPT | Performed by: NURSE PRACTITIONER

## 2021-01-01 PROCEDURE — 250N000013 HC RX MED GY IP 250 OP 250 PS 637: Performed by: STUDENT IN AN ORGANIZED HEALTH CARE EDUCATION/TRAINING PROGRAM

## 2021-01-01 PROCEDURE — 83036 HEMOGLOBIN GLYCOSYLATED A1C: CPT | Mod: GA | Performed by: PATHOLOGY

## 2021-01-01 PROCEDURE — 36415 COLL VENOUS BLD VENIPUNCTURE: CPT | Performed by: NURSE PRACTITIONER

## 2021-01-01 PROCEDURE — 93325 DOPPLER ECHO COLOR FLOW MAPG: CPT | Mod: 26 | Performed by: INTERNAL MEDICINE

## 2021-01-01 PROCEDURE — 250N000013 HC RX MED GY IP 250 OP 250 PS 637: Performed by: INTERNAL MEDICINE

## 2021-01-01 PROCEDURE — 250N000013 HC RX MED GY IP 250 OP 250 PS 637

## 2021-01-01 PROCEDURE — 82805 BLOOD GASES W/O2 SATURATION: CPT | Performed by: STUDENT IN AN ORGANIZED HEALTH CARE EDUCATION/TRAINING PROGRAM

## 2021-01-01 PROCEDURE — 93880 EXTRACRANIAL BILAT STUDY: CPT

## 2021-01-01 PROCEDURE — 84155 ASSAY OF PROTEIN SERUM: CPT | Performed by: NURSE PRACTITIONER

## 2021-01-01 PROCEDURE — 83690 ASSAY OF LIPASE: CPT | Performed by: EMERGENCY MEDICINE

## 2021-01-01 PROCEDURE — 999N000045 HC STATISTIC DAILY SWAN MONITORING

## 2021-01-01 PROCEDURE — 99221 1ST HOSP IP/OBS SF/LOW 40: CPT | Performed by: NURSE PRACTITIONER

## 2021-01-01 PROCEDURE — 250N000011 HC RX IP 250 OP 636

## 2021-01-01 PROCEDURE — 272N000001 HC OR GENERAL SUPPLY STERILE: Performed by: INTERNAL MEDICINE

## 2021-01-01 PROCEDURE — 83735 ASSAY OF MAGNESIUM: CPT | Performed by: STUDENT IN AN ORGANIZED HEALTH CARE EDUCATION/TRAINING PROGRAM

## 2021-01-01 PROCEDURE — 93306 TTE W/DOPPLER COMPLETE: CPT | Mod: 26 | Performed by: INTERNAL MEDICINE

## 2021-01-01 PROCEDURE — 99232 SBSQ HOSP IP/OBS MODERATE 35: CPT | Performed by: NURSE PRACTITIONER

## 2021-01-01 PROCEDURE — 999N000065 XR CHEST PORT 1 VIEW

## 2021-01-01 PROCEDURE — 99239 HOSP IP/OBS DSCHRG MGMT >30: CPT | Performed by: INTERNAL MEDICINE

## 2021-01-01 PROCEDURE — G1004 CDSM NDSC: HCPCS | Mod: GC | Performed by: RADIOLOGY

## 2021-01-01 PROCEDURE — 999N000015 HC STATISTIC ARTERIAL MONITORING DAILY

## 2021-01-01 PROCEDURE — 71045 X-RAY EXAM CHEST 1 VIEW: CPT | Mod: 26 | Performed by: RADIOLOGY

## 2021-01-01 PROCEDURE — 250N000009 HC RX 250: Performed by: STUDENT IN AN ORGANIZED HEALTH CARE EDUCATION/TRAINING PROGRAM

## 2021-01-01 PROCEDURE — 70486 CT MAXILLOFACIAL W/O DYE: CPT | Mod: MG

## 2021-01-01 PROCEDURE — 83735 ASSAY OF MAGNESIUM: CPT | Performed by: NURSE PRACTITIONER

## 2021-01-01 PROCEDURE — 85027 COMPLETE CBC AUTOMATED: CPT | Performed by: NURSE PRACTITIONER

## 2021-01-01 PROCEDURE — 96365 THER/PROPH/DIAG IV INF INIT: CPT | Performed by: STUDENT IN AN ORGANIZED HEALTH CARE EDUCATION/TRAINING PROGRAM

## 2021-01-01 PROCEDURE — 4A133B3 MONITORING OF ARTERIAL PRESSURE, PULMONARY, PERCUTANEOUS APPROACH: ICD-10-PCS | Performed by: INTERNAL MEDICINE

## 2021-01-01 PROCEDURE — 82150 ASSAY OF AMYLASE: CPT | Performed by: NURSE PRACTITIONER

## 2021-01-01 PROCEDURE — 80053 COMPREHEN METABOLIC PANEL: CPT

## 2021-01-01 PROCEDURE — 82040 ASSAY OF SERUM ALBUMIN: CPT | Performed by: EMERGENCY MEDICINE

## 2021-01-01 PROCEDURE — 83605 ASSAY OF LACTIC ACID: CPT | Performed by: STUDENT IN AN ORGANIZED HEALTH CARE EDUCATION/TRAINING PROGRAM

## 2021-01-01 PROCEDURE — 93005 ELECTROCARDIOGRAM TRACING: CPT | Performed by: STUDENT IN AN ORGANIZED HEALTH CARE EDUCATION/TRAINING PROGRAM

## 2021-01-01 PROCEDURE — 36415 COLL VENOUS BLD VENIPUNCTURE: CPT | Performed by: PATHOLOGY

## 2021-01-01 PROCEDURE — 97116 GAIT TRAINING THERAPY: CPT | Mod: GP

## 2021-01-01 PROCEDURE — 250N000013 HC RX MED GY IP 250 OP 250 PS 637: Performed by: NURSE PRACTITIONER

## 2021-01-01 PROCEDURE — 99232 SBSQ HOSP IP/OBS MODERATE 35: CPT | Performed by: PHYSICIAN ASSISTANT

## 2021-01-01 PROCEDURE — 250N000011 HC RX IP 250 OP 636: Performed by: NURSE PRACTITIONER

## 2021-01-01 PROCEDURE — 200N000002 HC R&B ICU UMMC

## 2021-01-01 PROCEDURE — 82805 BLOOD GASES W/O2 SATURATION: CPT | Performed by: PHYSICIAN ASSISTANT

## 2021-01-01 PROCEDURE — 96132 NRPSYC TST EVAL PHYS/QHP 1ST: CPT | Performed by: CLINICAL NEUROPSYCHOLOGIST

## 2021-01-01 PROCEDURE — 258N000003 HC RX IP 258 OP 636

## 2021-01-01 PROCEDURE — 83550 IRON BINDING TEST: CPT | Performed by: NURSE PRACTITIONER

## 2021-01-01 PROCEDURE — 85018 HEMOGLOBIN: CPT

## 2021-01-01 PROCEDURE — 250N000011 HC RX IP 250 OP 636: Performed by: STUDENT IN AN ORGANIZED HEALTH CARE EDUCATION/TRAINING PROGRAM

## 2021-01-01 PROCEDURE — 82805 BLOOD GASES W/O2 SATURATION: CPT | Performed by: NURSE PRACTITIONER

## 2021-01-01 PROCEDURE — 93925 LOWER EXTREMITY STUDY: CPT | Mod: 26 | Performed by: RADIOLOGY

## 2021-01-01 PROCEDURE — 82805 BLOOD GASES W/O2 SATURATION: CPT

## 2021-01-01 PROCEDURE — 80053 COMPREHEN METABOLIC PANEL: CPT | Performed by: PATHOLOGY

## 2021-01-01 PROCEDURE — 250N000009 HC RX 250: Performed by: INTERNAL MEDICINE

## 2021-01-01 PROCEDURE — 96138 PSYCL/NRPSYC TECH 1ST: CPT | Performed by: CLINICAL NEUROPSYCHOLOGIST

## 2021-01-01 PROCEDURE — 999N000044 HC STATISTIC CVC DRESSING CHANGE

## 2021-01-01 PROCEDURE — 99291 CRITICAL CARE FIRST HOUR: CPT | Mod: 25 | Performed by: STUDENT IN AN ORGANIZED HEALTH CARE EDUCATION/TRAINING PROGRAM

## 2021-01-01 PROCEDURE — 80053 COMPREHEN METABOLIC PANEL: CPT | Performed by: STUDENT IN AN ORGANIZED HEALTH CARE EDUCATION/TRAINING PROGRAM

## 2021-01-01 PROCEDURE — 73100 X-RAY EXAM OF WRIST: CPT | Mod: 26 | Performed by: RADIOLOGY

## 2021-01-01 PROCEDURE — 87637 SARSCOV2&INF A&B&RSV AMP PRB: CPT | Performed by: EMERGENCY MEDICINE

## 2021-01-01 PROCEDURE — 80061 LIPID PANEL: CPT | Performed by: STUDENT IN AN ORGANIZED HEALTH CARE EDUCATION/TRAINING PROGRAM

## 2021-01-01 PROCEDURE — 36415 COLL VENOUS BLD VENIPUNCTURE: CPT | Performed by: PHYSICIAN ASSISTANT

## 2021-01-01 PROCEDURE — 999N000208 ECHOCARDIOGRAM LIMITED

## 2021-01-01 PROCEDURE — 84484 ASSAY OF TROPONIN QUANT: CPT | Performed by: EMERGENCY MEDICINE

## 2021-01-01 PROCEDURE — 84155 ASSAY OF PROTEIN SERUM: CPT | Performed by: STUDENT IN AN ORGANIZED HEALTH CARE EDUCATION/TRAINING PROGRAM

## 2021-01-01 PROCEDURE — 93880 EXTRACRANIAL BILAT STUDY: CPT | Mod: 26 | Performed by: RADIOLOGY

## 2021-01-01 PROCEDURE — 84450 TRANSFERASE (AST) (SGOT): CPT | Performed by: STUDENT IN AN ORGANIZED HEALTH CARE EDUCATION/TRAINING PROGRAM

## 2021-01-01 PROCEDURE — 84550 ASSAY OF BLOOD/URIC ACID: CPT | Performed by: INTERNAL MEDICINE

## 2021-01-01 PROCEDURE — 80321 ALCOHOLS BIOMARKERS 1OR 2: CPT

## 2021-01-01 PROCEDURE — 80053 COMPREHEN METABOLIC PANEL: CPT | Performed by: NURSE PRACTITIONER

## 2021-01-01 PROCEDURE — 999N000147 HC STATISTIC PT IP EVAL DEFER

## 2021-01-01 PROCEDURE — 93451 RIGHT HEART CATH: CPT | Performed by: INTERNAL MEDICINE

## 2021-01-01 PROCEDURE — 999N000208 ECHOCARDIOGRAM COMPLETE

## 2021-01-01 PROCEDURE — 80061 LIPID PANEL: CPT | Performed by: PATHOLOGY

## 2021-01-01 PROCEDURE — 96366 THER/PROPH/DIAG IV INF ADDON: CPT | Performed by: STUDENT IN AN ORGANIZED HEALTH CARE EDUCATION/TRAINING PROGRAM

## 2021-01-01 PROCEDURE — 93321 DOPPLER ECHO F-UP/LMTD STD: CPT | Mod: 26 | Performed by: INTERNAL MEDICINE

## 2021-01-01 PROCEDURE — 83880 ASSAY OF NATRIURETIC PEPTIDE: CPT | Performed by: PATHOLOGY

## 2021-01-01 PROCEDURE — 250N000013 HC RX MED GY IP 250 OP 250 PS 637: Performed by: PHYSICIAN ASSISTANT

## 2021-01-01 PROCEDURE — 999N000157 HC STATISTIC RCP TIME EA 10 MIN

## 2021-01-01 PROCEDURE — 214N000001 HC R&B CCU UMMC

## 2021-01-01 PROCEDURE — 96133 NRPSYC TST EVAL PHYS/QHP EA: CPT | Performed by: CLINICAL NEUROPSYCHOLOGIST

## 2021-01-01 PROCEDURE — 93005 ELECTROCARDIOGRAM TRACING: CPT

## 2021-01-01 PROCEDURE — 82805 BLOOD GASES W/O2 SATURATION: CPT | Performed by: INTERNAL MEDICINE

## 2021-01-01 PROCEDURE — 99233 SBSQ HOSP IP/OBS HIGH 50: CPT | Mod: GC | Performed by: INTERNAL MEDICINE

## 2021-01-01 PROCEDURE — 99223 1ST HOSP IP/OBS HIGH 75: CPT | Performed by: INTERNAL MEDICINE

## 2021-01-01 PROCEDURE — 84132 ASSAY OF SERUM POTASSIUM: CPT | Performed by: STUDENT IN AN ORGANIZED HEALTH CARE EDUCATION/TRAINING PROGRAM

## 2021-01-01 PROCEDURE — 96375 TX/PRO/DX INJ NEW DRUG ADDON: CPT | Performed by: STUDENT IN AN ORGANIZED HEALTH CARE EDUCATION/TRAINING PROGRAM

## 2021-01-01 PROCEDURE — 85610 PROTHROMBIN TIME: CPT | Performed by: STUDENT IN AN ORGANIZED HEALTH CARE EDUCATION/TRAINING PROGRAM

## 2021-01-01 PROCEDURE — 250N000011 HC RX IP 250 OP 636: Performed by: EMERGENCY MEDICINE

## 2021-01-01 PROCEDURE — 97530 THERAPEUTIC ACTIVITIES: CPT | Mod: GP

## 2021-01-01 PROCEDURE — 250N000009 HC RX 250

## 2021-01-01 PROCEDURE — 71045 X-RAY EXAM CHEST 1 VIEW: CPT

## 2021-01-01 PROCEDURE — 84443 ASSAY THYROID STIM HORMONE: CPT | Performed by: PATHOLOGY

## 2021-01-01 PROCEDURE — 80048 BASIC METABOLIC PNL TOTAL CA: CPT | Performed by: STUDENT IN AN ORGANIZED HEALTH CARE EDUCATION/TRAINING PROGRAM

## 2021-01-01 PROCEDURE — 02HQ32Z INSERTION OF MONITORING DEVICE INTO RIGHT PULMONARY ARTERY, PERCUTANEOUS APPROACH: ICD-10-PCS | Performed by: INTERNAL MEDICINE

## 2021-01-01 PROCEDURE — 80323 ALKALOIDS NOS: CPT | Mod: 90 | Performed by: PATHOLOGY

## 2021-01-01 PROCEDURE — 36569 INSJ PICC 5 YR+ W/O IMAGING: CPT

## 2021-01-01 PROCEDURE — 36415 COLL VENOUS BLD VENIPUNCTURE: CPT | Mod: 90 | Performed by: PATHOLOGY

## 2021-01-01 PROCEDURE — 36592 COLLECT BLOOD FROM PICC: CPT | Performed by: NURSE PRACTITIONER

## 2021-01-01 PROCEDURE — 82728 ASSAY OF FERRITIN: CPT | Performed by: NURSE PRACTITIONER

## 2021-01-01 PROCEDURE — C9113 INJ PANTOPRAZOLE SODIUM, VIA: HCPCS | Performed by: NURSE PRACTITIONER

## 2021-01-01 PROCEDURE — 83880 ASSAY OF NATRIURETIC PEPTIDE: CPT | Performed by: PHYSICIAN ASSISTANT

## 2021-01-01 PROCEDURE — 80321 ALCOHOLS BIOMARKERS 1OR 2: CPT | Mod: 90 | Performed by: PATHOLOGY

## 2021-01-01 PROCEDURE — 99285 EMERGENCY DEPT VISIT HI MDM: CPT | Mod: 25

## 2021-01-01 PROCEDURE — 80061 LIPID PANEL: CPT | Performed by: NURSE PRACTITIONER

## 2021-01-01 PROCEDURE — 258N000003 HC RX IP 258 OP 636: Performed by: STUDENT IN AN ORGANIZED HEALTH CARE EDUCATION/TRAINING PROGRAM

## 2021-01-01 PROCEDURE — 84132 ASSAY OF SERUM POTASSIUM: CPT

## 2021-01-01 PROCEDURE — 80053 COMPREHEN METABOLIC PANEL: CPT | Performed by: EMERGENCY MEDICINE

## 2021-01-01 PROCEDURE — 250N000011 HC RX IP 250 OP 636: Performed by: INTERNAL MEDICINE

## 2021-01-01 PROCEDURE — 76000 FLUOROSCOPY <1 HR PHYS/QHP: CPT | Mod: TC

## 2021-01-01 PROCEDURE — 85027 COMPLETE CBC AUTOMATED: CPT | Performed by: STUDENT IN AN ORGANIZED HEALTH CARE EDUCATION/TRAINING PROGRAM

## 2021-01-01 PROCEDURE — 86900 BLOOD TYPING SEROLOGIC ABO: CPT | Performed by: INTERNAL MEDICINE

## 2021-01-01 PROCEDURE — 90791 PSYCH DIAGNOSTIC EVALUATION: CPT | Performed by: CLINICAL NEUROPSYCHOLOGIST

## 2021-01-01 PROCEDURE — U0003 INFECTIOUS AGENT DETECTION BY NUCLEIC ACID (DNA OR RNA); SEVERE ACUTE RESPIRATORY SYNDROME CORONAVIRUS 2 (SARS-COV-2) (CORONAVIRUS DISEASE [COVID-19]), AMPLIFIED PROBE TECHNIQUE, MAKING USE OF HIGH THROUGHPUT TECHNOLOGIES AS DESCRIBED BY CMS-2020-01-R: HCPCS | Performed by: INTERNAL MEDICINE

## 2021-01-01 PROCEDURE — 86850 RBC ANTIBODY SCREEN: CPT | Performed by: NURSE PRACTITIONER

## 2021-01-01 PROCEDURE — 82248 BILIRUBIN DIRECT: CPT | Performed by: EMERGENCY MEDICINE

## 2021-01-01 PROCEDURE — 99239 HOSP IP/OBS DSCHRG MGMT >30: CPT | Mod: GC | Performed by: INTERNAL MEDICINE

## 2021-01-01 PROCEDURE — 82728 ASSAY OF FERRITIN: CPT | Performed by: STUDENT IN AN ORGANIZED HEALTH CARE EDUCATION/TRAINING PROGRAM

## 2021-01-01 PROCEDURE — 0W3P8ZZ CONTROL BLEEDING IN GASTROINTESTINAL TRACT, VIA NATURAL OR ARTIFICIAL OPENING ENDOSCOPIC: ICD-10-PCS | Performed by: INTERNAL MEDICINE

## 2021-01-01 PROCEDURE — 76700 US EXAM ABDOM COMPLETE: CPT | Mod: 26 | Performed by: RADIOLOGY

## 2021-01-01 PROCEDURE — 84550 ASSAY OF BLOOD/URIC ACID: CPT | Performed by: PATHOLOGY

## 2021-01-01 PROCEDURE — 80048 BASIC METABOLIC PNL TOTAL CA: CPT | Performed by: PATHOLOGY

## 2021-01-01 PROCEDURE — 36592 COLLECT BLOOD FROM PICC: CPT | Performed by: STUDENT IN AN ORGANIZED HEALTH CARE EDUCATION/TRAINING PROGRAM

## 2021-01-01 PROCEDURE — 99285 EMERGENCY DEPT VISIT HI MDM: CPT | Mod: 25 | Performed by: EMERGENCY MEDICINE

## 2021-01-01 PROCEDURE — 84132 ASSAY OF SERUM POTASSIUM: CPT | Performed by: NURSE PRACTITIONER

## 2021-01-01 PROCEDURE — 71250 CT THORAX DX C-: CPT | Mod: 26 | Performed by: RADIOLOGY

## 2021-01-01 PROCEDURE — 99222 1ST HOSP IP/OBS MODERATE 55: CPT | Mod: GC | Performed by: PSYCHIATRY & NEUROLOGY

## 2021-01-01 PROCEDURE — 99285 EMERGENCY DEPT VISIT HI MDM: CPT | Mod: 25 | Performed by: STUDENT IN AN ORGANIZED HEALTH CARE EDUCATION/TRAINING PROGRAM

## 2021-01-01 PROCEDURE — 93308 TTE F-UP OR LMTD: CPT | Mod: 26 | Performed by: INTERNAL MEDICINE

## 2021-01-01 PROCEDURE — 99291 CRITICAL CARE FIRST HOUR: CPT | Mod: 25 | Performed by: INTERNAL MEDICINE

## 2021-01-01 PROCEDURE — 80307 DRUG TEST PRSMV CHEM ANLYZR: CPT

## 2021-01-01 PROCEDURE — 97110 THERAPEUTIC EXERCISES: CPT | Mod: GO | Performed by: OCCUPATIONAL THERAPIST

## 2021-01-01 PROCEDURE — 80048 BASIC METABOLIC PNL TOTAL CA: CPT | Performed by: PHYSICIAN ASSISTANT

## 2021-01-01 PROCEDURE — 83880 ASSAY OF NATRIURETIC PEPTIDE: CPT | Performed by: EMERGENCY MEDICINE

## 2021-01-01 PROCEDURE — G0463 HOSPITAL OUTPT CLINIC VISIT: HCPCS

## 2021-01-01 PROCEDURE — 250N000009 HC RX 250: Performed by: NURSE PRACTITIONER

## 2021-01-01 PROCEDURE — 70450 CT HEAD/BRAIN W/O DYE: CPT | Mod: 26 | Performed by: STUDENT IN AN ORGANIZED HEALTH CARE EDUCATION/TRAINING PROGRAM

## 2021-01-01 PROCEDURE — 82803 BLOOD GASES ANY COMBINATION: CPT

## 2021-01-01 PROCEDURE — 80307 DRUG TEST PRSMV CHEM ANLYZR: CPT | Performed by: NURSE PRACTITIONER

## 2021-01-01 PROCEDURE — 272N000201 ZZ HC ADHESIVE SKIN CLOSURE, DERMABOND

## 2021-01-01 PROCEDURE — 73100 X-RAY EXAM OF WRIST: CPT | Mod: RT

## 2021-01-01 PROCEDURE — 84100 ASSAY OF PHOSPHORUS: CPT | Performed by: NURSE PRACTITIONER

## 2021-01-01 PROCEDURE — 71250 CT THORAX DX C-: CPT | Mod: MG

## 2021-01-01 PROCEDURE — 4A023N6 MEASUREMENT OF CARDIAC SAMPLING AND PRESSURE, RIGHT HEART, PERCUTANEOUS APPROACH: ICD-10-PCS | Performed by: INTERNAL MEDICINE

## 2021-01-01 PROCEDURE — 84439 ASSAY OF FREE THYROXINE: CPT | Performed by: PATHOLOGY

## 2021-01-01 PROCEDURE — 96374 THER/PROPH/DIAG INJ IV PUSH: CPT

## 2021-01-01 PROCEDURE — G0500 MOD SEDAT ENDO SERVICE >5YRS: HCPCS | Performed by: INTERNAL MEDICINE

## 2021-01-01 PROCEDURE — 99215 OFFICE O/P EST HI 40 MIN: CPT | Performed by: PHYSICIAN ASSISTANT

## 2021-01-01 PROCEDURE — 93010 ELECTROCARDIOGRAM REPORT: CPT | Performed by: STUDENT IN AN ORGANIZED HEALTH CARE EDUCATION/TRAINING PROGRAM

## 2021-01-01 PROCEDURE — 82040 ASSAY OF SERUM ALBUMIN: CPT | Performed by: STUDENT IN AN ORGANIZED HEALTH CARE EDUCATION/TRAINING PROGRAM

## 2021-01-01 PROCEDURE — 99232 SBSQ HOSP IP/OBS MODERATE 35: CPT | Mod: GC | Performed by: INTERNAL MEDICINE

## 2021-01-01 PROCEDURE — 82810 BLOOD GASES O2 SAT ONLY: CPT

## 2021-01-01 PROCEDURE — 82565 ASSAY OF CREATININE: CPT | Performed by: STUDENT IN AN ORGANIZED HEALTH CARE EDUCATION/TRAINING PROGRAM

## 2021-01-01 PROCEDURE — 85390 FIBRINOLYSINS SCREEN I&R: CPT | Mod: 26 | Performed by: PATHOLOGY

## 2021-01-01 PROCEDURE — 272N000012 HC KIT CATH SWAN VIP SUPPLY

## 2021-01-01 PROCEDURE — 82374 ASSAY BLOOD CARBON DIOXIDE: CPT | Performed by: STUDENT IN AN ORGANIZED HEALTH CARE EDUCATION/TRAINING PROGRAM

## 2021-01-01 PROCEDURE — 85025 COMPLETE CBC W/AUTO DIFF WBC: CPT | Performed by: EMERGENCY MEDICINE

## 2021-01-01 PROCEDURE — 45380 COLONOSCOPY AND BIOPSY: CPT | Performed by: INTERNAL MEDICINE

## 2021-01-01 PROCEDURE — G1004 CDSM NDSC: HCPCS | Mod: GC | Performed by: STUDENT IN AN ORGANIZED HEALTH CARE EDUCATION/TRAINING PROGRAM

## 2021-01-01 PROCEDURE — 36415 COLL VENOUS BLD VENIPUNCTURE: CPT | Performed by: INTERNAL MEDICINE

## 2021-01-01 PROCEDURE — 93503 INSERT/PLACE HEART CATHETER: CPT

## 2021-01-01 PROCEDURE — 93010 ELECTROCARDIOGRAM REPORT: CPT | Performed by: EMERGENCY MEDICINE

## 2021-01-01 PROCEDURE — 85730 THROMBOPLASTIN TIME PARTIAL: CPT | Performed by: NURSE PRACTITIONER

## 2021-01-01 PROCEDURE — 83605 ASSAY OF LACTIC ACID: CPT | Performed by: NURSE PRACTITIONER

## 2021-01-01 PROCEDURE — 70486 CT MAXILLOFACIAL W/O DYE: CPT | Mod: 26 | Performed by: STUDENT IN AN ORGANIZED HEALTH CARE EDUCATION/TRAINING PROGRAM

## 2021-01-01 PROCEDURE — 99207 PR CONSULT E&M CHANGED TO INITIAL LEVEL: CPT | Performed by: INTERNAL MEDICINE

## 2021-01-01 PROCEDURE — 96361 HYDRATE IV INFUSION ADD-ON: CPT

## 2021-01-01 PROCEDURE — 99233 SBSQ HOSP IP/OBS HIGH 50: CPT | Mod: 25 | Performed by: INTERNAL MEDICINE

## 2021-01-01 PROCEDURE — 97535 SELF CARE MNGMENT TRAINING: CPT | Mod: GO

## 2021-01-01 PROCEDURE — 84155 ASSAY OF PROTEIN SERUM: CPT

## 2021-01-01 PROCEDURE — 93010 ELECTROCARDIOGRAM REPORT: CPT | Performed by: INTERNAL MEDICINE

## 2021-01-01 PROCEDURE — 84134 ASSAY OF PREALBUMIN: CPT | Performed by: NURSE PRACTITIONER

## 2021-01-01 PROCEDURE — 99207 PR CONSULT E&M CHANGED TO INITIAL LEVEL: CPT | Performed by: PSYCHIATRY & NEUROLOGY

## 2021-01-01 PROCEDURE — 97161 PT EVAL LOW COMPLEX 20 MIN: CPT | Mod: GP

## 2021-01-01 PROCEDURE — 99222 1ST HOSP IP/OBS MODERATE 55: CPT | Mod: 25 | Performed by: INTERNAL MEDICINE

## 2021-01-01 PROCEDURE — 85610 PROTHROMBIN TIME: CPT | Performed by: NURSE PRACTITIONER

## 2021-01-01 PROCEDURE — 255N000002 HC RX 255 OP 636: Performed by: STUDENT IN AN ORGANIZED HEALTH CARE EDUCATION/TRAINING PROGRAM

## 2021-01-01 PROCEDURE — 99205 OFFICE O/P NEW HI 60 MIN: CPT | Performed by: NURSE PRACTITIONER

## 2021-01-01 PROCEDURE — 84466 ASSAY OF TRANSFERRIN: CPT | Performed by: NURSE PRACTITIONER

## 2021-01-01 PROCEDURE — U0005 INFEC AGEN DETEC AMPLI PROBE: HCPCS | Performed by: INTERNAL MEDICINE

## 2021-01-01 PROCEDURE — 82247 BILIRUBIN TOTAL: CPT

## 2021-01-01 PROCEDURE — 80307 DRUG TEST PRSMV CHEM ANLYZR: CPT | Performed by: STUDENT IN AN ORGANIZED HEALTH CARE EDUCATION/TRAINING PROGRAM

## 2021-01-01 PROCEDURE — 999N000216 HC STATISTIC ADULT CD FACE TO FACE-NO CHRG

## 2021-01-01 PROCEDURE — 83690 ASSAY OF LIPASE: CPT | Performed by: NURSE PRACTITIONER

## 2021-01-01 PROCEDURE — 255N000002 HC RX 255 OP 636: Performed by: INTERNAL MEDICINE

## 2021-01-01 PROCEDURE — 81001 URINALYSIS AUTO W/SCOPE: CPT | Performed by: NURSE PRACTITIONER

## 2021-01-01 PROCEDURE — 97530 THERAPEUTIC ACTIVITIES: CPT | Mod: GO | Performed by: OCCUPATIONAL THERAPIST

## 2021-01-01 PROCEDURE — 86900 BLOOD TYPING SEROLOGIC ABO: CPT | Performed by: EMERGENCY MEDICINE

## 2021-01-01 PROCEDURE — 4A1239Z MONITORING OF CARDIAC OUTPUT, PERCUTANEOUS APPROACH: ICD-10-PCS | Performed by: INTERNAL MEDICINE

## 2021-01-01 PROCEDURE — 84550 ASSAY OF BLOOD/URIC ACID: CPT | Performed by: NURSE PRACTITIONER

## 2021-01-01 PROCEDURE — 88305 TISSUE EXAM BY PATHOLOGIST: CPT | Mod: 26 | Performed by: PATHOLOGY

## 2021-01-01 PROCEDURE — 36415 COLL VENOUS BLD VENIPUNCTURE: CPT | Performed by: STUDENT IN AN ORGANIZED HEALTH CARE EDUCATION/TRAINING PROGRAM

## 2021-01-01 PROCEDURE — 99232 SBSQ HOSP IP/OBS MODERATE 35: CPT | Performed by: INTERNAL MEDICINE

## 2021-01-01 PROCEDURE — 85014 HEMATOCRIT: CPT | Performed by: STUDENT IN AN ORGANIZED HEALTH CARE EDUCATION/TRAINING PROGRAM

## 2021-01-01 PROCEDURE — 999N000111 HC STATISTIC OT IP EVAL DEFER

## 2021-01-01 PROCEDURE — 84484 ASSAY OF TROPONIN QUANT: CPT

## 2021-01-01 PROCEDURE — 83036 HEMOGLOBIN GLYCOSYLATED A1C: CPT | Performed by: STUDENT IN AN ORGANIZED HEALTH CARE EDUCATION/TRAINING PROGRAM

## 2021-01-01 PROCEDURE — 999N000128 HC STATISTIC PERIPHERAL IV START W/O US GUIDANCE

## 2021-01-01 PROCEDURE — 45382 COLONOSCOPY W/CONTROL BLEED: CPT | Performed by: INTERNAL MEDICINE

## 2021-01-01 PROCEDURE — 96139 PSYCL/NRPSYC TST TECH EA: CPT | Performed by: CLINICAL NEUROPSYCHOLOGIST

## 2021-01-01 PROCEDURE — G1004 CDSM NDSC: HCPCS

## 2021-01-01 PROCEDURE — 84132 ASSAY OF SERUM POTASSIUM: CPT | Performed by: INTERNAL MEDICINE

## 2021-01-01 PROCEDURE — 36415 COLL VENOUS BLD VENIPUNCTURE: CPT | Performed by: EMERGENCY MEDICINE

## 2021-01-01 PROCEDURE — 99153 MOD SED SAME PHYS/QHP EA: CPT | Performed by: INTERNAL MEDICINE

## 2021-01-01 PROCEDURE — 99495 TRANSJ CARE MGMT MOD F2F 14D: CPT | Performed by: PHYSICIAN ASSISTANT

## 2021-01-01 PROCEDURE — 999N000007 HC SITE CHECK

## 2021-01-01 PROCEDURE — C9803 HOPD COVID-19 SPEC COLLECT: HCPCS

## 2021-01-01 PROCEDURE — 84153 ASSAY OF PSA TOTAL: CPT | Performed by: STUDENT IN AN ORGANIZED HEALTH CARE EDUCATION/TRAINING PROGRAM

## 2021-01-01 PROCEDURE — U0003 INFECTIOUS AGENT DETECTION BY NUCLEIC ACID (DNA OR RNA); SEVERE ACUTE RESPIRATORY SYNDROME CORONAVIRUS 2 (SARS-COV-2) (CORONAVIRUS DISEASE [COVID-19]), AMPLIFIED PROBE TECHNIQUE, MAKING USE OF HIGH THROUGHPUT TECHNOLOGIES AS DESCRIBED BY CMS-2020-01-R: HCPCS | Performed by: STUDENT IN AN ORGANIZED HEALTH CARE EDUCATION/TRAINING PROGRAM

## 2021-01-01 PROCEDURE — 99223 1ST HOSP IP/OBS HIGH 75: CPT | Performed by: STUDENT IN AN ORGANIZED HEALTH CARE EDUCATION/TRAINING PROGRAM

## 2021-01-01 PROCEDURE — 97165 OT EVAL LOW COMPLEX 30 MIN: CPT | Mod: GO

## 2021-01-01 PROCEDURE — 84439 ASSAY OF FREE THYROXINE: CPT | Performed by: STUDENT IN AN ORGANIZED HEALTH CARE EDUCATION/TRAINING PROGRAM

## 2021-01-01 PROCEDURE — 82610 CYSTATIN C: CPT | Performed by: STUDENT IN AN ORGANIZED HEALTH CARE EDUCATION/TRAINING PROGRAM

## 2021-01-01 PROCEDURE — 74176 CT ABD & PELVIS W/O CONTRAST: CPT | Mod: 26 | Performed by: RADIOLOGY

## 2021-01-01 PROCEDURE — 93922 UPR/L XTREMITY ART 2 LEVELS: CPT | Mod: 26 | Performed by: RADIOLOGY

## 2021-01-01 PROCEDURE — 99214 OFFICE O/P EST MOD 30 MIN: CPT | Performed by: NURSE PRACTITIONER

## 2021-01-01 PROCEDURE — 83550 IRON BINDING TEST: CPT | Performed by: STUDENT IN AN ORGANIZED HEALTH CARE EDUCATION/TRAINING PROGRAM

## 2021-01-01 PROCEDURE — 80048 BASIC METABOLIC PNL TOTAL CA: CPT | Performed by: NURSE PRACTITIONER

## 2021-01-01 PROCEDURE — 82040 ASSAY OF SERUM ALBUMIN: CPT | Performed by: PHYSICIAN ASSISTANT

## 2021-01-01 PROCEDURE — 272N000450 HC KIT 4FR POWER PICC SINGLE LUMEN

## 2021-01-01 PROCEDURE — C9803 HOPD COVID-19 SPEC COLLECT: HCPCS | Performed by: STUDENT IN AN ORGANIZED HEALTH CARE EDUCATION/TRAINING PROGRAM

## 2021-01-01 PROCEDURE — 93922 UPR/L XTREMITY ART 2 LEVELS: CPT

## 2021-01-01 PROCEDURE — 84443 ASSAY THYROID STIM HORMONE: CPT | Performed by: STUDENT IN AN ORGANIZED HEALTH CARE EDUCATION/TRAINING PROGRAM

## 2021-01-01 PROCEDURE — 85027 COMPLETE CBC AUTOMATED: CPT | Performed by: PATHOLOGY

## 2021-01-01 PROCEDURE — 82330 ASSAY OF CALCIUM: CPT

## 2021-01-01 PROCEDURE — 82310 ASSAY OF CALCIUM: CPT | Performed by: STUDENT IN AN ORGANIZED HEALTH CARE EDUCATION/TRAINING PROGRAM

## 2021-01-01 PROCEDURE — 3E043XZ INTRODUCTION OF VASOPRESSOR INTO CENTRAL VEIN, PERCUTANEOUS APPROACH: ICD-10-PCS | Performed by: INTERNAL MEDICINE

## 2021-01-01 PROCEDURE — 82248 BILIRUBIN DIRECT: CPT | Performed by: PATHOLOGY

## 2021-01-01 PROCEDURE — 85610 PROTHROMBIN TIME: CPT | Performed by: EMERGENCY MEDICINE

## 2021-01-01 PROCEDURE — 83735 ASSAY OF MAGNESIUM: CPT | Performed by: INTERNAL MEDICINE

## 2021-01-01 PROCEDURE — 76700 US EXAM ABDOM COMPLETE: CPT

## 2021-01-01 PROCEDURE — 0DBH8ZZ EXCISION OF CECUM, VIA NATURAL OR ARTIFICIAL OPENING ENDOSCOPIC: ICD-10-PCS | Performed by: INTERNAL MEDICINE

## 2021-01-01 PROCEDURE — 83880 ASSAY OF NATRIURETIC PEPTIDE: CPT | Performed by: NURSE PRACTITIONER

## 2021-01-01 PROCEDURE — 88305 TISSUE EXAM BY PATHOLOGIST: CPT | Mod: TC | Performed by: INTERNAL MEDICINE

## 2021-01-01 PROCEDURE — C8929 TTE W OR WO FOL WCON,DOPPLER: HCPCS

## 2021-01-01 PROCEDURE — 93925 LOWER EXTREMITY STUDY: CPT

## 2021-01-01 RX ORDER — LIDOCAINE 40 MG/G
CREAM TOPICAL
Status: ACTIVE | OUTPATIENT
Start: 2021-01-01 | End: 2021-01-01

## 2021-01-01 RX ORDER — POTASSIUM CHLORIDE 750 MG/1
20 TABLET, EXTENDED RELEASE ORAL ONCE
Status: COMPLETED | OUTPATIENT
Start: 2021-01-01 | End: 2021-01-01

## 2021-01-01 RX ORDER — MILRINONE LACTATE 0.2 MG/ML
0.38 INJECTION, SOLUTION INTRAVENOUS CONTINUOUS
Status: DISCONTINUED | OUTPATIENT
Start: 2021-01-01 | End: 2021-01-01 | Stop reason: HOSPADM

## 2021-01-01 RX ORDER — MAGNESIUM OXIDE 400 MG/1
400 TABLET ORAL DAILY
Qty: 30 TABLET | Refills: 1 | Status: ON HOLD | OUTPATIENT
Start: 2021-01-01 | End: 2021-01-01

## 2021-01-01 RX ORDER — PANTOPRAZOLE SODIUM 40 MG/1
40 TABLET, DELAYED RELEASE ORAL
Qty: 30 TABLET | Refills: 0 | Status: SHIPPED | OUTPATIENT
Start: 2021-01-01 | End: 2022-01-01

## 2021-01-01 RX ORDER — POTASSIUM CHLORIDE 1.5 G/1.58G
40 POWDER, FOR SOLUTION ORAL ONCE
Status: COMPLETED | OUTPATIENT
Start: 2021-01-01 | End: 2021-01-01

## 2021-01-01 RX ORDER — SPIRONOLACTONE 25 MG/1
25 TABLET ORAL DAILY
Status: DISCONTINUED | OUTPATIENT
Start: 2021-01-01 | End: 2021-01-01 | Stop reason: HOSPADM

## 2021-01-01 RX ORDER — MAGNESIUM SULFATE HEPTAHYDRATE 40 MG/ML
2 INJECTION, SOLUTION INTRAVENOUS ONCE
Status: COMPLETED | OUTPATIENT
Start: 2021-01-01 | End: 2021-01-01

## 2021-01-01 RX ORDER — POTASSIUM CHLORIDE 1500 MG/1
TABLET, EXTENDED RELEASE ORAL
Qty: 90 TABLET | Refills: 3 | Status: SHIPPED | OUTPATIENT
Start: 2021-01-01 | End: 2021-01-01

## 2021-01-01 RX ORDER — POTASSIUM CHLORIDE 1500 MG/1
40 TABLET, EXTENDED RELEASE ORAL 2 TIMES DAILY
Qty: 120 TABLET | Refills: 1 | Status: ON HOLD | OUTPATIENT
Start: 2021-01-01 | End: 2021-01-01

## 2021-01-01 RX ORDER — FENTANYL CITRATE 50 UG/ML
INJECTION, SOLUTION INTRAMUSCULAR; INTRAVENOUS PRN
Status: COMPLETED | OUTPATIENT
Start: 2021-01-01 | End: 2021-01-01

## 2021-01-01 RX ORDER — TORSEMIDE 10 MG/1
80 TABLET ORAL 2 TIMES DAILY
Qty: 600 TABLET | Refills: 1 | Status: SHIPPED | OUTPATIENT
Start: 2021-01-01 | End: 2021-01-01

## 2021-01-01 RX ORDER — FUROSEMIDE 10 MG/ML
80 INJECTION INTRAMUSCULAR; INTRAVENOUS ONCE
Status: COMPLETED | OUTPATIENT
Start: 2021-01-01 | End: 2021-01-01

## 2021-01-01 RX ORDER — MILRINONE LACTATE 0.2 MG/ML
0.25 INJECTION, SOLUTION INTRAVENOUS CONTINUOUS
Status: DISCONTINUED | OUTPATIENT
Start: 2021-01-01 | End: 2021-01-01 | Stop reason: HOSPADM

## 2021-01-01 RX ORDER — HYDROMORPHONE HCL IN WATER/PF 6 MG/30 ML
0.2 PATIENT CONTROLLED ANALGESIA SYRINGE INTRAVENOUS EVERY 4 HOURS PRN
Status: DISCONTINUED | OUTPATIENT
Start: 2021-01-01 | End: 2021-01-01

## 2021-01-01 RX ORDER — POTASSIUM CHLORIDE 1500 MG/1
20 TABLET, EXTENDED RELEASE ORAL ONCE
Status: COMPLETED | OUTPATIENT
Start: 2021-01-01 | End: 2021-01-01

## 2021-01-01 RX ORDER — METHYLPREDNISOLONE SODIUM SUCCINATE 125 MG/2ML
125 INJECTION, POWDER, LYOPHILIZED, FOR SOLUTION INTRAMUSCULAR; INTRAVENOUS
Status: ACTIVE | OUTPATIENT
Start: 2021-01-01 | End: 2021-01-01

## 2021-01-01 RX ORDER — POTASSIUM CHLORIDE 750 MG/1
40 TABLET, EXTENDED RELEASE ORAL ONCE
Status: COMPLETED | OUTPATIENT
Start: 2021-01-01 | End: 2021-01-01

## 2021-01-01 RX ORDER — MAGNESIUM HYDROXIDE/ALUMINUM HYDROXICE/SIMETHICONE 120; 1200; 1200 MG/30ML; MG/30ML; MG/30ML
30 SUSPENSION ORAL EVERY 4 HOURS PRN
Status: DISCONTINUED | OUTPATIENT
Start: 2021-01-01 | End: 2021-01-01 | Stop reason: HOSPADM

## 2021-01-01 RX ORDER — ALLOPURINOL 100 MG/1
100 TABLET ORAL DAILY
Status: DISCONTINUED | OUTPATIENT
Start: 2021-01-01 | End: 2021-01-01 | Stop reason: HOSPADM

## 2021-01-01 RX ORDER — TORSEMIDE 20 MG/1
40 TABLET ORAL
Status: DISCONTINUED | OUTPATIENT
Start: 2021-01-01 | End: 2021-01-01

## 2021-01-01 RX ORDER — ISOSORBIDE DINITRATE 10 MG/1
20 TABLET ORAL EVERY 8 HOURS SCHEDULED
Status: DISCONTINUED | OUTPATIENT
Start: 2021-01-01 | End: 2021-01-01

## 2021-01-01 RX ORDER — FUROSEMIDE 40 MG
40 TABLET ORAL 2 TIMES DAILY
Status: ON HOLD | COMMUNITY
Start: 2021-01-01 | End: 2021-01-01

## 2021-01-01 RX ORDER — ESCITALOPRAM OXALATE 10 MG/1
10 TABLET ORAL DAILY
Status: DISCONTINUED | OUTPATIENT
Start: 2021-01-01 | End: 2021-01-01 | Stop reason: HOSPADM

## 2021-01-01 RX ORDER — AMOXICILLIN 250 MG
1 CAPSULE ORAL 2 TIMES DAILY PRN
Status: DISCONTINUED | OUTPATIENT
Start: 2021-01-01 | End: 2021-01-01 | Stop reason: HOSPADM

## 2021-01-01 RX ORDER — ALLOPURINOL 100 MG/1
300 TABLET ORAL DAILY
COMMUNITY
End: 2021-01-01

## 2021-01-01 RX ORDER — SIMETHICONE 80 MG
80 TABLET,CHEWABLE ORAL EVERY 6 HOURS PRN
Status: DISCONTINUED | OUTPATIENT
Start: 2021-01-01 | End: 2021-01-01 | Stop reason: HOSPADM

## 2021-01-01 RX ORDER — ALLOPURINOL 100 MG/1
100 TABLET ORAL DAILY
Qty: 90 TABLET | Refills: 1 | Status: SHIPPED | OUTPATIENT
Start: 2021-01-01 | End: 2021-01-01

## 2021-01-01 RX ORDER — ISOSORBIDE DINITRATE 10 MG/1
40 TABLET ORAL EVERY 8 HOURS SCHEDULED
Status: DISCONTINUED | OUTPATIENT
Start: 2021-01-01 | End: 2021-01-01

## 2021-01-01 RX ORDER — POTASSIUM CHLORIDE 29.8 MG/ML
20 INJECTION INTRAVENOUS ONCE
Status: COMPLETED | OUTPATIENT
Start: 2021-01-01 | End: 2021-01-01

## 2021-01-01 RX ORDER — TORSEMIDE 20 MG/1
60 TABLET ORAL
Status: DISCONTINUED | OUTPATIENT
Start: 2021-01-01 | End: 2021-01-01

## 2021-01-01 RX ORDER — ISOSORBIDE DINITRATE 10 MG/1
30 TABLET ORAL EVERY 8 HOURS
Status: DISCONTINUED | OUTPATIENT
Start: 2021-01-01 | End: 2021-01-01

## 2021-01-01 RX ORDER — HYDRALAZINE HYDROCHLORIDE 50 MG/1
50 TABLET, FILM COATED ORAL EVERY 8 HOURS SCHEDULED
Status: DISCONTINUED | OUTPATIENT
Start: 2021-01-01 | End: 2021-01-01

## 2021-01-01 RX ORDER — ACETAMINOPHEN 325 MG/1
650 TABLET ORAL EVERY 4 HOURS PRN
Status: DISCONTINUED | OUTPATIENT
Start: 2021-01-01 | End: 2021-01-01 | Stop reason: HOSPADM

## 2021-01-01 RX ORDER — LOSARTAN POTASSIUM 25 MG/1
25 TABLET ORAL 2 TIMES DAILY
Status: ON HOLD | COMMUNITY
Start: 2021-01-01 | End: 2021-01-01

## 2021-01-01 RX ORDER — COLCHICINE 0.6 MG/1
0.6 TABLET ORAL DAILY
COMMUNITY
End: 2021-01-01

## 2021-01-01 RX ORDER — TRAMADOL HYDROCHLORIDE 50 MG/1
50 TABLET ORAL EVERY 6 HOURS PRN
Status: DISCONTINUED | OUTPATIENT
Start: 2021-01-01 | End: 2021-01-01 | Stop reason: HOSPADM

## 2021-01-01 RX ORDER — ESCITALOPRAM OXALATE 10 MG/1
10 TABLET ORAL AT BEDTIME
Qty: 30 TABLET | Refills: 1 | Status: ON HOLD | OUTPATIENT
Start: 2021-01-01 | End: 2021-01-01

## 2021-01-01 RX ORDER — PANTOPRAZOLE SODIUM 40 MG/1
40 TABLET, DELAYED RELEASE ORAL
Status: DISCONTINUED | OUTPATIENT
Start: 2021-01-01 | End: 2021-01-01

## 2021-01-01 RX ORDER — FUROSEMIDE 10 MG/ML
40 INJECTION INTRAMUSCULAR; INTRAVENOUS ONCE
Status: COMPLETED | OUTPATIENT
Start: 2021-01-01 | End: 2021-01-01

## 2021-01-01 RX ORDER — SENNOSIDES A AND B 8.6 MG/1
1 TABLET, FILM COATED ORAL 2 TIMES DAILY
Status: ON HOLD | COMMUNITY
Start: 2021-01-01 | End: 2021-01-01

## 2021-01-01 RX ORDER — LANOLIN ALCOHOL/MO/W.PET/CERES
3 CREAM (GRAM) TOPICAL
Status: DISCONTINUED | OUTPATIENT
Start: 2021-01-01 | End: 2021-01-01

## 2021-01-01 RX ORDER — MAGNESIUM OXIDE 400 MG/1
400 TABLET ORAL 2 TIMES DAILY
Status: DISCONTINUED | OUTPATIENT
Start: 2021-01-01 | End: 2021-01-01 | Stop reason: HOSPADM

## 2021-01-01 RX ORDER — DIPHENHYDRAMINE HYDROCHLORIDE 50 MG/ML
50 INJECTION INTRAMUSCULAR; INTRAVENOUS
Status: DISPENSED | OUTPATIENT
Start: 2021-01-01 | End: 2021-01-01

## 2021-01-01 RX ORDER — FUROSEMIDE 10 MG/ML
160 INJECTION INTRAMUSCULAR; INTRAVENOUS ONCE
Status: COMPLETED | OUTPATIENT
Start: 2021-01-01 | End: 2021-01-01

## 2021-01-01 RX ORDER — RAMELTEON 8 MG/1
8 TABLET ORAL EVERY EVENING
Qty: 30 TABLET | Refills: 0 | Status: SHIPPED | OUTPATIENT
Start: 2021-01-01 | End: 2021-01-01

## 2021-01-01 RX ORDER — HYDROXYZINE HYDROCHLORIDE 50 MG/1
50 TABLET, FILM COATED ORAL EVERY 6 HOURS PRN
Status: DISCONTINUED | OUTPATIENT
Start: 2021-01-01 | End: 2021-01-01 | Stop reason: HOSPADM

## 2021-01-01 RX ORDER — ISOSORBIDE DINITRATE 20 MG/1
40 TABLET ORAL EVERY 8 HOURS SCHEDULED
Status: DISCONTINUED | OUTPATIENT
Start: 2021-01-01 | End: 2021-01-01 | Stop reason: HOSPADM

## 2021-01-01 RX ORDER — NOREPINEPHRINE BITARTRATE 0.06 MG/ML
.01-.03 INJECTION, SOLUTION INTRAVENOUS CONTINUOUS
Status: DISCONTINUED | OUTPATIENT
Start: 2021-01-01 | End: 2021-01-01

## 2021-01-01 RX ORDER — MAGNESIUM OXIDE 400 MG/1
400 TABLET ORAL 2 TIMES DAILY
Qty: 60 TABLET | Refills: 0 | Status: SHIPPED | OUTPATIENT
Start: 2021-01-01 | End: 2022-01-01

## 2021-01-01 RX ORDER — FUROSEMIDE 10 MG/ML
60 INJECTION INTRAMUSCULAR; INTRAVENOUS ONCE
Status: COMPLETED | OUTPATIENT
Start: 2021-01-01 | End: 2021-01-01

## 2021-01-01 RX ORDER — HYDRALAZINE HYDROCHLORIDE 25 MG/1
75 TABLET, FILM COATED ORAL EVERY 8 HOURS
Qty: 270 TABLET | Refills: 0 | Status: SHIPPED | OUTPATIENT
Start: 2021-01-01 | End: 2021-01-01

## 2021-01-01 RX ORDER — HEPARIN SODIUM,PORCINE 10 UNIT/ML
5-20 VIAL (ML) INTRAVENOUS EVERY 24 HOURS
Status: DISCONTINUED | OUTPATIENT
Start: 2021-01-01 | End: 2021-01-01 | Stop reason: HOSPADM

## 2021-01-01 RX ORDER — TORSEMIDE 10 MG/1
TABLET ORAL
Qty: 600 TABLET | Refills: 1 | Status: ON HOLD | OUTPATIENT
Start: 2021-01-01 | End: 2021-01-01

## 2021-01-01 RX ORDER — FUROSEMIDE 10 MG/ML
60 INJECTION INTRAMUSCULAR; INTRAVENOUS 2 TIMES DAILY
Status: DISCONTINUED | OUTPATIENT
Start: 2021-01-01 | End: 2021-01-01

## 2021-01-01 RX ORDER — ACETAMINOPHEN 650 MG/1
650 SUPPOSITORY RECTAL EVERY 4 HOURS PRN
Status: DISCONTINUED | OUTPATIENT
Start: 2021-01-01 | End: 2021-01-01 | Stop reason: HOSPADM

## 2021-01-01 RX ORDER — TORSEMIDE 10 MG/1
TABLET ORAL
Qty: 150 TABLET | Refills: 1 | Status: SHIPPED | OUTPATIENT
Start: 2021-01-01 | End: 2021-01-01

## 2021-01-01 RX ORDER — HYDROMORPHONE HCL IN WATER/PF 6 MG/30 ML
0.2 PATIENT CONTROLLED ANALGESIA SYRINGE INTRAVENOUS EVERY 8 HOURS PRN
Status: DISCONTINUED | OUTPATIENT
Start: 2021-01-01 | End: 2021-01-01

## 2021-01-01 RX ORDER — NOREPINEPHRINE BITARTRATE 0.06 MG/ML
INJECTION, SOLUTION INTRAVENOUS
Status: COMPLETED
Start: 2021-01-01 | End: 2021-01-01

## 2021-01-01 RX ORDER — FUROSEMIDE 80 MG
80 TABLET ORAL ONCE
Status: COMPLETED | OUTPATIENT
Start: 2021-01-01 | End: 2021-01-01

## 2021-01-01 RX ORDER — HYDROXYZINE HYDROCHLORIDE 25 MG/1
25 TABLET, FILM COATED ORAL
Status: DISCONTINUED | OUTPATIENT
Start: 2021-01-01 | End: 2021-01-01

## 2021-01-01 RX ORDER — DOBUTAMINE HYDROCHLORIDE 200 MG/100ML
2.5 INJECTION INTRAVENOUS CONTINUOUS
Status: DISCONTINUED | OUTPATIENT
Start: 2021-01-01 | End: 2021-01-01

## 2021-01-01 RX ORDER — POLYETHYLENE GLYCOL 3350 17 G/17G
17 POWDER, FOR SOLUTION ORAL 2 TIMES DAILY PRN
Status: ON HOLD | COMMUNITY
Start: 2021-01-01 | End: 2021-01-01

## 2021-01-01 RX ORDER — MULTIPLE VITAMINS W/ MINERALS TAB 9MG-400MCG
1 TAB ORAL DAILY
Status: DISCONTINUED | OUTPATIENT
Start: 2021-01-01 | End: 2021-01-01 | Stop reason: HOSPADM

## 2021-01-01 RX ORDER — POTASSIUM CHLORIDE 1500 MG/1
TABLET, EXTENDED RELEASE ORAL
Qty: 90 TABLET | Refills: 3 | Status: SHIPPED | OUTPATIENT
Start: 2021-01-01

## 2021-01-01 RX ORDER — POTASSIUM CHLORIDE 1500 MG/1
20 TABLET, EXTENDED RELEASE ORAL 2 TIMES DAILY
Qty: 60 TABLET | Refills: 0 | Status: SHIPPED | OUTPATIENT
Start: 2021-01-01 | End: 2021-01-01

## 2021-01-01 RX ORDER — COLCHICINE 0.6 MG/1
0.6 TABLET ORAL DAILY
Status: DISCONTINUED | OUTPATIENT
Start: 2021-01-01 | End: 2021-01-01 | Stop reason: HOSPADM

## 2021-01-01 RX ORDER — RAMELTEON 8 MG/1
8 TABLET ORAL
Status: DISCONTINUED | OUTPATIENT
Start: 2021-01-01 | End: 2021-01-01 | Stop reason: HOSPADM

## 2021-01-01 RX ORDER — FUROSEMIDE 10 MG/ML
100 INJECTION INTRAMUSCULAR; INTRAVENOUS ONCE
Status: COMPLETED | OUTPATIENT
Start: 2021-01-01 | End: 2021-01-01

## 2021-01-01 RX ORDER — AMOXICILLIN 250 MG
2 CAPSULE ORAL 2 TIMES DAILY PRN
Status: DISCONTINUED | OUTPATIENT
Start: 2021-01-01 | End: 2021-01-01 | Stop reason: HOSPADM

## 2021-01-01 RX ORDER — COLCHICINE 0.6 MG/1
0.6 TABLET ORAL DAILY
Qty: 90 TABLET | Refills: 1 | Status: SHIPPED | OUTPATIENT
Start: 2021-01-01

## 2021-01-01 RX ORDER — POLYETHYLENE GLYCOL 3350 17 G/17G
17 POWDER, FOR SOLUTION ORAL DAILY PRN
Status: DISCONTINUED | OUTPATIENT
Start: 2021-01-01 | End: 2021-01-01 | Stop reason: HOSPADM

## 2021-01-01 RX ORDER — TORSEMIDE 20 MG/1
60 TABLET ORAL
Qty: 180 TABLET | Refills: 0 | Status: SHIPPED | OUTPATIENT
Start: 2021-01-01 | End: 2022-01-01

## 2021-01-01 RX ORDER — FUROSEMIDE 40 MG
40 TABLET ORAL 2 TIMES DAILY
Status: DISCONTINUED | OUTPATIENT
Start: 2021-01-01 | End: 2021-01-01

## 2021-01-01 RX ORDER — SPIRONOLACTONE 25 MG/1
25 TABLET ORAL DAILY
Qty: 30 TABLET | Refills: 0 | Status: SHIPPED | OUTPATIENT
Start: 2021-01-01 | End: 2022-01-01

## 2021-01-01 RX ORDER — AMOXICILLIN 250 MG
1 CAPSULE ORAL 2 TIMES DAILY PRN
Start: 2021-01-01

## 2021-01-01 RX ORDER — PREDNISONE 20 MG/1
20 TABLET ORAL PRN
COMMUNITY
Start: 2021-01-01

## 2021-01-01 RX ORDER — HYDRALAZINE HYDROCHLORIDE 25 MG/1
25 TABLET, FILM COATED ORAL EVERY 8 HOURS SCHEDULED
Status: DISCONTINUED | OUTPATIENT
Start: 2021-01-01 | End: 2021-01-01

## 2021-01-01 RX ORDER — ALLOPURINOL 300 MG/1
300 TABLET ORAL DAILY
Status: DISCONTINUED | OUTPATIENT
Start: 2021-01-01 | End: 2021-01-01 | Stop reason: HOSPADM

## 2021-01-01 RX ORDER — RAMELTEON 8 MG/1
8 TABLET ORAL EVERY EVENING
Qty: 30 TABLET | Refills: 0 | Status: ON HOLD | OUTPATIENT
Start: 2021-01-01 | End: 2021-01-01

## 2021-01-01 RX ORDER — ESCITALOPRAM OXALATE 10 MG/1
10 TABLET ORAL AT BEDTIME
Status: DISCONTINUED | OUTPATIENT
Start: 2021-01-01 | End: 2021-01-01

## 2021-01-01 RX ORDER — ACETAMINOPHEN 325 MG/1
650 TABLET ORAL EVERY 4 HOURS PRN
Start: 2021-01-01

## 2021-01-01 RX ORDER — HYDROXYZINE HYDROCHLORIDE 25 MG/1
25 TABLET, FILM COATED ORAL EVERY 6 HOURS PRN
Status: DISCONTINUED | OUTPATIENT
Start: 2021-01-01 | End: 2021-01-01 | Stop reason: HOSPADM

## 2021-01-01 RX ORDER — POTASSIUM CHLORIDE 750 MG/1
40 TABLET, EXTENDED RELEASE ORAL 2 TIMES DAILY
Status: DISCONTINUED | OUTPATIENT
Start: 2021-01-01 | End: 2021-01-01 | Stop reason: HOSPADM

## 2021-01-01 RX ORDER — MILRINONE LACTATE 0.2 MG/ML
0.12 INJECTION, SOLUTION INTRAVENOUS CONTINUOUS
Status: CANCELLED | OUTPATIENT
Start: 2021-01-01

## 2021-01-01 RX ORDER — FUROSEMIDE 10 MG/ML
40 INJECTION INTRAMUSCULAR; INTRAVENOUS ONCE
Status: CANCELLED | OUTPATIENT
Start: 2021-01-01

## 2021-01-01 RX ORDER — LIDOCAINE 40 MG/G
CREAM TOPICAL
Status: CANCELLED | OUTPATIENT
Start: 2021-01-01

## 2021-01-01 RX ORDER — HYDROMORPHONE HYDROCHLORIDE 1 MG/ML
0.3 INJECTION, SOLUTION INTRAMUSCULAR; INTRAVENOUS; SUBCUTANEOUS ONCE
Status: COMPLETED | OUTPATIENT
Start: 2021-01-01 | End: 2021-01-01

## 2021-01-01 RX ORDER — POTASSIUM CHLORIDE 1500 MG/1
60 TABLET, EXTENDED RELEASE ORAL ONCE
Status: COMPLETED | OUTPATIENT
Start: 2021-01-01 | End: 2021-01-01

## 2021-01-01 RX ORDER — SACUBITRIL AND VALSARTAN 24; 26 MG/1; MG/1
1 TABLET, FILM COATED ORAL 2 TIMES DAILY
Qty: 60 TABLET | Refills: 1 | Status: SHIPPED | OUTPATIENT
Start: 2021-01-01

## 2021-01-01 RX ORDER — FUROSEMIDE 10 MG/ML
80 INJECTION INTRAMUSCULAR; INTRAVENOUS
Status: DISCONTINUED | OUTPATIENT
Start: 2021-01-01 | End: 2021-01-01

## 2021-01-01 RX ORDER — ESCITALOPRAM OXALATE 10 MG/1
10 TABLET ORAL AT BEDTIME
Qty: 30 TABLET | Refills: 0 | Status: SHIPPED | OUTPATIENT
Start: 2021-01-01 | End: 2022-01-01

## 2021-01-01 RX ORDER — POTASSIUM CHLORIDE 29.8 MG/ML
20 INJECTION INTRAVENOUS
Status: COMPLETED | OUTPATIENT
Start: 2021-01-01 | End: 2021-01-01

## 2021-01-01 RX ORDER — MILRINONE LACTATE 0.2 MG/ML
0.25 INJECTION, SOLUTION INTRAVENOUS CONTINUOUS
Status: ON HOLD
Start: 2021-01-01 | End: 2021-01-01

## 2021-01-01 RX ORDER — HEPARIN SODIUM,PORCINE 10 UNIT/ML
5-20 VIAL (ML) INTRAVENOUS
Status: DISCONTINUED | OUTPATIENT
Start: 2021-01-01 | End: 2021-01-01 | Stop reason: HOSPADM

## 2021-01-01 RX ORDER — ACETAMINOPHEN 325 MG/1
975 TABLET ORAL EVERY 8 HOURS SCHEDULED
Status: DISCONTINUED | OUTPATIENT
Start: 2021-01-01 | End: 2021-01-01 | Stop reason: HOSPADM

## 2021-01-01 RX ORDER — LORAZEPAM 2 MG/ML
0.5 INJECTION INTRAMUSCULAR ONCE
Status: COMPLETED | OUTPATIENT
Start: 2021-01-01 | End: 2021-01-01

## 2021-01-01 RX ORDER — LORAZEPAM 2 MG/ML
0.25 INJECTION INTRAMUSCULAR ONCE
Status: COMPLETED | OUTPATIENT
Start: 2021-01-01 | End: 2021-01-01

## 2021-01-01 RX ORDER — NALOXONE HYDROCHLORIDE 0.4 MG/ML
0.2 INJECTION, SOLUTION INTRAMUSCULAR; INTRAVENOUS; SUBCUTANEOUS
Status: DISCONTINUED | OUTPATIENT
Start: 2021-01-01 | End: 2021-01-01 | Stop reason: HOSPADM

## 2021-01-01 RX ORDER — ISOSORBIDE DINITRATE 10 MG/1
20 TABLET ORAL EVERY 8 HOURS
Status: DISCONTINUED | OUTPATIENT
Start: 2021-01-01 | End: 2021-01-01

## 2021-01-01 RX ORDER — ESCITALOPRAM OXALATE 10 MG/1
10 TABLET ORAL AT BEDTIME
Status: DISCONTINUED | OUTPATIENT
Start: 2021-01-01 | End: 2021-01-01 | Stop reason: HOSPADM

## 2021-01-01 RX ORDER — ISOSORBIDE MONONITRATE 120 MG/1
120 TABLET, EXTENDED RELEASE ORAL EVERY MORNING
Qty: 30 TABLET | Refills: 0 | Status: SHIPPED | OUTPATIENT
Start: 2021-01-01 | End: 2022-01-01

## 2021-01-01 RX ORDER — MAGNESIUM HYDROXIDE/ALUMINUM HYDROXICE/SIMETHICONE 120; 1200; 1200 MG/30ML; MG/30ML; MG/30ML
30 SUSPENSION ORAL EVERY 4 HOURS PRN
Qty: 355 ML | Refills: 0 | Status: SHIPPED | OUTPATIENT
Start: 2021-01-01

## 2021-01-01 RX ORDER — AMOXICILLIN 250 MG
3 CAPSULE ORAL 2 TIMES DAILY PRN
Status: DISCONTINUED | OUTPATIENT
Start: 2021-01-01 | End: 2021-01-01

## 2021-01-01 RX ORDER — PANTOPRAZOLE SODIUM 40 MG/1
40 TABLET, DELAYED RELEASE ORAL
Status: DISCONTINUED | OUTPATIENT
Start: 2021-01-01 | End: 2021-01-01 | Stop reason: HOSPADM

## 2021-01-01 RX ORDER — LIDOCAINE 40 MG/G
CREAM TOPICAL
Status: DISCONTINUED | OUTPATIENT
Start: 2021-01-01 | End: 2021-01-01 | Stop reason: HOSPADM

## 2021-01-01 RX ORDER — ALLOPURINOL 100 MG/1
100 TABLET ORAL DAILY
Qty: 90 TABLET | Refills: 1 | Status: SHIPPED | OUTPATIENT
Start: 2021-01-01 | End: 2022-01-01

## 2021-01-01 RX ORDER — NALOXONE HYDROCHLORIDE 0.4 MG/ML
0.4 INJECTION, SOLUTION INTRAMUSCULAR; INTRAVENOUS; SUBCUTANEOUS
Status: DISCONTINUED | OUTPATIENT
Start: 2021-01-01 | End: 2021-01-01 | Stop reason: HOSPADM

## 2021-01-01 RX ORDER — TORSEMIDE 20 MG/1
60 TABLET ORAL
Status: DISCONTINUED | OUTPATIENT
Start: 2021-01-01 | End: 2021-01-01 | Stop reason: HOSPADM

## 2021-01-01 RX ORDER — ISOSORBIDE DINITRATE 40 MG/1
40 TABLET ORAL EVERY 8 HOURS
Qty: 90 TABLET | Refills: 0 | Status: SHIPPED | OUTPATIENT
Start: 2021-01-01 | End: 2021-01-01

## 2021-01-01 RX ORDER — ISOSORBIDE DINITRATE 10 MG/1
10 TABLET ORAL EVERY 8 HOURS
Status: DISCONTINUED | OUTPATIENT
Start: 2021-01-01 | End: 2021-01-01

## 2021-01-01 RX ORDER — RAMELTEON 8 MG/1
8 TABLET ORAL EVERY EVENING
Qty: 30 TABLET | Refills: 0 | Status: SHIPPED | OUTPATIENT
Start: 2021-01-01 | End: 2022-01-01

## 2021-01-01 RX ORDER — RAMELTEON 8 MG/1
8 TABLET ORAL EVERY EVENING
Status: DISCONTINUED | OUTPATIENT
Start: 2021-01-01 | End: 2021-01-01 | Stop reason: HOSPADM

## 2021-01-01 RX ORDER — ACETAMINOPHEN 325 MG/1
650 TABLET ORAL EVERY 4 HOURS PRN
Status: DISCONTINUED | OUTPATIENT
Start: 2021-01-01 | End: 2021-01-01

## 2021-01-01 RX ORDER — ESCITALOPRAM OXALATE 10 MG/1
10 TABLET ORAL AT BEDTIME
Qty: 30 TABLET | Refills: 1 | Status: SHIPPED | OUTPATIENT
Start: 2021-01-01 | End: 2021-01-01

## 2021-01-01 RX ORDER — POLYETHYLENE GLYCOL 3350 17 G/17G
17 POWDER, FOR SOLUTION ORAL 2 TIMES DAILY PRN
Status: DISCONTINUED | OUTPATIENT
Start: 2021-01-01 | End: 2021-01-01 | Stop reason: HOSPADM

## 2021-01-01 RX ORDER — COLCHICINE 0.6 MG/1
0.6 TABLET ORAL DAILY
Qty: 90 TABLET | Refills: 1 | Status: SHIPPED | OUTPATIENT
Start: 2021-01-01 | End: 2021-01-01

## 2021-01-01 RX ORDER — TORSEMIDE 10 MG/1
80 TABLET ORAL 2 TIMES DAILY
Qty: 600 TABLET | Refills: 1 | Status: CANCELLED | OUTPATIENT
Start: 2021-01-01

## 2021-01-01 RX ORDER — MILRINONE LACTATE 0.2 MG/ML
0.38 INJECTION, SOLUTION INTRAVENOUS CONTINUOUS
Qty: 250 ML | Refills: 0 | OUTPATIENT
Start: 2021-01-01 | End: 2022-03-08

## 2021-01-01 RX ORDER — FUROSEMIDE 20 MG
40 TABLET ORAL
Status: DISCONTINUED | OUTPATIENT
Start: 2021-01-01 | End: 2021-01-01

## 2021-01-01 RX ORDER — FUROSEMIDE 80 MG
80 TABLET ORAL
Status: DISCONTINUED | OUTPATIENT
Start: 2021-01-01 | End: 2021-01-01

## 2021-01-01 RX ADMIN — Medication 400 MG: at 20:44

## 2021-01-01 RX ADMIN — HYDRALAZINE HYDROCHLORIDE 75 MG: 50 TABLET, FILM COATED ORAL at 05:12

## 2021-01-01 RX ADMIN — PANTOPRAZOLE SODIUM 40 MG: 40 TABLET, DELAYED RELEASE ORAL at 08:45

## 2021-01-01 RX ADMIN — MILRINONE LACTATE IN DEXTROSE 0.38 MCG/KG/MIN: 200 INJECTION, SOLUTION INTRAVENOUS at 23:14

## 2021-01-01 RX ADMIN — FUROSEMIDE 60 MG: 20 TABLET ORAL at 09:52

## 2021-01-01 RX ADMIN — HYDROMORPHONE HYDROCHLORIDE 0.2 MG: 0.2 INJECTION, SOLUTION INTRAMUSCULAR; INTRAVENOUS; SUBCUTANEOUS at 21:07

## 2021-01-01 RX ADMIN — Medication 5 MG: at 23:20

## 2021-01-01 RX ADMIN — MILRINONE LACTATE IN DEXTROSE 0.25 MCG/KG/MIN: 200 INJECTION, SOLUTION INTRAVENOUS at 14:43

## 2021-01-01 RX ADMIN — DOBUTAMINE IN DEXTROSE 2.5 MCG/KG/MIN: 200 INJECTION, SOLUTION INTRAVENOUS at 23:18

## 2021-01-01 RX ADMIN — COLCHICINE 0.6 MG: 0.6 TABLET, FILM COATED ORAL at 08:45

## 2021-01-01 RX ADMIN — COLCHICINE 0.6 MG: 0.6 TABLET, FILM COATED ORAL at 10:16

## 2021-01-01 RX ADMIN — MILRINONE LACTATE IN DEXTROSE 0.5 MCG/KG/MIN: 200 INJECTION, SOLUTION INTRAVENOUS at 22:56

## 2021-01-01 RX ADMIN — ISOSORBIDE DINITRATE 10 MG: 10 TABLET ORAL at 00:31

## 2021-01-01 RX ADMIN — FUROSEMIDE 80 MG: 10 INJECTION, SOLUTION INTRAMUSCULAR; INTRAVENOUS at 13:18

## 2021-01-01 RX ADMIN — ALLOPURINOL 300 MG: 300 TABLET ORAL at 08:49

## 2021-01-01 RX ADMIN — SODIUM NITROPRUSSIDE 1.25 MCG/KG/MIN: 25 INJECTION, SOLUTION, CONCENTRATE INTRAVENOUS at 11:40

## 2021-01-01 RX ADMIN — PANTOPRAZOLE SODIUM 40 MG: 40 TABLET, DELAYED RELEASE ORAL at 09:19

## 2021-01-01 RX ADMIN — FUROSEMIDE 100 MG: 80 TABLET ORAL at 08:26

## 2021-01-01 RX ADMIN — ESCITALOPRAM OXALATE 10 MG: 10 TABLET ORAL at 22:44

## 2021-01-01 RX ADMIN — Medication 1 TABLET: at 10:17

## 2021-01-01 RX ADMIN — RAMELTEON 8 MG: 8 TABLET ORAL at 20:37

## 2021-01-01 RX ADMIN — ISOSORBIDE DINITRATE 40 MG: 10 TABLET ORAL at 13:18

## 2021-01-01 RX ADMIN — FUROSEMIDE 100 MG: 10 INJECTION, SOLUTION INTRAMUSCULAR; INTRAVENOUS at 05:58

## 2021-01-01 RX ADMIN — COLCHICINE 0.6 MG: 0.6 TABLET, FILM COATED ORAL at 09:47

## 2021-01-01 RX ADMIN — RAMELTEON 8 MG: 8 TABLET ORAL at 19:59

## 2021-01-01 RX ADMIN — ISOSORBIDE DINITRATE 40 MG: 10 TABLET ORAL at 21:01

## 2021-01-01 RX ADMIN — MILRINONE LACTATE IN DEXTROSE 0.38 MCG/KG/MIN: 200 INJECTION, SOLUTION INTRAVENOUS at 13:23

## 2021-01-01 RX ADMIN — Medication 1 TABLET: at 15:17

## 2021-01-01 RX ADMIN — ALUMINUM HYDROXIDE, MAGNESIUM HYDROXIDE, AND SIMETHICONE 30 ML: 200; 200; 20 SUSPENSION ORAL at 05:44

## 2021-01-01 RX ADMIN — ALLOPURINOL 300 MG: 300 TABLET ORAL at 08:07

## 2021-01-01 RX ADMIN — COLCHICINE 0.6 MG: 0.6 TABLET, FILM COATED ORAL at 09:09

## 2021-01-01 RX ADMIN — ALLOPURINOL 300 MG: 300 TABLET ORAL at 08:38

## 2021-01-01 RX ADMIN — Medication 1 TABLET: at 09:08

## 2021-01-01 RX ADMIN — MILRINONE LACTATE IN DEXTROSE 0.38 MCG/KG/MIN: 200 INJECTION, SOLUTION INTRAVENOUS at 23:33

## 2021-01-01 RX ADMIN — IRON SUCROSE 200 MG: 20 INJECTION, SOLUTION INTRAVENOUS at 11:26

## 2021-01-01 RX ADMIN — FUROSEMIDE 20 MG/HR: 10 INJECTION, SOLUTION INTRAVENOUS at 02:03

## 2021-01-01 RX ADMIN — SODIUM NITROPRUSSIDE 0.25 MCG/KG/MIN: 25 INJECTION, SOLUTION, CONCENTRATE INTRAVENOUS at 02:28

## 2021-01-01 RX ADMIN — PANTOPRAZOLE SODIUM 40 MG: 40 TABLET, DELAYED RELEASE ORAL at 09:07

## 2021-01-01 RX ADMIN — Medication 1 HALF-TAB: at 21:05

## 2021-01-01 RX ADMIN — MILRINONE LACTATE IN DEXTROSE 0.38 MCG/KG/MIN: 200 INJECTION, SOLUTION INTRAVENOUS at 08:59

## 2021-01-01 RX ADMIN — ESCITALOPRAM OXALATE 10 MG: 10 TABLET ORAL at 08:45

## 2021-01-01 RX ADMIN — MILRINONE LACTATE IN DEXTROSE 0.5 MCG/KG/MIN: 200 INJECTION, SOLUTION INTRAVENOUS at 17:47

## 2021-01-01 RX ADMIN — COLCHICINE 0.6 MG: 0.6 TABLET, FILM COATED ORAL at 08:37

## 2021-01-01 RX ADMIN — ESCITALOPRAM OXALATE 10 MG: 10 TABLET ORAL at 21:32

## 2021-01-01 RX ADMIN — SACUBITRIL AND VALSARTAN 1 TABLET: 24; 26 TABLET, FILM COATED ORAL at 09:25

## 2021-01-01 RX ADMIN — ESCITALOPRAM OXALATE 10 MG: 10 TABLET ORAL at 21:34

## 2021-01-01 RX ADMIN — POTASSIUM CHLORIDE 40 MEQ: 750 TABLET, EXTENDED RELEASE ORAL at 20:05

## 2021-01-01 RX ADMIN — MILRINONE LACTATE IN DEXTROSE 0.38 MCG/KG/MIN: 200 INJECTION, SOLUTION INTRAVENOUS at 13:45

## 2021-01-01 RX ADMIN — MILRINONE LACTATE IN DEXTROSE 0.38 MCG/KG/MIN: 200 INJECTION, SOLUTION INTRAVENOUS at 09:02

## 2021-01-01 RX ADMIN — POTASSIUM CHLORIDE 20 MEQ: 29.8 INJECTION, SOLUTION INTRAVENOUS at 06:54

## 2021-01-01 RX ADMIN — MILRINONE LACTATE IN DEXTROSE 0.5 MCG/KG/MIN: 200 INJECTION, SOLUTION INTRAVENOUS at 08:17

## 2021-01-01 RX ADMIN — HYDRALAZINE HYDROCHLORIDE 50 MG: 50 TABLET, FILM COATED ORAL at 22:05

## 2021-01-01 RX ADMIN — FUROSEMIDE 80 MG: 10 INJECTION, SOLUTION INTRAMUSCULAR; INTRAVENOUS at 08:21

## 2021-01-01 RX ADMIN — ESCITALOPRAM OXALATE 10 MG: 10 TABLET ORAL at 22:03

## 2021-01-01 RX ADMIN — POTASSIUM CHLORIDE 40 MEQ: 750 TABLET, EXTENDED RELEASE ORAL at 18:26

## 2021-01-01 RX ADMIN — COLCHICINE 0.6 MG: 0.6 TABLET, FILM COATED ORAL at 07:39

## 2021-01-01 RX ADMIN — Medication 12.5 MG: at 13:44

## 2021-01-01 RX ADMIN — ACETAMINOPHEN 975 MG: 325 TABLET, FILM COATED ORAL at 11:06

## 2021-01-01 RX ADMIN — SACUBITRIL AND VALSARTAN 1 TABLET: 24; 26 TABLET, FILM COATED ORAL at 19:47

## 2021-01-01 RX ADMIN — ACETAMINOPHEN 975 MG: 325 TABLET, FILM COATED ORAL at 05:24

## 2021-01-01 RX ADMIN — MIDAZOLAM 1 MG: 1 INJECTION INTRAMUSCULAR; INTRAVENOUS at 12:42

## 2021-01-01 RX ADMIN — ISOSORBIDE DINITRATE 20 MG: 10 TABLET ORAL at 14:15

## 2021-01-01 RX ADMIN — POTASSIUM CHLORIDE 20 MEQ: 29.8 INJECTION, SOLUTION INTRAVENOUS at 10:16

## 2021-01-01 RX ADMIN — FUROSEMIDE 40 MG: 10 INJECTION, SOLUTION INTRAVENOUS at 15:11

## 2021-01-01 RX ADMIN — ALLOPURINOL 300 MG: 300 TABLET ORAL at 08:26

## 2021-01-01 RX ADMIN — COLCHICINE 0.6 MG: 0.6 TABLET, FILM COATED ORAL at 10:50

## 2021-01-01 RX ADMIN — MILRINONE LACTATE IN DEXTROSE 0.25 MCG/KG/MIN: 200 INJECTION, SOLUTION INTRAVENOUS at 18:30

## 2021-01-01 RX ADMIN — Medication 400 MG: at 08:30

## 2021-01-01 RX ADMIN — COLCHICINE 0.6 MG: 0.6 TABLET, FILM COATED ORAL at 08:22

## 2021-01-01 RX ADMIN — RAMELTEON 8 MG: 8 TABLET ORAL at 19:47

## 2021-01-01 RX ADMIN — MAGNESIUM SULFATE IN WATER 2 G: 40 INJECTION, SOLUTION INTRAVENOUS at 07:53

## 2021-01-01 RX ADMIN — FUROSEMIDE 80 MG: 80 TABLET ORAL at 15:24

## 2021-01-01 RX ADMIN — MILRINONE LACTATE IN DEXTROSE 0.12 MCG/KG/MIN: 200 INJECTION, SOLUTION INTRAVENOUS at 12:45

## 2021-01-01 RX ADMIN — ISOSORBIDE DINITRATE 10 MG: 10 TABLET ORAL at 17:55

## 2021-01-01 RX ADMIN — Medication 400 MG: at 08:46

## 2021-01-01 RX ADMIN — MILRINONE LACTATE IN DEXTROSE 0.38 MCG/KG/MIN: 200 INJECTION, SOLUTION INTRAVENOUS at 05:24

## 2021-01-01 RX ADMIN — LORAZEPAM 0.25 MG: 2 INJECTION INTRAMUSCULAR; INTRAVENOUS at 02:31

## 2021-01-01 RX ADMIN — POTASSIUM CHLORIDE 40 MEQ: 750 TABLET, EXTENDED RELEASE ORAL at 20:36

## 2021-01-01 RX ADMIN — IRON SUCROSE 200 MG: 20 INJECTION, SOLUTION INTRAVENOUS at 12:20

## 2021-01-01 RX ADMIN — SODIUM CHLORIDE 500 ML: 9 INJECTION, SOLUTION INTRAVENOUS at 15:20

## 2021-01-01 RX ADMIN — HYDRALAZINE HYDROCHLORIDE 50 MG: 50 TABLET, FILM COATED ORAL at 13:28

## 2021-01-01 RX ADMIN — ALLOPURINOL 300 MG: 300 TABLET ORAL at 07:45

## 2021-01-01 RX ADMIN — FUROSEMIDE 40 MG: 40 TABLET ORAL at 19:52

## 2021-01-01 RX ADMIN — FUROSEMIDE 80 MG: 10 INJECTION, SOLUTION INTRAMUSCULAR; INTRAVENOUS at 23:52

## 2021-01-01 RX ADMIN — ALLOPURINOL 100 MG: 100 TABLET ORAL at 09:46

## 2021-01-01 RX ADMIN — ACETAMINOPHEN 975 MG: 325 TABLET, FILM COATED ORAL at 21:03

## 2021-01-01 RX ADMIN — IRON SUCROSE 200 MG: 20 INJECTION, SOLUTION INTRAVENOUS at 11:18

## 2021-01-01 RX ADMIN — PANTOPRAZOLE SODIUM 40 MG: 40 INJECTION, POWDER, FOR SOLUTION INTRAVENOUS at 19:35

## 2021-01-01 RX ADMIN — DOBUTAMINE IN DEXTROSE 2.5 MCG/KG/MIN: 200 INJECTION, SOLUTION INTRAVENOUS at 20:15

## 2021-01-01 RX ADMIN — DOBUTAMINE IN DEXTROSE 7.5 MCG/KG/MIN: 200 INJECTION, SOLUTION INTRAVENOUS at 04:37

## 2021-01-01 RX ADMIN — ALLOPURINOL 300 MG: 300 TABLET ORAL at 08:48

## 2021-01-01 RX ADMIN — PANTOPRAZOLE SODIUM 40 MG: 40 INJECTION, POWDER, FOR SOLUTION INTRAVENOUS at 07:39

## 2021-01-01 RX ADMIN — MILRINONE LACTATE IN DEXTROSE 0.25 MCG/KG/MIN: 200 INJECTION, SOLUTION INTRAVENOUS at 04:15

## 2021-01-01 RX ADMIN — FUROSEMIDE 80 MG: 80 TABLET ORAL at 16:29

## 2021-01-01 RX ADMIN — ACETAMINOPHEN 975 MG: 325 TABLET, FILM COATED ORAL at 21:34

## 2021-01-01 RX ADMIN — POTASSIUM CHLORIDE 40 MEQ: 750 TABLET, EXTENDED RELEASE ORAL at 08:07

## 2021-01-01 RX ADMIN — POTASSIUM CHLORIDE 20 MEQ: 1500 TABLET, EXTENDED RELEASE ORAL at 23:10

## 2021-01-01 RX ADMIN — ALLOPURINOL 100 MG: 100 TABLET ORAL at 08:45

## 2021-01-01 RX ADMIN — SACUBITRIL AND VALSARTAN 1 TABLET: 24; 26 TABLET, FILM COATED ORAL at 07:46

## 2021-01-01 RX ADMIN — POTASSIUM CHLORIDE 20 MEQ: 750 TABLET, EXTENDED RELEASE ORAL at 09:24

## 2021-01-01 RX ADMIN — FUROSEMIDE 80 MG: 10 INJECTION, SOLUTION INTRAVENOUS at 08:50

## 2021-01-01 RX ADMIN — POTASSIUM CHLORIDE 20 MEQ: 750 TABLET, EXTENDED RELEASE ORAL at 08:20

## 2021-01-01 RX ADMIN — MILRINONE LACTATE IN DEXTROSE 0.5 MCG/KG/MIN: 200 INJECTION, SOLUTION INTRAVENOUS at 21:07

## 2021-01-01 RX ADMIN — POTASSIUM CHLORIDE 40 MEQ: 750 TABLET, EXTENDED RELEASE ORAL at 08:23

## 2021-01-01 RX ADMIN — POTASSIUM CHLORIDE 20 MEQ: 29.8 INJECTION, SOLUTION INTRAVENOUS at 14:58

## 2021-01-01 RX ADMIN — FUROSEMIDE 80 MG: 80 TABLET ORAL at 16:12

## 2021-01-01 RX ADMIN — ACETAMINOPHEN 975 MG: 325 TABLET, FILM COATED ORAL at 14:14

## 2021-01-01 RX ADMIN — POTASSIUM CHLORIDE 20 MEQ: 750 TABLET, EXTENDED RELEASE ORAL at 21:54

## 2021-01-01 RX ADMIN — POTASSIUM CHLORIDE 20 MEQ: 750 TABLET, EXTENDED RELEASE ORAL at 06:04

## 2021-01-01 RX ADMIN — ESCITALOPRAM OXALATE 10 MG: 10 TABLET ORAL at 09:46

## 2021-01-01 RX ADMIN — TORSEMIDE 60 MG: 20 TABLET ORAL at 16:36

## 2021-01-01 RX ADMIN — IRON SUCROSE 200 MG: 20 INJECTION, SOLUTION INTRAVENOUS at 12:15

## 2021-01-01 RX ADMIN — Medication 50 MG: at 20:03

## 2021-01-01 RX ADMIN — COLCHICINE 0.6 MG: 0.6 TABLET, FILM COATED ORAL at 08:26

## 2021-01-01 RX ADMIN — POTASSIUM CHLORIDE 40 MEQ: 750 TABLET, EXTENDED RELEASE ORAL at 19:59

## 2021-01-01 RX ADMIN — ALLOPURINOL 100 MG: 100 TABLET ORAL at 09:19

## 2021-01-01 RX ADMIN — HYDROXYZINE HYDROCHLORIDE 25 MG: 25 TABLET, FILM COATED ORAL at 22:18

## 2021-01-01 RX ADMIN — POTASSIUM CHLORIDE 40 MEQ: 750 TABLET, EXTENDED RELEASE ORAL at 19:52

## 2021-01-01 RX ADMIN — HYDRALAZINE HYDROCHLORIDE 25 MG: 25 TABLET, FILM COATED ORAL at 21:56

## 2021-01-01 RX ADMIN — MILRINONE LACTATE IN DEXTROSE 0.5 MCG/KG/MIN: 200 INJECTION, SOLUTION INTRAVENOUS at 06:07

## 2021-01-01 RX ADMIN — POTASSIUM CHLORIDE 40 MEQ: 750 TABLET, EXTENDED RELEASE ORAL at 17:56

## 2021-01-01 RX ADMIN — HYDRALAZINE HYDROCHLORIDE 75 MG: 50 TABLET, FILM COATED ORAL at 13:46

## 2021-01-01 RX ADMIN — RAMELTEON 8 MG: 8 TABLET ORAL at 20:41

## 2021-01-01 RX ADMIN — PANTOPRAZOLE SODIUM 40 MG: 40 TABLET, DELAYED RELEASE ORAL at 10:17

## 2021-01-01 RX ADMIN — POTASSIUM CHLORIDE 40 MEQ: 750 TABLET, EXTENDED RELEASE ORAL at 19:47

## 2021-01-01 RX ADMIN — Medication 1 TABLET: at 09:19

## 2021-01-01 RX ADMIN — PANTOPRAZOLE SODIUM 40 MG: 40 TABLET, DELAYED RELEASE ORAL at 09:46

## 2021-01-01 RX ADMIN — POTASSIUM CHLORIDE 20 MEQ: 750 TABLET, EXTENDED RELEASE ORAL at 10:17

## 2021-01-01 RX ADMIN — HYDRALAZINE HYDROCHLORIDE 75 MG: 50 TABLET, FILM COATED ORAL at 05:26

## 2021-01-01 RX ADMIN — SACUBITRIL AND VALSARTAN 1 TABLET: 24; 26 TABLET, FILM COATED ORAL at 20:05

## 2021-01-01 RX ADMIN — ISOSORBIDE DINITRATE 20 MG: 10 TABLET ORAL at 09:11

## 2021-01-01 RX ADMIN — POTASSIUM CHLORIDE 20 MEQ: 750 TABLET, EXTENDED RELEASE ORAL at 08:49

## 2021-01-01 RX ADMIN — ESCITALOPRAM OXALATE 10 MG: 10 TABLET ORAL at 22:22

## 2021-01-01 RX ADMIN — PANTOPRAZOLE SODIUM 40 MG: 40 INJECTION, POWDER, FOR SOLUTION INTRAVENOUS at 15:31

## 2021-01-01 RX ADMIN — MILRINONE LACTATE IN DEXTROSE 0.38 MCG/KG/MIN: 200 INJECTION, SOLUTION INTRAVENOUS at 12:22

## 2021-01-01 RX ADMIN — MILRINONE LACTATE IN DEXTROSE 0.25 MCG/KG/MIN: 200 INJECTION, SOLUTION INTRAVENOUS at 13:00

## 2021-01-01 RX ADMIN — FUROSEMIDE 80 MG: 10 INJECTION, SOLUTION INTRAMUSCULAR; INTRAVENOUS at 15:39

## 2021-01-01 RX ADMIN — MILRINONE LACTATE IN DEXTROSE 0.5 MCG/KG/MIN: 200 INJECTION, SOLUTION INTRAVENOUS at 02:05

## 2021-01-01 RX ADMIN — HYDROMORPHONE HYDROCHLORIDE 0.2 MG: 0.2 INJECTION, SOLUTION INTRAMUSCULAR; INTRAVENOUS; SUBCUTANEOUS at 14:30

## 2021-01-01 RX ADMIN — TORSEMIDE 50 MG: 20 TABLET ORAL at 15:32

## 2021-01-01 RX ADMIN — HYDROMORPHONE HYDROCHLORIDE 0.2 MG: 0.2 INJECTION, SOLUTION INTRAMUSCULAR; INTRAVENOUS; SUBCUTANEOUS at 16:04

## 2021-01-01 RX ADMIN — ALUMINUM HYDROXIDE, MAGNESIUM HYDROXIDE, AND SIMETHICONE 30 ML: 200; 200; 20 SUSPENSION ORAL at 01:30

## 2021-01-01 RX ADMIN — LORAZEPAM 0.5 MG: 2 INJECTION INTRAMUSCULAR; INTRAVENOUS at 02:57

## 2021-01-01 RX ADMIN — FUROSEMIDE 10 MG/HR: 10 INJECTION, SOLUTION INTRAVENOUS at 08:57

## 2021-01-01 RX ADMIN — MIDAZOLAM 2 MG: 1 INJECTION INTRAMUSCULAR; INTRAVENOUS at 12:09

## 2021-01-01 RX ADMIN — Medication 1 TABLET: at 08:27

## 2021-01-01 RX ADMIN — POTASSIUM CHLORIDE 40 MEQ: 750 TABLET, EXTENDED RELEASE ORAL at 00:27

## 2021-01-01 RX ADMIN — ACETAMINOPHEN 975 MG: 325 TABLET, FILM COATED ORAL at 14:27

## 2021-01-01 RX ADMIN — Medication 1 TABLET: at 08:20

## 2021-01-01 RX ADMIN — HYDRALAZINE HYDROCHLORIDE 75 MG: 50 TABLET, FILM COATED ORAL at 05:24

## 2021-01-01 RX ADMIN — ACETAMINOPHEN 975 MG: 325 TABLET, FILM COATED ORAL at 13:46

## 2021-01-01 RX ADMIN — HYDRALAZINE HYDROCHLORIDE 75 MG: 50 TABLET, FILM COATED ORAL at 13:18

## 2021-01-01 RX ADMIN — TORSEMIDE 50 MG: 20 TABLET ORAL at 16:42

## 2021-01-01 RX ADMIN — FUROSEMIDE 60 MG: 10 INJECTION, SOLUTION INTRAVENOUS at 09:48

## 2021-01-01 RX ADMIN — ESCITALOPRAM OXALATE 10 MG: 10 TABLET ORAL at 21:05

## 2021-01-01 RX ADMIN — HYDRALAZINE HYDROCHLORIDE 50 MG: 50 TABLET, FILM COATED ORAL at 21:27

## 2021-01-01 RX ADMIN — COLCHICINE 0.6 MG: 0.6 TABLET, FILM COATED ORAL at 08:50

## 2021-01-01 RX ADMIN — POLYETHYLENE GLYCOL 3350, SODIUM SULFATE ANHYDROUS, SODIUM BICARBONATE, SODIUM CHLORIDE, POTASSIUM CHLORIDE 4000 ML: 236; 22.74; 6.74; 5.86; 2.97 POWDER, FOR SOLUTION ORAL at 16:43

## 2021-01-01 RX ADMIN — Medication 3 MG: at 22:18

## 2021-01-01 RX ADMIN — ACETAMINOPHEN 975 MG: 325 TABLET, FILM COATED ORAL at 05:58

## 2021-01-01 RX ADMIN — FUROSEMIDE 60 MG: 10 INJECTION, SOLUTION INTRAVENOUS at 20:08

## 2021-01-01 RX ADMIN — POTASSIUM CHLORIDE 40 MEQ: 750 TABLET, EXTENDED RELEASE ORAL at 17:55

## 2021-01-01 RX ADMIN — ACETAMINOPHEN 975 MG: 325 TABLET, FILM COATED ORAL at 05:13

## 2021-01-01 RX ADMIN — POTASSIUM CHLORIDE 40 MEQ: 750 TABLET, EXTENDED RELEASE ORAL at 20:08

## 2021-01-01 RX ADMIN — LIDOCAINE HYDROCHLORIDE ANHYDROUS 1 ML: 10 INJECTION, SOLUTION INFILTRATION at 08:38

## 2021-01-01 RX ADMIN — HYDRALAZINE HYDROCHLORIDE 75 MG: 50 TABLET, FILM COATED ORAL at 21:01

## 2021-01-01 RX ADMIN — HYDROMORPHONE HYDROCHLORIDE 0.2 MG: 0.2 INJECTION, SOLUTION INTRAMUSCULAR; INTRAVENOUS; SUBCUTANEOUS at 03:58

## 2021-01-01 RX ADMIN — ISOSORBIDE DINITRATE 20 MG: 10 TABLET ORAL at 22:05

## 2021-01-01 RX ADMIN — ALLOPURINOL 100 MG: 100 TABLET ORAL at 08:27

## 2021-01-01 RX ADMIN — SACUBITRIL AND VALSARTAN 1 TABLET: 24; 26 TABLET, FILM COATED ORAL at 20:08

## 2021-01-01 RX ADMIN — FENTANYL CITRATE 100 MCG: 50 INJECTION, SOLUTION INTRAMUSCULAR; INTRAVENOUS at 12:09

## 2021-01-01 RX ADMIN — POTASSIUM CHLORIDE 40 MEQ: 1.5 POWDER, FOR SOLUTION ORAL at 16:42

## 2021-01-01 RX ADMIN — Medication 50 MG: at 23:20

## 2021-01-01 RX ADMIN — DOBUTAMINE IN DEXTROSE 5 MCG/KG/MIN: 200 INJECTION, SOLUTION INTRAVENOUS at 15:40

## 2021-01-01 RX ADMIN — POTASSIUM CHLORIDE 20 MEQ: 750 TABLET, EXTENDED RELEASE ORAL at 08:07

## 2021-01-01 RX ADMIN — ACETAMINOPHEN 975 MG: 325 TABLET, FILM COATED ORAL at 21:27

## 2021-01-01 RX ADMIN — ISOSORBIDE DINITRATE 20 MG: 10 TABLET ORAL at 01:07

## 2021-01-01 RX ADMIN — POTASSIUM CHLORIDE 20 MEQ: 750 TABLET, EXTENDED RELEASE ORAL at 05:11

## 2021-01-01 RX ADMIN — MILRINONE LACTATE IN DEXTROSE 0.5 MCG/KG/MIN: 200 INJECTION, SOLUTION INTRAVENOUS at 05:52

## 2021-01-01 RX ADMIN — POTASSIUM CHLORIDE 40 MEQ: 750 TABLET, EXTENDED RELEASE ORAL at 09:25

## 2021-01-01 RX ADMIN — MILRINONE LACTATE IN DEXTROSE 0.38 MCG/KG/MIN: 200 INJECTION, SOLUTION INTRAVENOUS at 18:39

## 2021-01-01 RX ADMIN — Medication 12.5 MG: at 08:45

## 2021-01-01 RX ADMIN — MILRINONE LACTATE IN DEXTROSE 0.25 MCG/KG/MIN: 200 INJECTION, SOLUTION INTRAVENOUS at 01:50

## 2021-01-01 RX ADMIN — HYDRALAZINE HYDROCHLORIDE 50 MG: 50 TABLET, FILM COATED ORAL at 06:07

## 2021-01-01 RX ADMIN — POTASSIUM CHLORIDE 20 MEQ: 750 TABLET, EXTENDED RELEASE ORAL at 06:42

## 2021-01-01 RX ADMIN — ALLOPURINOL 100 MG: 100 TABLET ORAL at 09:08

## 2021-01-01 RX ADMIN — ISOSORBIDE DINITRATE 20 MG: 10 TABLET ORAL at 08:51

## 2021-01-01 RX ADMIN — FUROSEMIDE 40 MG: 10 INJECTION, SOLUTION INTRAVENOUS at 16:36

## 2021-01-01 RX ADMIN — ISOSORBIDE DINITRATE 40 MG: 10 TABLET ORAL at 13:46

## 2021-01-01 RX ADMIN — FUROSEMIDE 80 MG: 10 INJECTION, SOLUTION INTRAMUSCULAR; INTRAVENOUS at 16:14

## 2021-01-01 RX ADMIN — MILRINONE LACTATE IN DEXTROSE 0.5 MCG/KG/MIN: 200 INJECTION, SOLUTION INTRAVENOUS at 04:36

## 2021-01-01 RX ADMIN — Medication 12.5 MG: at 21:06

## 2021-01-01 RX ADMIN — ACETAMINOPHEN 975 MG: 325 TABLET, FILM COATED ORAL at 22:05

## 2021-01-01 RX ADMIN — Medication 5.5 ML: at 12:29

## 2021-01-01 RX ADMIN — COLCHICINE 0.6 MG: 0.6 TABLET, FILM COATED ORAL at 08:30

## 2021-01-01 RX ADMIN — RAMELTEON 8 MG: 8 TABLET ORAL at 20:05

## 2021-01-01 RX ADMIN — Medication 1 HALF-TAB: at 08:35

## 2021-01-01 RX ADMIN — ACETAMINOPHEN 975 MG: 325 TABLET, FILM COATED ORAL at 13:18

## 2021-01-01 RX ADMIN — POTASSIUM CHLORIDE 40 MEQ: 750 TABLET, EXTENDED RELEASE ORAL at 08:38

## 2021-01-01 RX ADMIN — ESCITALOPRAM OXALATE 10 MG: 10 TABLET ORAL at 10:17

## 2021-01-01 RX ADMIN — Medication 12.5 MG: at 15:17

## 2021-01-01 RX ADMIN — POTASSIUM CHLORIDE 20 MEQ: 29.8 INJECTION, SOLUTION INTRAVENOUS at 09:22

## 2021-01-01 RX ADMIN — COLCHICINE 0.6 MG: 0.6 TABLET, FILM COATED ORAL at 10:17

## 2021-01-01 RX ADMIN — POTASSIUM CHLORIDE 40 MEQ: 750 TABLET, EXTENDED RELEASE ORAL at 18:15

## 2021-01-01 RX ADMIN — SACUBITRIL AND VALSARTAN 1 TABLET: 24; 26 TABLET, FILM COATED ORAL at 08:30

## 2021-01-01 RX ADMIN — ESCITALOPRAM OXALATE 10 MG: 10 TABLET ORAL at 09:08

## 2021-01-01 RX ADMIN — RAMELTEON 8 MG: 8 TABLET ORAL at 20:07

## 2021-01-01 RX ADMIN — FENTANYL CITRATE 25 MCG: 50 INJECTION, SOLUTION INTRAMUSCULAR; INTRAVENOUS at 12:42

## 2021-01-01 RX ADMIN — FUROSEMIDE 80 MG: 10 INJECTION, SOLUTION INTRAVENOUS at 00:29

## 2021-01-01 RX ADMIN — Medication 1 HALF-TAB: at 12:56

## 2021-01-01 RX ADMIN — HYDROXYZINE HYDROCHLORIDE 25 MG: 25 TABLET, FILM COATED ORAL at 23:20

## 2021-01-01 RX ADMIN — HYDROXYZINE HYDROCHLORIDE 25 MG: 25 TABLET, FILM COATED ORAL at 01:07

## 2021-01-01 RX ADMIN — ISOSORBIDE DINITRATE 40 MG: 10 TABLET ORAL at 05:13

## 2021-01-01 RX ADMIN — SPIRONOLACTONE 25 MG: 25 TABLET, FILM COATED ORAL at 08:27

## 2021-01-01 RX ADMIN — HYDRALAZINE HYDROCHLORIDE 50 MG: 50 TABLET, FILM COATED ORAL at 14:15

## 2021-01-01 RX ADMIN — POTASSIUM CHLORIDE 40 MEQ: 750 TABLET, EXTENDED RELEASE ORAL at 13:44

## 2021-01-01 RX ADMIN — FUROSEMIDE 40 MG: 40 TABLET ORAL at 11:24

## 2021-01-01 RX ADMIN — ALLOPURINOL 100 MG: 100 TABLET ORAL at 08:19

## 2021-01-01 RX ADMIN — MILRINONE LACTATE IN DEXTROSE 0.38 MCG/KG/MIN: 200 INJECTION, SOLUTION INTRAVENOUS at 08:52

## 2021-01-01 RX ADMIN — TORSEMIDE 60 MG: 20 TABLET ORAL at 16:09

## 2021-01-01 RX ADMIN — ISOSORBIDE DINITRATE 40 MG: 10 TABLET ORAL at 05:24

## 2021-01-01 RX ADMIN — HUMAN ALBUMIN MICROSPHERES AND PERFLUTREN 6 ML: 10; .22 INJECTION, SOLUTION INTRAVENOUS at 13:50

## 2021-01-01 RX ADMIN — COLCHICINE 0.6 MG: 0.6 TABLET, FILM COATED ORAL at 09:53

## 2021-01-01 RX ADMIN — POTASSIUM CHLORIDE 20 MEQ: 750 TABLET, EXTENDED RELEASE ORAL at 02:01

## 2021-01-01 RX ADMIN — MILRINONE LACTATE IN DEXTROSE 0.25 MCG/KG/MIN: 200 INJECTION, SOLUTION INTRAVENOUS at 16:09

## 2021-01-01 RX ADMIN — HYDROMORPHONE HYDROCHLORIDE 0.3 MG: 1 INJECTION, SOLUTION INTRAMUSCULAR; INTRAVENOUS; SUBCUTANEOUS at 01:35

## 2021-01-01 RX ADMIN — SODIUM NITROPRUSSIDE 0.25 MCG/KG/MIN: 25 INJECTION, SOLUTION, CONCENTRATE INTRAVENOUS at 07:43

## 2021-01-01 RX ADMIN — ALLOPURINOL 300 MG: 300 TABLET ORAL at 09:47

## 2021-01-01 RX ADMIN — ALLOPURINOL 100 MG: 100 TABLET ORAL at 07:39

## 2021-01-01 RX ADMIN — ALLOPURINOL 300 MG: 300 TABLET ORAL at 10:17

## 2021-01-01 RX ADMIN — COLCHICINE 0.6 MG: 0.6 TABLET, FILM COATED ORAL at 08:27

## 2021-01-01 RX ADMIN — MILRINONE LACTATE IN DEXTROSE 0.5 MCG/KG/MIN: 200 INJECTION, SOLUTION INTRAVENOUS at 16:03

## 2021-01-01 RX ADMIN — Medication 1 TABLET: at 09:46

## 2021-01-01 RX ADMIN — ACETAMINOPHEN 975 MG: 325 TABLET, FILM COATED ORAL at 05:52

## 2021-01-01 RX ADMIN — MILRINONE LACTATE IN DEXTROSE 0.5 MCG/KG/MIN: 200 INJECTION, SOLUTION INTRAVENOUS at 13:25

## 2021-01-01 RX ADMIN — POTASSIUM CHLORIDE 20 MEQ: 1500 TABLET, EXTENDED RELEASE ORAL at 08:30

## 2021-01-01 RX ADMIN — PANTOPRAZOLE SODIUM 40 MG: 40 TABLET, DELAYED RELEASE ORAL at 08:27

## 2021-01-01 RX ADMIN — SACUBITRIL AND VALSARTAN 1 TABLET: 24; 26 TABLET, FILM COATED ORAL at 08:08

## 2021-01-01 RX ADMIN — Medication 3 MG: at 02:56

## 2021-01-01 RX ADMIN — POTASSIUM CHLORIDE 40 MEQ: 750 TABLET, EXTENDED RELEASE ORAL at 08:49

## 2021-01-01 RX ADMIN — COLCHICINE 0.6 MG: 0.6 TABLET, FILM COATED ORAL at 07:46

## 2021-01-01 RX ADMIN — FUROSEMIDE 80 MG: 10 INJECTION, SOLUTION INTRAMUSCULAR; INTRAVENOUS at 08:59

## 2021-01-01 RX ADMIN — HYDROMORPHONE HYDROCHLORIDE 0.2 MG: 0.2 INJECTION, SOLUTION INTRAMUSCULAR; INTRAVENOUS; SUBCUTANEOUS at 21:34

## 2021-01-01 RX ADMIN — FUROSEMIDE 40 MG: 40 TABLET ORAL at 09:24

## 2021-01-01 RX ADMIN — FUROSEMIDE 80 MG: 10 INJECTION, SOLUTION INTRAMUSCULAR; INTRAVENOUS at 08:30

## 2021-01-01 RX ADMIN — DOBUTAMINE IN DEXTROSE 5 MCG/KG/MIN: 200 INJECTION, SOLUTION INTRAVENOUS at 03:45

## 2021-01-01 RX ADMIN — DOBUTAMINE IN DEXTROSE 5 MCG/KG/MIN: 200 INJECTION, SOLUTION INTRAVENOUS at 21:39

## 2021-01-01 RX ADMIN — TORSEMIDE 40 MG: 20 TABLET ORAL at 08:26

## 2021-01-01 RX ADMIN — NOREPINEPHRINE BITARTRATE 0.06 MCG/KG/MIN: 0.06 INJECTION, SOLUTION INTRAVENOUS at 22:53

## 2021-01-01 RX ADMIN — HYDRALAZINE HYDROCHLORIDE 50 MG: 50 TABLET, FILM COATED ORAL at 05:57

## 2021-01-01 RX ADMIN — Medication 400 MG: at 09:19

## 2021-01-01 RX ADMIN — ACETAMINOPHEN 975 MG: 325 TABLET, FILM COATED ORAL at 13:41

## 2021-01-01 RX ADMIN — FUROSEMIDE 80 MG: 10 INJECTION, SOLUTION INTRAMUSCULAR; INTRAVENOUS at 12:18

## 2021-01-01 RX ADMIN — POTASSIUM CHLORIDE 40 MEQ: 750 TABLET, EXTENDED RELEASE ORAL at 07:45

## 2021-01-01 RX ADMIN — RAMELTEON 8 MG: 8 TABLET ORAL at 20:12

## 2021-01-01 RX ADMIN — ISOSORBIDE DINITRATE 40 MG: 10 TABLET ORAL at 05:26

## 2021-01-01 RX ADMIN — POTASSIUM CHLORIDE 60 MEQ: 1500 TABLET, EXTENDED RELEASE ORAL at 23:52

## 2021-01-01 RX ADMIN — MILRINONE LACTATE IN DEXTROSE 0.25 MCG/KG/MIN: 200 INJECTION, SOLUTION INTRAVENOUS at 20:12

## 2021-01-01 RX ADMIN — POLYETHYLENE GLYCOL 3350, SODIUM SULFATE ANHYDROUS, SODIUM BICARBONATE, SODIUM CHLORIDE, POTASSIUM CHLORIDE 2000 ML: 236; 22.74; 6.74; 5.86; 2.97 POWDER, FOR SOLUTION ORAL at 03:29

## 2021-01-01 RX ADMIN — DOBUTAMINE IN DEXTROSE 5 MCG/KG/MIN: 200 INJECTION, SOLUTION INTRAVENOUS at 15:01

## 2021-01-01 RX ADMIN — Medication 5 MG: at 00:30

## 2021-01-01 RX ADMIN — DOBUTAMINE IN DEXTROSE 7.5 MCG/KG/MIN: 200 INJECTION, SOLUTION INTRAVENOUS at 16:04

## 2021-01-01 RX ADMIN — SACUBITRIL AND VALSARTAN 1 TABLET: 24; 26 TABLET, FILM COATED ORAL at 19:52

## 2021-01-01 RX ADMIN — ESCITALOPRAM OXALATE 10 MG: 10 TABLET ORAL at 09:19

## 2021-01-01 RX ADMIN — HYDROMORPHONE HYDROCHLORIDE 0.2 MG: 0.2 INJECTION, SOLUTION INTRAMUSCULAR; INTRAVENOUS; SUBCUTANEOUS at 18:37

## 2021-01-01 RX ADMIN — HYDROXYZINE HYDROCHLORIDE 50 MG: 25 TABLET, FILM COATED ORAL at 10:18

## 2021-01-01 RX ADMIN — IRON SUCROSE 200 MG: 20 INJECTION, SOLUTION INTRAVENOUS at 12:19

## 2021-01-01 RX ADMIN — COLCHICINE 0.6 MG: 0.6 TABLET, FILM COATED ORAL at 09:19

## 2021-01-01 RX ADMIN — TORSEMIDE 50 MG: 20 TABLET ORAL at 08:48

## 2021-01-01 RX ADMIN — SACUBITRIL AND VALSARTAN 1 TABLET: 24; 26 TABLET, FILM COATED ORAL at 09:47

## 2021-01-01 RX ADMIN — ALLOPURINOL 300 MG: 300 TABLET ORAL at 08:59

## 2021-01-01 RX ADMIN — SACUBITRIL AND VALSARTAN 1 TABLET: 24; 26 TABLET, FILM COATED ORAL at 20:37

## 2021-01-01 RX ADMIN — SACUBITRIL AND VALSARTAN 1 TABLET: 24; 26 TABLET, FILM COATED ORAL at 13:00

## 2021-01-01 RX ADMIN — ALLOPURINOL 100 MG: 100 TABLET ORAL at 10:17

## 2021-01-01 RX ADMIN — FUROSEMIDE 160 MG: 10 INJECTION, SOLUTION INTRAVENOUS at 20:23

## 2021-01-01 RX ADMIN — MILRINONE LACTATE IN DEXTROSE 0.25 MCG/KG/MIN: 200 INJECTION, SOLUTION INTRAVENOUS at 08:45

## 2021-01-01 RX ADMIN — ESCITALOPRAM OXALATE 10 MG: 10 TABLET ORAL at 08:27

## 2021-01-01 RX ADMIN — Medication 0.06 MCG/KG/MIN: at 22:53

## 2021-01-01 RX ADMIN — ALLOPURINOL 300 MG: 300 TABLET ORAL at 09:25

## 2021-01-01 RX ADMIN — SACUBITRIL AND VALSARTAN 1 TABLET: 24; 26 TABLET, FILM COATED ORAL at 09:00

## 2021-01-01 RX ADMIN — HUMAN ALBUMIN MICROSPHERES AND PERFLUTREN 5 ML: 10; .22 INJECTION, SOLUTION INTRAVENOUS at 07:17

## 2021-01-01 RX ADMIN — ESCITALOPRAM OXALATE 10 MG: 10 TABLET ORAL at 21:06

## 2021-01-01 RX ADMIN — ISOSORBIDE DINITRATE 20 MG: 10 TABLET ORAL at 18:26

## 2021-01-01 RX ADMIN — PANTOPRAZOLE SODIUM 40 MG: 40 TABLET, DELAYED RELEASE ORAL at 08:20

## 2021-01-01 RX ADMIN — ISOSORBIDE DINITRATE 20 MG: 10 TABLET ORAL at 14:27

## 2021-01-01 RX ADMIN — DOBUTAMINE IN DEXTROSE 5 MCG/KG/MIN: 200 INJECTION, SOLUTION INTRAVENOUS at 06:52

## 2021-01-01 RX ADMIN — POTASSIUM CHLORIDE 20 MEQ: 29.8 INJECTION, SOLUTION INTRAVENOUS at 18:11

## 2021-01-01 RX ADMIN — MILRINONE LACTATE IN DEXTROSE 0.38 MCG/KG/MIN: 200 INJECTION, SOLUTION INTRAVENOUS at 23:18

## 2021-01-01 RX ADMIN — COLCHICINE 0.6 MG: 0.6 TABLET, FILM COATED ORAL at 08:08

## 2021-01-01 RX ADMIN — Medication 12.5 MG: at 12:20

## 2021-01-01 RX ADMIN — DOBUTAMINE IN DEXTROSE 5 MCG/KG/MIN: 200 INJECTION, SOLUTION INTRAVENOUS at 09:46

## 2021-01-01 RX ADMIN — ESCITALOPRAM OXALATE 10 MG: 10 TABLET ORAL at 22:18

## 2021-01-01 RX ADMIN — POTASSIUM CHLORIDE 20 MEQ: 750 TABLET, EXTENDED RELEASE ORAL at 04:31

## 2021-01-01 RX ADMIN — COLCHICINE 0.6 MG: 0.6 TABLET, FILM COATED ORAL at 08:59

## 2021-01-01 RX ADMIN — DOBUTAMINE IN DEXTROSE 5 MCG/KG/MIN: 200 INJECTION, SOLUTION INTRAVENOUS at 03:00

## 2021-01-01 RX ADMIN — SACUBITRIL AND VALSARTAN 1 TABLET: 24; 26 TABLET, FILM COATED ORAL at 20:07

## 2021-01-01 RX ADMIN — ACETAMINOPHEN 975 MG: 325 TABLET, FILM COATED ORAL at 21:54

## 2021-01-01 RX ADMIN — HYDRALAZINE HYDROCHLORIDE 50 MG: 50 TABLET, FILM COATED ORAL at 14:27

## 2021-01-01 RX ADMIN — POTASSIUM CHLORIDE 40 MEQ: 750 TABLET, EXTENDED RELEASE ORAL at 09:47

## 2021-01-01 RX ADMIN — MILRINONE LACTATE IN DEXTROSE 0.38 MCG/KG/MIN: 200 INJECTION, SOLUTION INTRAVENOUS at 23:42

## 2021-01-01 RX ADMIN — POTASSIUM CHLORIDE 40 MEQ: 750 TABLET, EXTENDED RELEASE ORAL at 20:12

## 2021-01-01 RX ADMIN — POTASSIUM CHLORIDE 40 MEQ: 750 TABLET, EXTENDED RELEASE ORAL at 08:30

## 2021-01-01 RX ADMIN — ESCITALOPRAM OXALATE 10 MG: 10 TABLET ORAL at 08:20

## 2021-01-01 RX ADMIN — MILRINONE LACTATE IN DEXTROSE 0.25 MCG/KG/MIN: 200 INJECTION, SOLUTION INTRAVENOUS at 08:46

## 2021-01-01 RX ADMIN — ALLOPURINOL 300 MG: 300 TABLET ORAL at 08:30

## 2021-01-01 RX ADMIN — FUROSEMIDE 80 MG: 10 INJECTION, SOLUTION INTRAVENOUS at 08:44

## 2021-01-01 RX ADMIN — SPIRONOLACTONE 25 MG: 25 TABLET, FILM COATED ORAL at 09:53

## 2021-01-01 RX ADMIN — COLCHICINE 0.6 MG: 0.6 TABLET, FILM COATED ORAL at 08:47

## 2021-01-01 RX ADMIN — MILRINONE LACTATE IN DEXTROSE 0.5 MCG/KG/MIN: 200 INJECTION, SOLUTION INTRAVENOUS at 16:05

## 2021-01-01 RX ADMIN — FUROSEMIDE 80 MG: 80 TABLET ORAL at 08:38

## 2021-01-01 ASSESSMENT — ACTIVITIES OF DAILY LIVING (ADL)
ADLS_ACUITY_SCORE: 5
ADLS_ACUITY_SCORE: 9
ADLS_ACUITY_SCORE: 12
FALL_HISTORY_WITHIN_LAST_SIX_MONTHS: NO
ADLS_ACUITY_SCORE: 5
DRESSING/BATHING_DIFFICULTY: NO
ADLS_ACUITY_SCORE: 6
ADLS_ACUITY_SCORE: 5
ADLS_ACUITY_SCORE: 7
DIFFICULTY_EATING/SWALLOWING: NO
ADLS_ACUITY_SCORE: 7
ADLS_ACUITY_SCORE: 5
DIFFICULTY_EATING/SWALLOWING: NO
ADLS_ACUITY_SCORE: 5
ADLS_ACUITY_SCORE: 11
ADLS_ACUITY_SCORE: 5
ADLS_ACUITY_SCORE: 4
CONCENTRATING,_REMEMBERING_OR_MAKING_DECISIONS_DIFFICULTY: NO
ADLS_ACUITY_SCORE: 9
ADLS_ACUITY_SCORE: 5
ADLS_ACUITY_SCORE: 9
ADLS_ACUITY_SCORE: 6
ADLS_ACUITY_SCORE: 9
ADLS_ACUITY_SCORE: 5
ADLS_ACUITY_SCORE: 9
ADLS_ACUITY_SCORE: 5
ADLS_ACUITY_SCORE: 5
ADLS_ACUITY_SCORE: 7
ADLS_ACUITY_SCORE: 4
ADLS_ACUITY_SCORE: 8
ADLS_ACUITY_SCORE: 10
ADLS_ACUITY_SCORE: 7
ADLS_ACUITY_SCORE: 9
ADLS_ACUITY_SCORE: 6
ADLS_ACUITY_SCORE: 6
ADLS_ACUITY_SCORE: 7
ADLS_ACUITY_SCORE: 7
ADLS_ACUITY_SCORE: 5
ADLS_ACUITY_SCORE: 7
ADLS_ACUITY_SCORE: 6
ADLS_ACUITY_SCORE: 6
ADLS_ACUITY_SCORE: 11
ADLS_ACUITY_SCORE: 5
ADLS_ACUITY_SCORE: 10
ADLS_ACUITY_SCORE: 6
ADLS_ACUITY_SCORE: 5
ADLS_ACUITY_SCORE: 4
ADLS_ACUITY_SCORE: 9
ADLS_ACUITY_SCORE: 5
ADLS_ACUITY_SCORE: 6
ADLS_ACUITY_SCORE: 5
ADLS_ACUITY_SCORE: 5
VISION_MANAGEMENT: GLASSES
ADLS_ACUITY_SCORE: 6
ADLS_ACUITY_SCORE: 6
ADLS_ACUITY_SCORE: 5
ADLS_ACUITY_SCORE: 6
ADLS_ACUITY_SCORE: 8
ADLS_ACUITY_SCORE: 7
ADLS_ACUITY_SCORE: 5
ADLS_ACUITY_SCORE: 7
ADLS_ACUITY_SCORE: 5
ADLS_ACUITY_SCORE: 4
ADLS_ACUITY_SCORE: 5
TOILETING_ISSUES: NO
ADLS_ACUITY_SCORE: 5
WALKING_OR_CLIMBING_STAIRS_DIFFICULTY: NO
ADLS_ACUITY_SCORE: 4
ADLS_ACUITY_SCORE: 7
ADLS_ACUITY_SCORE: 9
ADLS_ACUITY_SCORE: 5
ADLS_ACUITY_SCORE: 5
ADLS_ACUITY_SCORE: 7
ADLS_ACUITY_SCORE: 4
ADLS_ACUITY_SCORE: 5
ADLS_ACUITY_SCORE: 5
ADLS_ACUITY_SCORE: 9
ADLS_ACUITY_SCORE: 5
ADLS_ACUITY_SCORE: 7
ADLS_ACUITY_SCORE: 11
ADLS_ACUITY_SCORE: 6
ADLS_ACUITY_SCORE: 7
ADLS_ACUITY_SCORE: 5
ADLS_ACUITY_SCORE: 5
ADLS_ACUITY_SCORE: 7
ADLS_ACUITY_SCORE: 5
ADLS_ACUITY_SCORE: 7
ADLS_ACUITY_SCORE: 11
ADLS_ACUITY_SCORE: 5
ADLS_ACUITY_SCORE: 5
ADLS_ACUITY_SCORE: 7
ADLS_ACUITY_SCORE: 5
ADLS_ACUITY_SCORE: 6
ADLS_ACUITY_SCORE: 5
ADLS_ACUITY_SCORE: 12
ADLS_ACUITY_SCORE: 5
ADLS_ACUITY_SCORE: 6
ADLS_ACUITY_SCORE: 7
ADLS_ACUITY_SCORE: 5
ADLS_ACUITY_SCORE: 9
ADLS_ACUITY_SCORE: 5
ADLS_ACUITY_SCORE: 5
ADLS_ACUITY_SCORE: 7
ADLS_ACUITY_SCORE: 11
FALL_HISTORY_WITHIN_LAST_SIX_MONTHS: NO
ADLS_ACUITY_SCORE: 7
ADLS_ACUITY_SCORE: 9
ADLS_ACUITY_SCORE: 7
ADLS_ACUITY_SCORE: 11
ADLS_ACUITY_SCORE: 4
ADLS_ACUITY_SCORE: 5
ADLS_ACUITY_SCORE: 7
ADLS_ACUITY_SCORE: 5
ADLS_ACUITY_SCORE: 4
ADLS_ACUITY_SCORE: 5
ADLS_ACUITY_SCORE: 5
ADLS_ACUITY_SCORE: 7
ADLS_ACUITY_SCORE: 5
ADLS_ACUITY_SCORE: 9
ADLS_ACUITY_SCORE: 5
ADLS_ACUITY_SCORE: 9
ADLS_ACUITY_SCORE: 7
ADLS_ACUITY_SCORE: 7
ADLS_ACUITY_SCORE: 5
ADLS_ACUITY_SCORE: 5
ADLS_ACUITY_SCORE: 7
ADLS_ACUITY_SCORE: 7
ADLS_ACUITY_SCORE: 4
ADLS_ACUITY_SCORE: 7
ADLS_ACUITY_SCORE: 9
ADLS_ACUITY_SCORE: 7
ADLS_ACUITY_SCORE: 9
PREVIOUS_RESPONSIBILITIES: MEAL PREP;HOUSEKEEPING;LAUNDRY;SHOPPING;MEDICATION MANAGEMENT;FINANCES
DIFFICULTY_COMMUNICATING: NO
ADLS_ACUITY_SCORE: 4
ADLS_ACUITY_SCORE: 6
ADLS_ACUITY_SCORE: 9
ADLS_ACUITY_SCORE: 5
ADLS_ACUITY_SCORE: 7
HEARING_DIFFICULTY_OR_DEAF: NO
ADLS_ACUITY_SCORE: 5
ADLS_ACUITY_SCORE: 5
ADLS_ACUITY_SCORE: 6
ADLS_ACUITY_SCORE: 7
ADLS_ACUITY_SCORE: 7
ADLS_ACUITY_SCORE: 5
ADLS_ACUITY_SCORE: 5
ADLS_ACUITY_SCORE: 9
ADLS_ACUITY_SCORE: 11
ADLS_ACUITY_SCORE: 5
ADLS_ACUITY_SCORE: 9
ADLS_ACUITY_SCORE: 5
ADLS_ACUITY_SCORE: 7
ADLS_ACUITY_SCORE: 5
ADLS_ACUITY_SCORE: 5
ADLS_ACUITY_SCORE: 4
ADLS_ACUITY_SCORE: 5
ADLS_ACUITY_SCORE: 4
ADLS_ACUITY_SCORE: 5
ADLS_ACUITY_SCORE: 5
ADLS_ACUITY_SCORE: 6
ADLS_ACUITY_SCORE: 6
ADLS_ACUITY_SCORE: 5
ADLS_ACUITY_SCORE: 5
ADLS_ACUITY_SCORE: 4
ADLS_ACUITY_SCORE: 6
ADLS_ACUITY_SCORE: 5
ADLS_ACUITY_SCORE: 7
ADLS_ACUITY_SCORE: 6
ADLS_ACUITY_SCORE: 5
ADLS_ACUITY_SCORE: 10
ADLS_ACUITY_SCORE: 7
ADLS_ACUITY_SCORE: 5
ADLS_ACUITY_SCORE: 6
ADLS_ACUITY_SCORE: 5
ADLS_ACUITY_SCORE: 5
ADLS_ACUITY_SCORE: 11
ADLS_ACUITY_SCORE: 4
ADLS_ACUITY_SCORE: 9
ADLS_ACUITY_SCORE: 5
ADLS_ACUITY_SCORE: 10
ADLS_ACUITY_SCORE: 9
ADLS_ACUITY_SCORE: 5
ADLS_ACUITY_SCORE: 11
ADLS_ACUITY_SCORE: 5
ADLS_ACUITY_SCORE: 7
ADLS_ACUITY_SCORE: 5
DRESSING/BATHING_DIFFICULTY: NO
ADLS_ACUITY_SCORE: 5
ADLS_ACUITY_SCORE: 4
ADLS_ACUITY_SCORE: 7
ADLS_ACUITY_SCORE: 5
ADLS_ACUITY_SCORE: 9
ADLS_ACUITY_SCORE: 9
DOING_ERRANDS_INDEPENDENTLY_DIFFICULTY: NO
ADLS_ACUITY_SCORE: 4
ADLS_ACUITY_SCORE: 5
ADLS_ACUITY_SCORE: 5
ADLS_ACUITY_SCORE: 7
ADLS_ACUITY_SCORE: 6
ADLS_ACUITY_SCORE: 7
ADLS_ACUITY_SCORE: 5
ADLS_ACUITY_SCORE: 4
DIFFICULTY_COMMUNICATING: NO
ADLS_ACUITY_SCORE: 5
ADLS_ACUITY_SCORE: 7
ADLS_ACUITY_SCORE: 9
ADLS_ACUITY_SCORE: 5
ADLS_ACUITY_SCORE: 4
ADLS_ACUITY_SCORE: 5
ADLS_ACUITY_SCORE: 9
ADLS_ACUITY_SCORE: 5
ADLS_ACUITY_SCORE: 7
ADLS_ACUITY_SCORE: 5
ADLS_ACUITY_SCORE: 5
ADLS_ACUITY_SCORE: 4
ADLS_ACUITY_SCORE: 5
ADLS_ACUITY_SCORE: 7
ADLS_ACUITY_SCORE: 5
ADLS_ACUITY_SCORE: 7
ADLS_ACUITY_SCORE: 7
ADLS_ACUITY_SCORE: 4
ADLS_ACUITY_SCORE: 7
ADLS_ACUITY_SCORE: 12
ADLS_ACUITY_SCORE: 5
ADLS_ACUITY_SCORE: 7
ADLS_ACUITY_SCORE: 9
ADLS_ACUITY_SCORE: 5
TOILETING_ISSUES: NO
ADLS_ACUITY_SCORE: 5
ADLS_ACUITY_SCORE: 5
ADLS_ACUITY_SCORE: 7
ADLS_ACUITY_SCORE: 5
ADLS_ACUITY_SCORE: 11
ADLS_ACUITY_SCORE: 9
ADLS_ACUITY_SCORE: 7
ADLS_ACUITY_SCORE: 5
ADLS_ACUITY_SCORE: 4
ADLS_ACUITY_SCORE: 4
ADLS_ACUITY_SCORE: 5
ADLS_ACUITY_SCORE: 5
ADLS_ACUITY_SCORE: 7
CONCENTRATING,_REMEMBERING_OR_MAKING_DECISIONS_DIFFICULTY: NO
ADLS_ACUITY_SCORE: 7
ADLS_ACUITY_SCORE: 9
ADLS_ACUITY_SCORE: 7
ADLS_ACUITY_SCORE: 5
ADLS_ACUITY_SCORE: 5
ADLS_ACUITY_SCORE: 4
ADLS_ACUITY_SCORE: 5
ADLS_ACUITY_SCORE: 11
ADLS_ACUITY_SCORE: 5
ADLS_ACUITY_SCORE: 9
ADLS_ACUITY_SCORE: 9
ADLS_ACUITY_SCORE: 7
ADLS_ACUITY_SCORE: 5
ADLS_ACUITY_SCORE: 8
ADLS_ACUITY_SCORE: 5
ADLS_ACUITY_SCORE: 6
ADLS_ACUITY_SCORE: 5
ADLS_ACUITY_SCORE: 5
ADLS_ACUITY_SCORE: 4
ADLS_ACUITY_SCORE: 5
ADLS_ACUITY_SCORE: 9
ADLS_ACUITY_SCORE: 4
ADLS_ACUITY_SCORE: 9
WHICH_OF_THE_ABOVE_FUNCTIONAL_RISKS_HAD_A_RECENT_ONSET_OR_CHANGE?: AMBULATION
ADLS_ACUITY_SCORE: 7
ADLS_ACUITY_SCORE: 5
ADLS_ACUITY_SCORE: 5
ADLS_ACUITY_SCORE: 9
ADLS_ACUITY_SCORE: 5
ADLS_ACUITY_SCORE: 6
ADLS_ACUITY_SCORE: 5
ADLS_ACUITY_SCORE: 5
ADLS_ACUITY_SCORE: 11
ADLS_ACUITY_SCORE: 7
HEARING_DIFFICULTY_OR_DEAF: NO
ADLS_ACUITY_SCORE: 9
ADLS_ACUITY_SCORE: 7
ADLS_ACUITY_SCORE: 6
ADLS_ACUITY_SCORE: 9
ADLS_ACUITY_SCORE: 5
ADLS_ACUITY_SCORE: 7
ADLS_ACUITY_SCORE: 9
ADLS_ACUITY_SCORE: 9
ADLS_ACUITY_SCORE: 10
ADLS_ACUITY_SCORE: 6
ADLS_ACUITY_SCORE: 5
ADLS_ACUITY_SCORE: 4
ADLS_ACUITY_SCORE: 7
ADLS_ACUITY_SCORE: 6
ADLS_ACUITY_SCORE: 7
ADLS_ACUITY_SCORE: 9
ADLS_ACUITY_SCORE: 5
ADLS_ACUITY_SCORE: 5
ADLS_ACUITY_SCORE: 7
ADLS_ACUITY_SCORE: 5
ADLS_ACUITY_SCORE: 7
ADLS_ACUITY_SCORE: 5
ADLS_ACUITY_SCORE: 7
ADLS_ACUITY_SCORE: 5
ADLS_ACUITY_SCORE: 7
ADLS_ACUITY_SCORE: 5
WEAR_GLASSES_OR_BLIND: YES
ADLS_ACUITY_SCORE: 7
ADLS_ACUITY_SCORE: 5
ADLS_ACUITY_SCORE: 11
ADLS_ACUITY_SCORE: 5
ADLS_ACUITY_SCORE: 9
ADLS_ACUITY_SCORE: 5
ADLS_ACUITY_SCORE: 7
ADLS_ACUITY_SCORE: 5
ADLS_ACUITY_SCORE: 7
ADLS_ACUITY_SCORE: 6
ADLS_ACUITY_SCORE: 5
ADLS_ACUITY_SCORE: 7
ADLS_ACUITY_SCORE: 6
ADLS_ACUITY_SCORE: 9
ADLS_ACUITY_SCORE: 7
PATIENT_/_FAMILY_COMMUNICATION_STYLE: SPOKEN LANGUAGE (ENGLISH OR BILINGUAL)
ADLS_ACUITY_SCORE: 5
ADLS_ACUITY_SCORE: 5
ADLS_ACUITY_SCORE: 9
ADLS_ACUITY_SCORE: 9
ADLS_ACUITY_SCORE: 6
ADLS_ACUITY_SCORE: 6
WALKING_OR_CLIMBING_STAIRS_DIFFICULTY: NO
ADLS_ACUITY_SCORE: 9
ADLS_ACUITY_SCORE: 5
ADLS_ACUITY_SCORE: 11
ADLS_ACUITY_SCORE: 5
ADLS_ACUITY_SCORE: 5
ADLS_ACUITY_SCORE: 7
ADLS_ACUITY_SCORE: 5
ADLS_ACUITY_SCORE: 5
ADLS_ACUITY_SCORE: 6
ADLS_ACUITY_SCORE: 4
ADLS_ACUITY_SCORE: 5
ADLS_ACUITY_SCORE: 9
DEPENDENT_IADLS:: INDEPENDENT
ADLS_ACUITY_SCORE: 5
ADLS_ACUITY_SCORE: 9
ADLS_ACUITY_SCORE: 5
ADLS_ACUITY_SCORE: 6
ADLS_ACUITY_SCORE: 5
ADLS_ACUITY_SCORE: 10
ADLS_ACUITY_SCORE: 5
ADLS_ACUITY_SCORE: 7
ADLS_ACUITY_SCORE: 9
ADLS_ACUITY_SCORE: 6
ADLS_ACUITY_SCORE: 12
ADLS_ACUITY_SCORE: 9
ADLS_ACUITY_SCORE: 11
ADLS_ACUITY_SCORE: 8
ADLS_ACUITY_SCORE: 9
ADLS_ACUITY_SCORE: 9
ADLS_ACUITY_SCORE: 6
ADLS_ACUITY_SCORE: 4
ADLS_ACUITY_SCORE: 5
ADLS_ACUITY_SCORE: 11
ADLS_ACUITY_SCORE: 10
ADLS_ACUITY_SCORE: 5
ADLS_ACUITY_SCORE: 7
ADLS_ACUITY_SCORE: 5
ADLS_ACUITY_SCORE: 4
ADLS_ACUITY_SCORE: 5
ADLS_ACUITY_SCORE: 6
ADLS_ACUITY_SCORE: 7
ADLS_ACUITY_SCORE: 7
ADLS_ACUITY_SCORE: 4
EQUIPMENT_CURRENTLY_USED_AT_HOME: CANE, QUAD
ADLS_ACUITY_SCORE: 10
ADLS_ACUITY_SCORE: 7
VISION_MANAGEMENT: GLASSES ON
ADLS_ACUITY_SCORE: 9
WEAR_GLASSES_OR_BLIND: YES
ADLS_ACUITY_SCORE: 5
ADLS_ACUITY_SCORE: 9
ADLS_ACUITY_SCORE: 5
ADLS_ACUITY_SCORE: 5
ADLS_ACUITY_SCORE: 7
ADLS_ACUITY_SCORE: 11
ADLS_ACUITY_SCORE: 7
ADLS_ACUITY_SCORE: 6
ADLS_ACUITY_SCORE: 5
ADLS_ACUITY_SCORE: 5
ADLS_ACUITY_SCORE: 9
ADLS_ACUITY_SCORE: 5
DOING_ERRANDS_INDEPENDENTLY_DIFFICULTY: NO
ADLS_ACUITY_SCORE: 7
ADLS_ACUITY_SCORE: 5
ADLS_ACUITY_SCORE: 4
ADLS_ACUITY_SCORE: 5
ADLS_ACUITY_SCORE: 9
ADLS_ACUITY_SCORE: 4
ADLS_ACUITY_SCORE: 9
ADLS_ACUITY_SCORE: 9
ADLS_ACUITY_SCORE: 5
ADLS_ACUITY_SCORE: 7
ADLS_ACUITY_SCORE: 5
ADLS_ACUITY_SCORE: 7
ADLS_ACUITY_SCORE: 5
ADLS_ACUITY_SCORE: 9
ADLS_ACUITY_SCORE: 5
ADLS_ACUITY_SCORE: 7
ADLS_ACUITY_SCORE: 5
ADLS_ACUITY_SCORE: 5
ADLS_ACUITY_SCORE: 7
ADLS_ACUITY_SCORE: 5
ADLS_ACUITY_SCORE: 4
ADLS_ACUITY_SCORE: 11
ADLS_ACUITY_SCORE: 11
ADLS_ACUITY_SCORE: 5
ADLS_ACUITY_SCORE: 4
ADLS_ACUITY_SCORE: 9
ADLS_ACUITY_SCORE: 7

## 2021-01-01 ASSESSMENT — PAIN SCALES - GENERAL
PAINLEVEL: NO PAIN (0)

## 2021-01-01 ASSESSMENT — MIFFLIN-ST. JEOR
SCORE: 1779.3
SCORE: 1848.88
SCORE: 1777.88
SCORE: 1727.53
SCORE: 1792.91
SCORE: 1784.74
SCORE: 1826.48
SCORE: 1781.88
SCORE: 1887.26
SCORE: 1794.27
SCORE: 1796.99
SCORE: 1813.77
SCORE: 1792.91

## 2021-01-01 ASSESSMENT — ENCOUNTER SYMPTOMS
COUGH: 1
SHORTNESS OF BREATH: 1
ABDOMINAL PAIN: 1
FATIGUE: 1
NAUSEA: 1
FEVER: 0
SHORTNESS OF BREATH: 1
CHILLS: 0
CHEST TIGHTNESS: 1

## 2021-01-01 ASSESSMENT — PATIENT HEALTH QUESTIONNAIRE - PHQ9
SUM OF ALL RESPONSES TO PHQ QUESTIONS 1-9: 15
SUM OF ALL RESPONSES TO PHQ QUESTIONS 1-9: 15
10. IF YOU CHECKED OFF ANY PROBLEMS, HOW DIFFICULT HAVE THESE PROBLEMS MADE IT FOR YOU TO DO YOUR WORK, TAKE CARE OF THINGS AT HOME, OR GET ALONG WITH OTHER PEOPLE: EXTREMELY DIFFICULT
SUM OF ALL RESPONSES TO PHQ QUESTIONS 1-9: 15

## 2021-11-01 PROBLEM — I95.9 HYPOTENSION: Status: ACTIVE | Noted: 2021-01-01

## 2021-11-01 PROBLEM — I50.23 ACUTE ON CHRONIC SYSTOLIC CONGESTIVE HEART FAILURE (H): Status: ACTIVE | Noted: 2021-01-01

## 2021-11-01 PROBLEM — I42.8 NONISCHEMIC CARDIOMYOPATHY (H): Status: ACTIVE | Noted: 2021-01-01

## 2021-11-01 PROBLEM — E87.1 ACUTE HYPONATREMIA: Status: ACTIVE | Noted: 2021-01-01

## 2021-11-01 PROBLEM — R57.0 CARDIOGENIC SHOCK (H): Status: ACTIVE | Noted: 2021-01-01

## 2021-11-01 PROBLEM — R59.0 MEDIASTINAL ADENOPATHY: Status: ACTIVE | Noted: 2021-01-01

## 2021-11-01 PROBLEM — J01.00 ACUTE NON-RECURRENT MAXILLARY SINUSITIS: Status: ACTIVE | Noted: 2021-01-01

## 2021-11-01 PROBLEM — K92.2 CHRONIC GASTROINTESTINAL BLEEDING: Status: ACTIVE | Noted: 2021-01-01

## 2021-11-01 PROBLEM — I34.0 NONRHEUMATIC MITRAL VALVE REGURGITATION: Status: ACTIVE | Noted: 2021-01-01

## 2021-11-01 PROBLEM — M1A.9XX0 CHRONIC GOUT: Status: ACTIVE | Noted: 2021-01-01

## 2021-11-01 PROBLEM — D50.0 CHRONIC BLOOD LOSS ANEMIA: Status: ACTIVE | Noted: 2021-01-01

## 2021-11-01 PROBLEM — I27.20 PULMONARY HYPERTENSION (H): Status: ACTIVE | Noted: 2021-01-01

## 2021-11-01 NOTE — Clinical Note
Prepped: left neck. Prepped with: ChloraPrep. The patient was draped. .Pre-procedure site marking:Insertion site not predetermined

## 2021-11-01 NOTE — PROGRESS NOTES
S/B: received message from provider to schedule pt. He is currently in [facility] but will be discharging. Referring provider is Benjamin López MD Phone: [phone]; Pager: [phone]; Fax: [phone]. Patient moved  from [place] to [place] due to heart problems and needing to be by a better support system.       A/P:   November 1, 2021 - per Dr. Rigoberto castaneda to schedule 11/2 with him. Appt made.

## 2021-11-02 NOTE — H&P
[facility]    Cardiology History and Physical - Cards 2    Date of Admission:  11/1/2021    Assessment & Plan: HVSL    Kavon Banerjee is a 56 year old male PMH NICM/DCM EF 10% (LVEDD 8.0 cm) who presents to the ER with dizziness and shortness of breath after leaving [facility] earlier today against medical advice after hospitalization for CHF/cardiogenic shock for the last 8 days.    #Cardiogenic shock  #Acute systolic heart failure/nonischemic cardiomyopathy- etiology unknown- EtOH vs meth vs viral vs idiopathic. Cors clean on 10/26/21 cath.  #Hyponatremia  Discharged on Losartan 25mg BID (previously on Entresto 24/26), Bumex and Coreg. Nt-BNP 3891 this evening. Na 126. Lactic 3.8.We will need to discuss if he is a candidate for advanced therapies. He is currently DNR/DNI but he is quite young. Is LVEDD is 8.0 cm and would be favorable for an LVAD although his PVR on cath on 10/26 was 6 IRELAND. He denies EtOH or illicits but we will need to confirm with testing. He is ABO group O.    - Dobutamine 5mcg/kg/min with norepinephrine for MAP goal >65 as needed  - Aggressive diuresis with lasix 80 mg IV x1, will adjust pending response  - strict I/Os, daily weights  - Hold all GDMT (ACEI/ARB/ARNI, BB) given cardiogenic shock  - NPO at [place] for possible RHC in AM  - TTE in AM    #Pulmonary hypertension, severe, with some degree of irreversibility based on PVR.  Would affect his candidacy for transplant/LVAD. (10/26- RA 21 mmHg; PA 67/43, 52 mmHg; PCW mean 28 mmHg)    #PARUL  Likely due to cardiogenic shock and CRS     Non-cardiac issues:  #Acute non-recurrent maxillary sinusitis  - completed 7 days augmentin with improvement at [facility]  #Probable KENDELL  - possible, will refer to sleep study after discharge  #Chronic gout  - stable. On allopurinol, colchicine  #Mediastinal adenopathy, 1.8 cm RUL nodule  - needs repeat CT chest 6-12 months. FU with PCP.  "  #Chronic gastrointestinal bleeding  Chronic blood loss anemia  - needs colonoscopy outpatient (per [facility])     Diet: NPO  DVT Prophylaxis: Pneumatic Compression Devices  [place] Catheter: Not present  Code Status: DNR/DNI. Discuss with patient at length. He was also DNR/DNI at [facility]      The patient's care was discussed with the Attending Physician, Dr. Rivers, Bedside Nurse and Patient.    Christelle Davis MD   Cardiology Fellow  [facility]  Please see sign in/sign out for up to date coverage information      Day team addendum  S: discussed further with patient. Notes feeling that reasonable QoL about 2 months prior, but now with recurrent issues with SOB/DUNN and inability to perform ADLs. States this happened around when he got Pneumovax. Notes good social support system in [place] (parents, with whom he lives). Has been here for about 2-3 weeks at this point.     Social: past history of severe alcohol abuse per patient; still at times orders EtOH that he \"sips\". States will order a beer and shot and sip on it and not finish, but does not use to degree he used to when it was a \"problem\". States past drug use of cocaine, mushrooms, methamphetamine, LSD and other substances. States his last meth use was 1 month ago and \"isn't a problem like alcohol\". States he works on following salt restriction because his mom is really watching him about that. Noted prior history of multiple times where he would run out of medications or not have them on hand for 3-4 days and would get very bad symptoms in the past.     O:   Vital signs:  Temp: 98.1  F (36.7  C) Temp src: Oral BP: 90/70 Pulse: 90   Resp: 20 SpO2: 99 % O2 Device: None (Room air) Oxygen Delivery: 2 LPM Height: 185.4 cm (6' 1\") Weight: 95.5 kg (210 lb 8.6 oz)  Estimated body mass index is 27.78 kg/m  as calculated from the following:    Height as of this encounter: 1.854 m (6' 1\").    Weight as of this encounter: " 95.5 kg (210 lb 8.6 oz).    GENERAL APPEARANCE: well-developed, well-appearing male in NAD, polite and conversational  HEENT: at/nc, eomi, mmm, sclera anicteric, no oral ulcerations, no xanthelasmas   NECK: no adenopathy, thyroid normal to palpation, JVP e;levated to earlobe at 70 degrees  RESPIRATORY: CTAB on RA, nonlabored, no accessory muscle use  CARDIOVASCULAR: rrr, s1, s2, no murmur, rub or gallop, no s3 or s4  GI: soft, slightly distended, nt, bs+  EXTREMITIES: no gross deformity, adequate muscle bulk, 1+ BLE edema  NEURO: alert, interactive, speech fluent, grossly nonfocal, moving all 4  VASC: Radial and DP pulses are normal in volumes and symmetric bilaterally  SKIN: no ecchymoses, no rashes on exposed skin    A/P  Will plan to continue dobutamine as above. Still with evidence of increased volume on board so will continue with IV diuretics. Will plan for likely RHC in coming days to assess volume status/hemodynamics. Given his recent drug use, he is not currently a candidate for advanced therapies but will discuss what these therapies would look like so he is aware of this for the future should he become a candidate.     The patient was seen and discussed with Dr. Rivers, who agrees with the above assessment and plan.      ______________________________________________________________________    Chief Complaint   Cardiogenic shock    History of Present Illness   Kavon Banerjee is a 56 year old male PMH NICM/DCM EF 10% (LVEDD 8.0 cm) who presents to the ER with dizziness and shortness of breath after leaving [facility] earlier today against medical advice after hospitalization for CHF/cardiogenic shock for the last 8 days.    Prior to his admission at [facility] he switched himself back to lasix at which time he developed worsening BLE edema. During that time, he became more sob and weak. He presented to the [facility] ED for further eval. Upon presentation, initial BP was 83/60. Given  hypotension and need for ongoing diuresis started on dobutamine to facilitate diuresis which was very effective. Dobutamine stopped 10/29, however, due to lower BPs after taking lopressor,  was resumed. Dobutamine was stopped AM 11/1 due to patient insisting on DC home, despite provider recommendations otherwise.     Per review of the chart, patient was discharged from [facility] earlier today (admitted from 10/25-11/1) for clinically decompensated heart failure.  He had severely reduced EF to 10-15% from his baseline 20% along with cardiorenal PARUL vs CKD. He had a coronary angiogram and RHC on 10/26 showing severe fluid overload and cardiogenic shock (RA 21 mmHg; PA 67/43, 52 mmHg; PCW mean 28 mmHg, CI 1.1) and normal coronaries.     He was on dobutamine until this morning, but this was obviously discontinued at discharge. He went home and felt poorly with dizziness, shortness of breath at rest and on minimal exertion. His mother noted that he was turning blue and called EMS. Per EMS, lblood pressure reading was 50s/20s, satting 100% on room air. He received 500 cc of fluid in route.      Has lived in [place] and gotten his medical care there until now. Recently, he came to [place], where his parents live, to have his medical problems addressed. Has known nonischemic dilated cardiomyopathy, with EF 20% and moderate MR. In the past he has been managed with carvedilol, entresto, and loop diuretic. Very remote hx meth and cocaine use. Former heavy etoh use, but none in 2 yrs.    Review of Systems    The 10 point Review of Systems is negative other than noted in the HPI or here.     Past Medical History    Acute on chronic systolic congestive heart failure  Nonischemic cardiomyopathy  Pulmonary HTN  Acute non-recurrent maxillary sinusitis   Chronic gout    Past Surgical History   I have reviewed this patient's surgical history and updated it with pertinent information if needed.  History reviewed. No pertinent  surgical history.    Social History   I have reviewed this patient's social history and updated it with pertinent information if needed.  Social History     Tobacco Use     Smoking status: Never Smoker     Smokeless tobacco: Never Used   Substance Use Topics     Alcohol use: Not Currently     Comment: used to be a heavy drinker up until 50     Drug use: None   Very remote hx meth and cocaine use. Former heavy etoh use, but none in 2 yrs.    Family History   I have reviewed this patient's family history and updated it with pertinent information if needed.   No family history of CAD reported    Prior to Admission Medications   Prior to Admission Medications   Prescriptions Last Dose Informant Patient Reported? Taking?   allopurinol (ZYLOPRIM) 100 MG tablet   Yes No   Sig: Take 300 mg by mouth daily   colchicine (COLCYRS) 0.6 MG tablet   Yes No   Sig: Take 0.6 mg by mouth daily   furosemide (LASIX) 40 MG tablet   Yes No   Sig: Take 40 mg by mouth 2 times daily   losartan (COZAAR) 25 MG tablet   Yes No   Sig: Take 25 mg by mouth 2 times daily   polyethylene glycol (MIRALAX) 17 GM/Dose powder   Yes No   Sig: Take 17 g by mouth 2 times daily as needed   senna (SENOKOT) 8.6 MG tablet   Yes No   Sig: Take 1 tablet by mouth 2 times daily      Facility-Administered Medications: None     Allergies   Allergies   Allergen Reactions     Pneumococcal Polysaccharides Other (See Comments)       Physical Exam   Vital Signs: Temp: 98.4  F (36.9  C) Temp src: Oral BP: 105/75 Pulse: 103   Resp: 22 SpO2: 98 % O2 Device: Nasal cannula Oxygen Delivery: 4 LPM  Weight: 210 lbs 0 oz  Physical Exam  Vitals reviewed.   Constitutional:       General: He is in acute distress.      Appearance: He is ill-appearing.   HENT:      Head: Normocephalic and atraumatic.   Neck:      Comments: JVP to 18 cm, tragus  Cardiovascular:      Rate and Rhythm: Normal rate and regular rhythm.      Pulses: Normal pulses.      Heart sounds: Murmur heard.   Systolic  (holosystolic at apex and LLSB ) murmur is present with a grade of 3/6.     Pulmonary:      Effort: No accessory muscle usage.      Breath sounds: Rales (at bases bilaterally) present.   Abdominal:      General: There is distension (firm).      Tenderness: There is no abdominal tenderness.   Musculoskeletal:      Cervical back: Normal range of motion.      Left lower leg: Edema (bilateral 2+) present.   Skin:     General: Skin is dry.      Capillary Refill: Capillary refill takes more than 3 seconds.      Coloration: Skin is pale.      Comments: Cool   Neurological:      General: No focal deficit present.      Mental Status: He is alert and oriented to person, place, and time.         Data   Data reviewed today: I reviewed all medications, new labs and imaging results over the last 24 hours.    Recent Labs   Lab 11/01/21 2256 11/01/21 2252   WBC 10.2  --    HGB 9.9* 11.9*   MCV 74*  --      --    INR 1.44*  --    * 126*   POTASSIUM 4.5 4.6   CHLORIDE 93*  --    CO2 22  --    BUN 21  --    CR 1.47*  --    ANIONGAP 10  --    JESSICA 8.5  --    * 106*   ALBUMIN 2.9*  --    PROTTOTAL 6.2*  --    BILITOTAL 1.4*  --    ALKPHOS 107  --    ALT 32  --    AST 22  --    TROPONIN 0.027  --      Recent Results (from the past 24 hour(s))   XR Chest Port 1 View    Narrative    EXAM: XR CHEST PORT 1 VIEW  LOCATION: [facility]  DATE/TIME: 11/1/2021 10:56 PM    INDICATION: SOB  COMPARISON: None.      Impression    IMPRESSION: Moderate to marked enlargement of the cardiac silhouette. No evidence of congestive heart failure or pneumonia. No visible pneumothorax. There is a 6 cm curvilinear metallic density overlying the right distal clavicle which may be external to   the patient and is indeterminate.     TTE 10/25/2021  # Severe left ventricular dilation is present. LVEDd 8.0 cm. Severe   diffuse hypokinesis is present. Left ventricular ejection fraction is   visually  estimated at 10%. No left ventricular thrombus was seen on   contrast pictures.   # Severe right ventricular dilation is present. Global right   ventricular systolic function is moderately reduced.   # Severe biatrial enlargement.   # at least moderate mitral and tricuspid valvular regurgitation.   # Pulmonary artery systolic pressure estimate is 35mmHg above   RAP(borderline elevated) .   # There is no pericardial effusion.   # Dilation of the inferior vena cava (> 2.1 cm) with abnormal   respiratory variation in diameter. Estimated right atrial pressure is   > 15 mmHg .   No prior study for comparison.     Coronary angiogram/RHC  Conclusions     1. Severe pulmonary hypertension with severely elevated filling pressures   (RA 21 mmHg; PA 67/43, 52 mmHg; PCW mean 28 mmHg).     2. PVR elevated at 6.48 IRELAND suggesting possible that some of PA pressure   elevation not reversible with diuresis. PCW sat 97.4% and correlated to   LVEDP   27 mmHg.     3. Very low cardiac output (Anthony CO 3.34, CI 1.1; TDOC 2.5, CI 1.1).     4. Normal coronary arteries.     Recommendations     * Findings reviewed with referring cardiologist, Dr. Forrester.     * Routine post-angiogram cares.       Diagnostic Findings     * No disease noted in the Left Main, Left Anterior Descending, Right, or   Circumflex coronary arteries.     * Coronary angiography shows right dominance.       CTA chest/abdomen/pelvis OS  10/28/2021    IMPRESSION:     1. No evidence of aortic dissection/acute aortic syndrome.   2. Moderate cardiomegaly.   3. No acute pulmonary process.   4. Mediastinal adenopathy similar to recent chest CT, nonspecific.   5. Mild hazy density within the upper abdominal fat may be on the basis of fluid overload given mild anasarca. There is mild intra-abdominal fluid as well.   6. Gallbladder distention and diffuse wall thickening. Right upper quadrant ultrasound suggested for further evaluation.   7. Mild diffuse bladder wall thickening,  nonspecific. Correlate clinically for infection.   8. The appendix measures upper limits of normal diameter, but without definitive inflammation. Correlate clinically for appendicitis.      I have reviewed today's vital signs, notes, medications, labs and imaging.  I have also seen and examined the patient and agree with the findings and plan as outlined above.  Pt is 57 yo with endstage DCM which is nonischemic in etiology with hx of polysubstance abuse including alcohol and meth (last used one month ago) who has had several hospitalizations for hypervolemia and decompensated heart failure now with hypervolemia, tachycardia and EF 10%.  Agree with inotrope support, iv diuresis and will place RHC once pt is euvolemic.  Pt with guarded prognosis.     Justice Rivers MD, PhD  Professor, Heart Failure and Cardiac Transplantation  [facility]

## 2021-11-02 NOTE — ED TRIAGE NOTES
Pt BIBA from home with shortness of breath and diziness. Pt was hypotensive en route. Last EF around 10. 18 g PIV.

## 2021-11-02 NOTE — PLAN OF CARE
"Major Shift Events:  N: Intact, A/O X 4, equal strength.  CV: Arterial line removed. Levophed infusion discontinued. BP stable, on straight rate dobutamine. Intermittently agitated/unwilling to cooperate with nursing assessments. NSR w/ BBB.  L: LS clear, weaned off O2 on RA, dyspnea on exertion.   GI/: Adequate urine output, IV lasix given X 1.  Patient c/o abdominal pain and that he has been constipated. Requested dilaudid for pain. MD contacted about request. Order placed for PRN stool softener. Patient asked again about dilaudid and writer shared provider's order. Patient stated \"Oh come on. Any time I ask for something I know is going to help me no one will give it to me.\" Patient was offered to try tylenol with stool softener and patient refused this intervention.   Tolerating heart healthy diet with Na restriction and 2L FR.  Patient required education on fluid restriction from writer and MD, stated \"I've never been told I need to restrict my fluid intake\".  Potassium replaced AM and afternoon, recheck scheduled for 2100. Magnesium replaced.  Worked with therapy, ambulated in wen.   Patient's mother updated at bedside.     Plan: Monitor I/O, continue with diuresing. Assess for hypotension. Encourage activity/OOB. Transfer out of ICU potentially 11/3. Monitor/treat electrolyte imbalances.   For vital signs and complete assessments, please see documentation flowsheets.    "

## 2021-11-02 NOTE — PROGRESS NOTES
11/02/21 1500   Quick Adds   Type of Visit Initial Occupational Therapy Evaluation       Present no   Language english   Living Environment   People in home parent(s)  (mother)   Current Living Arrangements other (see comments)  (townhome)   Home Accessibility stairs to enter home;stairs within home   Number of Stairs, Main Entrance 2   Number of Stairs, Within Home, Primary other (see comments)  (one flight)   Transportation Anticipated family or friend will provide   Living Environment Comments [facility] with his mother. Pt has 2 stairs to enter the home and one flight to get to the bedroom/bathroom. Pt has a tub/shower combo in the bathroom   Self-Care   Usual Activity Tolerance good   Current Activity Tolerance moderate   Regular Exercise No   Equipment Currently Used at Home cane, quad   Activity/Exercise/Self-Care Comment Pt was previously independent/mod I with ADL completion   Instrumental Activities of Daily Living (IADL)   Previous Responsibilities meal prep;housekeeping;laundry;shopping;medication management;finances   IADL Comments Pt was previously mod I with IADL completion. Pt mother able to assist as needed   Disability/Function   Hearing Difficulty or Deaf no   Wear Glasses or Blind yes   Vision Management glasses   Concentrating, Remembering or Making Decisions Difficulty no   Difficulty Communicating no   Difficulty Eating/Swallowing no   Walking or Climbing Stairs Difficulty yes   Walking or Climbing Stairs ambulation difficulty, requires equipment   Mobility Management Pt uses a quad cane   Dressing/Bathing Difficulty no   Toileting issues no   Doing Errands Independently Difficulty (such as shopping) no   Fall history within last six months no   Change in Functional Status Since Onset of Current Illness/Injury yes   General Information   Onset of Illness/Injury or Date of Surgery 11/01/21   Referring Physician Jonah Davis MD   Patient/Family  Therapy Goal Statement (OT) To return home   Additional Occupational Profile Info/Pertinent History of Current Problem Kavon Banerjee is a 56 year old male PMH NICM/DCM EF 10% (LVEDD 8.0 cm) who presents to the ER with dizziness and shortness of breath after leaving [facility] earlier today against medical advice after hospitalization for CHF/cardiogenic shock for the last 8 days.   Existing Precautions/Restrictions cardiac   Left Upper Extremity (Weight-bearing Status) full weight-bearing (FWB)   Right Upper Extremity (Weight-bearing Status) full weight-bearing (FWB)   Left Lower Extremity (Weight-bearing Status) full weight-bearing (FWB)   Right Lower Extremity (Weight-bearing Status) full weight-bearing (FWB)   Heart Disease Risk Factors Medical history   General Observations and Info Activity: ambulate every shift   Cognitive Status Examination   Orientation Status orientation to person, place and time   Affect/Mental Status (Cognitive) WNL   Follows Commands WNL   Cognitive Status Comments Pt is alert, oriented and generally appropriate in conversation. Will continue to monitor   Visual Perception   Impact of Vision Impairment on Function (Vision) Pt reports L eye blind at baseline   Sensory   Sensory Quick Adds No deficits were identified   Pain Assessment   Patient Currently in Pain No   Integumentary/Edema   Integumentary/Edema Comments Pt with slight BLE edema   Range of Motion Comprehensive   General Range of Motion no range of motion deficits identified   Strength Comprehensive (MMT)   General Manual Muscle Testing (MMT) Assessment no strength deficits identified   Bed Mobility   Bed Mobility supine-sit;sit-supine   Supine-Sit [place] (Bed Mobility) supervision   Sit-Supine [place] (Bed Mobility) supervision   Transfers   Transfers sit-stand transfer   Sit-Stand Transfer   Sit-Stand [place] (Transfers) supervision   Activities of Daily Living   BADL Assessment lower body  dressing   Lower Body Dressing Assessment   [place] Level (Lower Body Dressing) supervision   Clinical Impression   Criteria for Skilled Therapeutic Interventions Met (OT) yes;meets criteria;skilled treatment is necessary   OT Diagnosis decreased activity tolerance and independence with ADL completion   OT Problem List-Impairments impacting ADL problems related to;activity tolerance impaired;strength   Assessment of Occupational Performance 3-5 Performance Deficits   Identified Performance Deficits g/h, IADL completion   Planned Therapy Interventions (OT) ADL retraining;IADL retraining;cognition;strengthening;home program guidelines;progressive activity/exercise   Clinical Decision Making Complexity (OT) low complexity   Therapy Frequency (OT) 3x/week   Predicted Duration of Therapy 1 week   Anticipated Equipment Needs Upon Discharge (OT) other (see comments)  (TBD)   Risk & Benefits of therapy have been explained evaluation/treatment results reviewed;care plan/treatment goals reviewed;risks/benefits reviewed;current/potential barriers reviewed;participants voiced agreement with care plan;participants included;patient;mother   OT Discharge Planning    OT Discharge Recommendation (DC Rec) Home with assist;home with outpatient cardiac rehab   OT Rationale for DC Rec Pt is primarily SBA/Independent with ADL completion and functional mobility. Pt would benefit from OP CR Phase II to increase activity tolerance and exercise endurance    Total Evaluation Time (Minutes)   Total Evaluation Time (Minutes) 5

## 2021-11-02 NOTE — PLAN OF CARE
Problem: Adult Inpatient Plan of Care  Goal: Plan of Care Review  Outcome: Completed     Major Shift Events:  Patient arrived to 4A around 0215 from ED. Upon arrival patient was A&O x4 and mood varies. ART Line and Central Line placed in room on 4A. Pt in sinus rhythm, HR in low 100's and upper 90's. MAP goal greater than 65. 12 lead EKG done in the morning. Dobutamine running at straight rate and Levo titrated to maintain goal range. Pt sating well on 2-4L NC. Short of breath during exertion. Frequent urination, pt states he received lasix in ED and takes it at home. Using bedside urinal. Swollen R wrist, normal per patient.     Plan: Continue to titrate levo. Maintain MAP above 65 and monitor respiratory status.     For vital signs and complete assessments, please see documentation flowsheets.

## 2021-11-02 NOTE — ED PROVIDER NOTES
[place] EMERGENCY DEPARTMENT ([facility])  11/01/21    History     Chief Complaint   Patient presents with     Shortness of Breath     Hypotension     SANDRA Banerjee is a 56 year old male with PMH significant for acute on chronic systolic congestive heart failure (baseline EF 20%), nonischemic cardiomyopathy, pulmonary HTN, and PARUL who presents to the Emergency Department via ambulance for evaluation of shortness of breath and hypotension.  Per EMS, last known blood pressure reading was 50s/20s and patient was discharged from [facility] earlier today.  EMS reports the patient has felt dizzy, weak, and short of breath however he has been satting 100% on room air.  They state he received 500 cc of fluid in route.  He does endorse some chest discomfort, but denies pain.  EMS reports that he has been trending downwards with his blood pressure.  Patient reports that he feels anxious and was diagnosed with CHF about 2 years ago.  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  Patient is noted to have some cyanosis, but he states this has been going on for a while.    Per review of the chart, patient was discharged from [facility] earlier today (admitted from 10/25-11/1) for clinically decompensated heart failure.  He had severely reduced EF to 10-15% from his baseline 20 and was likely thought to be cardiorenal PARUL.  Following this patient had heart cath on 10/26 showing severe fluid overload and pulmonary HTN.  Patient had a similar presentation on admit with hypotension, and some reported orthopnea.  Patient recommended to stay until 11/2, but did not want to spend another night in the hospital was discharged on 11/1.  Patient has cardiology evaluation arranged here for tomorrow at 8 AM.    Past Medical History  History reviewed. No pertinent past medical history.  History reviewed. No pertinent surgical history.  No current outpatient  medications on file.    Allergies   Allergen Reactions     Pneumococcal Polysaccharides Other (See Comments)     Past medical history, past surgical history, medications, and allergies were reviewed with the patient. Additional pertinent items: None    Family History  History reviewed. No pertinent family history.  Family history was reviewed with the patient. Additional pertinent items: None    Social History  Social History     Tobacco Use     Smoking status: Never Smoker     Smokeless tobacco: Never Used   Substance Use Topics     Alcohol use: Not Currently     Comment: used to be a heavy drinker up until 50     Drug use: None      Social history was reviewed with the patient. Additional pertinent items: None      Review of Systems   Constitutional: Positive for fatigue.   Respiratory: Positive for shortness of breath.    Cardiovascular: Positive for chest pain.   Gastrointestinal: Positive for abdominal pain and nausea.   All other systems reviewed and are negative.    A complete review of systems was performed with pertinent positives and negatives noted in the HPI, and all other systems negative.    Physical Exam   BP: 97/68  Pulse: 100  Temp: 98.4  F (36.9  C)  Resp: 20  Weight: 95.3 kg (210 lb)  SpO2: 98 %  Physical Exam  Vitals and nursing note reviewed.   Constitutional:       Appearance: He is ill-appearing and toxic-appearing.      Comments: Ill appearing WM with cold extremities, perioral and distal UE cyanosis, hypoxia   HENT:      Head: Normocephalic and atraumatic.      Nose: Nose normal.   Eyes:      Extraocular Movements: Extraocular movements intact.      Conjunctiva/sclera: Conjunctivae normal.   Cardiovascular:      Rate and Rhythm: Regular rhythm. Tachycardia present.   Pulmonary:      Comments: Tachypneic, clear breath sounds  Abdominal:      General: There is no distension.      Palpations: Abdomen is soft.      Tenderness: There is abdominal tenderness.      Comments: Epigastric TTP    Musculoskeletal:      Cervical back: Neck supple. No rigidity.      Right lower leg: Edema present.      Left lower leg: Edema present.   Skin:     General: Skin is dry.      Capillary Refill: Capillary refill takes more than 3 seconds.      Comments: Cold, dry extremities with cyanosis of fingertips     Neurological:      General: No focal deficit present.      Mental Status: He is alert. Mental status is at baseline.   Psychiatric:         Mood and Affect: Mood normal.         Behavior: Behavior normal.       ED Course      Procedures            EKG Interpretation:      Interpreted by Nichole Paulino MD  Time reviewed: 2254  Symptoms at time of EKG: Chest Pain, Hypotension   Rhythm: sinus tachycardia  Rate: Tachycardia  Axis: Left Axis Deviation  Ectopy: none  Conduction: left bundle branch block (complete)  ST Segments/ T Waves: No acute ischemic changes  Q Waves: none  Comparison to prior: No old EKG available    Clinical Impression: Abnormal ECG      Critical Care Addendum    My initial assessment, based on my review of prehospital provider report, review of nursing observations, review of vital signs, focused history, physical exam, review of cardiac rhythm monitor, 12 lead ECG analysis, discussion with cardiology and interpretation of bedside ultrasound , established that Kavon Banerjee has severe hypotension and and cardiogenic shock, which requires immediate intervention, and therefore he is critically ill.     After the initial assessment, the care team initiated multiple lab tests and initiated medication therapy with dobutamine, levophed to provide stabilization care. Due to the critical nature of this patient, I reassessed nursing observations, vital signs, physical exam, review of cardiac rhythm monitor, 12 lead ECG analysis, mental status, neurologic status and respiratory status multiple times prior to his disposition.     Time also spent performing documentation, reviewing test results, discussion  with consultants and coordination of care.     Critical care time (excluding teaching time and procedures): 50 minutes.        Results for orders placed or performed during the hospital encounter of 11/01/21   XR Chest Port 1 View     Status: None    Narrative    EXAM: XR CHEST PORT 1 VIEW  LOCATION: [facility]  DATE/TIME: 11/1/2021 10:56 PM    INDICATION: SOB  COMPARISON: None.      Impression    IMPRESSION: Moderate to marked enlargement of the cardiac silhouette. No evidence of congestive heart failure or pneumonia. No visible pneumothorax. There is a 6 cm curvilinear metallic density overlying the right distal clavicle which may be external to   the patient and is indeterminate.   INR     Status: Abnormal   Result Value Ref Range    INR 1.44 (H) 0.85 - 1.15   Comprehensive metabolic panel     Status: Abnormal   Result Value Ref Range    Sodium 125 (L) 133 - 144 mmol/L    Potassium 4.5 3.4 - 5.3 mmol/L    Chloride 93 (L) 94 - 109 mmol/L    Carbon Dioxide (CO2) 22 20 - 32 mmol/L    Anion Gap 10 3 - 14 mmol/L    Urea Nitrogen 21 7 - 30 mg/dL    Creatinine 1.47 (H) 0.66 - 1.25 mg/dL    Calcium 8.5 8.5 - 10.1 mg/dL    Glucose 105 (H) 70 - 99 mg/dL    Alkaline Phosphatase 107 40 - 150 U/L    AST 22 0 - 45 U/L    ALT 32 0 - 70 U/L    Protein Total 6.2 (L) 6.8 - 8.8 g/dL    Albumin 2.9 (L) 3.4 - 5.0 g/dL    Bilirubin Total 1.4 (H) 0.2 - 1.3 mg/dL    GFR Estimate 53 (L) >60 mL/min/1.73m2   Lactic acid whole blood     Status: Abnormal   Result Value Ref Range    Lactic Acid 3.8 (H) 0.7 - 2.0 mmol/L   Troponin I     Status: Normal   Result Value Ref Range    Troponin I 0.027 0.000 - 0.045 ug/L   Nt probnp inpatient (BNP)     Status: Abnormal   Result Value Ref Range    N terminal Pro BNP Inpatient 3,891 (H) 0 - 900 pg/mL   CBC with platelets and differential     Status: Abnormal   Result Value Ref Range    WBC Count 10.2 4.0 - 11.0 10e3/uL    RBC Count 4.37 (L) 4.40 - 5.90  10e6/uL    Hemoglobin 9.9 (L) 13.3 - 17.7 g/dL    Hematocrit 32.1 (L) 40.0 - 53.0 %    MCV 74 (L) 78 - 100 fL    MCH 22.7 (L) 26.5 - 33.0 pg    MCHC 30.8 (L) 31.5 - 36.5 g/dL    RDW 18.9 (H) 10.0 - 15.0 %    Platelet Count 315 150 - 450 10e3/uL    % Neutrophils 71 %    % Lymphocytes 14 %    % Monocytes 12 %    % Eosinophils 1 %    % Basophils 1 %    % Immature Granulocytes 1 %    NRBCs per 100 WBC 0 <1 /100    Absolute Neutrophils 7.4 1.6 - 8.3 10e3/uL    Absolute Lymphocytes 1.4 0.8 - 5.3 10e3/uL    Absolute Monocytes 1.2 0.0 - 1.3 10e3/uL    Absolute Eosinophils 0.1 0.0 - 0.7 10e3/uL    Absolute Basophils 0.1 0.0 - 0.2 10e3/uL    Absolute Immature Granulocytes 0.1 (H) <=0.0 10e3/uL    Absolute NRBCs 0.0 10e3/uL   iStat Gases Electrolytes ICA Glucose Venous, POCT     Status: Abnormal   Result Value Ref Range    CPB Applied No     Hematocrit POCT 35 (L) 40 - 53 %    Calcium, Ionized Whole Blood POCT 4.6 4.4 - 5.2 mg/dL    Glucose Whole Blood POCT 106 (H) 70 - 99 mg/dL    Bicarbonate Venous POCT 24 21 - 28 mmol/L    Hemoglobin POCT 11.9 (L) 13.3 - 17.7 g/dL    Potassium POCT 4.6 3.4 - 5.3 mmol/L    Sodium POCT 126 (L) 133 - 144 mmol/L    pCO2 Venous POCT 33 (L) 40 - 50 mm Hg    pO2 Venous POCT 36 25 - 47 mm Hg    pH Venous POCT 7.46 (H) 7.32 - 7.43    O2 Sat, Venous POCT 74 (L) 94 - 100 %   Extra Red Top Tube     Status: None   Result Value Ref Range    Hold Specimen JI    iStat Gases (lactate) venous, POCT     Status: Abnormal   Result Value Ref Range    Lactic Acid POCT 3.1 (H) <=2.0 mmol/L    Bicarbonate Venous POCT 23 21 - 28 mmol/L    O2 Sat, Venous POCT 71 (L) 94 - 100 %    pCO2V Venous POCT 32 (L) 40 - 50 mm Hg    pH Venous POCT 7.46 (H) 7.32 - 7.43    pO2 Venous POCT 34 25 - 47 mm Hg   iStat Troponin, POCT     Status: Normal   Result Value Ref Range    TROPPC POCT 0.02 <=0.12 ug/L   Asymptomatic COVID-19 Virus (Coronavirus) by PCR Nasopharyngeal     Status: Normal    Specimen: Nasopharyngeal; Swab   Result  Value Ref Range    SARS CoV2 PCR Negative Negative    Narrative    Testing was performed using the Xpert Xpress SARS-CoV-2 Assay on the  Cepheid Gene-Xpert Instrument Systems. Additional information about  this Emergency Use Authorization (EUA) assay can be found via the Lab  Guide. This test should be ordered for the detection of SARS-CoV-2 in  individuals who meet SARS-CoV-2 clinical and/or epidemiological  criteria. Test performance is unknown in asymptomatic patients. This  test is for in vitro diagnostic use under the FDA EUA for  laboratories certified under CLIA to perform high complexity testing.  This test has not been FDA cleared or approved. A negative result  does not rule out the presence of PCR inhibitors in the specimen or  target RNA in concentration below the limit of detection for the  assay. The possibility of a false negative should be considered if  the patient's recent exposure or clinical presentation suggests  COVID-19. This test was validated by the [facility]. This laboratory is certified under  the Clinical Laboratory Improvement Amendments of 1988 (CLIA-88) as  qualified to perform high complexity laboratory testing.     Potassium     Status: Normal   Result Value Ref Range    Potassium 4.1 3.4 - 5.3 mmol/L   Magnesium     Status: Normal   Result Value Ref Range    Magnesium 2.0 1.6 - 2.3 mg/dL   TSH with free T4 reflex     Status: Abnormal   Result Value Ref Range    TSH 6.50 (H) 0.40 - 4.00 mU/L   Drug abuse screen 1 urine (ED)     Status: Normal   Result Value Ref Range    Amphetamines Urine Screen Negative Screen Negative    Barbiturates Urine Screen Negative Screen Negative    Benzodiazepines Urine Screen Negative Screen Negative    Cannabinoids Urine Screen Negative Screen Negative    Cocaine Urine Screen Negative Screen Negative    Opiates Urine Screen Negative Screen Negative   Glucose by meter     Status: Abnormal   Result Value  Ref Range    GLUCOSE BY METER POCT 104 (H) 70 - 99 mg/dL   Adult Type and Screen     Status: None   Result Value Ref Range    ABO/RH(D) O POS     Antibody Screen Negative Negative    SPECIMEN EXPIRATION DATE [id number]    CBC with platelets differential     Status: Abnormal    Narrative    The following orders were created for panel order CBC with platelets differential.  Procedure                               Abnormality         Status                     ---------                               -----------         ------                     CBC with platelets and d...[[id number]]  Abnormal            Final result                 Please view results for these tests on the individual orders.   ABO/Rh type and screen     Status: None    Narrative    The following orders were created for panel order ABO/Rh type and screen.  Procedure                               Abnormality         Status                     ---------                               -----------         ------                     Adult Type and Screen[[id number]]                            Final result                 Please view results for these tests on the individual orders.   [place] Draw     Status: None    Narrative    The following orders were created for panel order [place] Draw.  Procedure                               Abnormality         Status                     ---------                               -----------         ------                     Extra Red Top Tube[[id number]]                               Final result                 Please view results for these tests on the individual orders.   Urine Drugs of Abuse Screen     Status: Normal    Narrative    The following orders were created for panel order Urine Drugs of Abuse Screen.  Procedure                               Abnormality         Status                     ---------                               -----------         ------                     Drug abuse screen 1  urin...[[id number]]  Normal              Final result                 Please view results for these tests on the individual orders.   ABO/Rh type and screen *Canceled*     Status: None ()    Narrative    The following orders were created for panel order ABO/Rh type and screen.  Procedure                               Abnormality         Status                     ---------                               -----------         ------                       Please view results for these tests on the individual orders.   ABO/Rh type and screen *Canceled*     Status: None ()    Narrative    The following orders were created for panel order ABO/Rh type and screen.  Procedure                               Abnormality         Status                     ---------                               -----------         ------                     Adult Type and Screen[[id number]]                                                         Please view results for these tests on the individual orders.     Medications   norepinephrine (LEVOPHED) 16 mg in  mL infusion MAX CONC CENTRAL LINE (0.08 mcg/kg/min × 95.3 kg Intravenous Rate/Dose Change 11/1/21 2341)   DOBUTamine 500 mg in dextrose 5% 250 mL (adult std conc) premix (5 mcg/kg/min × 95.3 kg Intravenous Rate/Dose Change 11/1/21 2325)   lidocaine 1 % 0.1-1 mL (has no administration in time range)   lidocaine (LMX4) cream (has no administration in time range)   sodium chloride (PF) 0.9% PF flush 3 mL (0 mLs Intracatheter Hold 11/1/21 0509)   sodium chloride (PF) 0.9% PF flush 3 mL (has no administration in time range)   medication instruction (has no administration in time range)   alum & mag hydroxide-simethicone (MAALOX) suspension 30 mL (has no administration in time range)   acetaminophen (TYLENOL) tablet 650 mg (has no administration in time range)   acetaminophen (TYLENOL) Suppository 650 mg (has no administration in time range)   HOLD nitroGLYcerin IF (has no administration in  time range)   Reason ACE/ARB/ARNI order not selected (has no administration in time range)   Reason beta blocker not prescribed (has no administration in time range)   allopurinol (ZYLOPRIM) tablet 300 mg (has no administration in time range)   colchicine (COLCYRS) tablet 0.6 mg (has no administration in time range)   furosemide (LASIX) injection 80 mg (80 mg Intravenous Given 11/2/21 0029)   HYDROmorphone (PF) (DILAUDID) injection 0.3 mg (0.3 mg Intravenous Given 11/2/21 0135)        Assessments & Plan (with Medical Decision Making)   MDM:    56 year old M with severe CHF (EF 10 percent or less) who presents in cardiogenic shock.  Immediately placed on oxygen for cyanosis, BP remained low with 70s systolic.  Bedside echocardiogram showed severely reduced EF, plethoric IV, so started on peripheral levophed with plan to add dobutamine when blood pressure stabilize.  ECG shows sgarbossa negative LBBB, labs show stable anemia, hyponatremia to 125, creatinine of 1.5, unremarkable troponin.  Lactic acid is elevated >3, however DOUBT BACTERIAL SEPSIS, likely secondary to poor perfusion and cardiogenic shock.  Cardiology consulted and dobutamine initiated.  Labs otherwise grossly unremarkable except as described above.  Will be admitted to the cardiac icu.    On re-evaluation markedly improved after norepi and dobutamine, perfusion improving and no more evidence of cyanosis.  I have discussed all test results and the plan of care with the patient, who is agreeable for admission.    I have reviewed the nursing notes. I have reviewed the findings, diagnosis, plan and need for follow up with the patient.    Current Discharge Medication List          Final diagnoses:   Cardiogenic shock (H)   Acute on chronic congestive heart failure, unspecified heart failure type (H)       --  Nichole Paulino MD  [facility] EMERGENCY DEPARTMENT  11/1/2021

## 2021-11-02 NOTE — PROGRESS NOTES
Admitted/transferred from: ED  Reason for admission/transfer: Increased monitoring due to cardiac status  2 RN skin assessment: completed by An PEREIRA and RANDALL Bee   Result of skin assessment and interventions/actions: R wrist bump, scatter scabs and abrasions. L radial ART line and R internal jugular placed.   Height, weight, drug calc weight: Done  Patient belongings (see Flowsheet)  MDRO education added to care planN/A  ?

## 2021-11-03 NOTE — PROVIDER NOTIFICATION
Card fellow notified of patient's blood pressure with MAP in upper 50s,  Order received to monitor for now and to notify them if MAP fall below 55. Patient currently sleeping will monitor.

## 2021-11-03 NOTE — PLAN OF CARE
Pt getting agitated, upset about fluid restriction, explaining that the BP readings are inaccurate, machine doesn't work properly, HR is wrong. Calmly tried to redirect pt, pt admits to being anxious. VSS at this time. Pt in bed talking on phone.

## 2021-11-03 NOTE — PLAN OF CARE
Neuro: A&Ox4. Calls appropriately. Mood labile, fast speech, occasionally appears manic.  Cardiac: Tachycardic with LBBB, On dobutamine at 7.5 set rate. BP stable.    Respiratory: SPO2 >92 on RA. Dyspnea occasionally on exertion.   GI/: Lasix 80 mg x2 this shift. Good output, uses urinal. Last BM 11/1. Does not want stool softener/laxative.  Diet/appetite: Tolerating diet, good appetite. Was briefly upset about fluid restriction, accepting after talking about it's importance and plan of care.  Activity: Steady gait, up to chair and in halls, independent in ADLs  Pain: Denies.  Skin: No new deficits noted.  LDA's: PIV saline locked. Internal jugular TLC     Plan: Continue with POC. Notify primary team with changes. Pt mostly cooperative and agreeable this shift. Occasionally upset about monitor and fluid restrictions, but redirectable. OOB, pacing room, and moving table/commode/chair around most of shift. Frequently removes BP cuff, SPO2 cable.

## 2021-11-03 NOTE — PROGRESS NOTES
CLINICAL NUTRITION SERVICES    Reason for Assessment:  Low-sodium (2 g/day) nutrition education    Diet History:  No history of receiving low-sodium nutrition education in the past per chart review.     Nutrition Diagnosis:  Food- and nutrition-related knowledge deficit r/t no previous knowledge of low-sodium diet AEB pt report of no previous formal low-sodium nutrition education.    Nutrition Prescription/Recs:  Continue low-sodium diet.      Interventions:  Nutrition Education: unable to complete at this time, pt busy with cares and then asleep.     Goals:    Pt will verbalize at least five high sodium foods and the importance of avoiding added salt to foods for cooking or seasoning foods.     Follow-up:   Patient to ask any further nutrition-related questions before discharge. In addition, pt may request outpatient RD appointment.      Janina Andre RD, MS, LD  SICU: [id number]

## 2021-11-03 NOTE — PROGRESS NOTES
[facility]    Cardiology Progress Note- Cards 2        Date of Admission:  11/1/2021     Assessment & Plan: HVSL   Kavon Banerjee is a 56 year old male with PMH of polysubstance abuse (EtOH, methamphetamine), HFrEF 2/2 NICM (last EF 10%) and gout who presented with acute hypoxic respiratory failure and ambulatory cardiogenic shock    Neuro  No acute concerns    CV  #HFrEF 2/2 NICM (last EF 5-10%), NYHA IIIb with acute exacerbation  #Ambulatory cardiogenic shock  Patient with known h/o NICM thought 2/2 polysubstance abuse (meth, EtOH) previously living in [place] where he reported feeling well until about 2 months ago, after which time he has progressive exertional intolerance, PND/orthopnea, abdominal distension and edema. Hospitalized At [facility] for inotropes and diuresis and discharged on 11/1, but presented again evening of 11/2 with recurrence of symptoms after rapid wean of inotrope therapy. Would favor presentation is likely due to progressive underlying disease, ongoing meth use (last used 1 month ago), non-optimized medical therapy, and non-adherence to diet and fluid restriction. Negative coronary angiogram on 10/26/21. Started on dobutamine and diuretics with robust UOP. Will work on optimizing fluid status and implementing GDMT. He is not currently a candidate for advanced therapies due to recent meth use (1 month ago) and would have concerns regarding home inotrope therapy in setting of this at this time.  -Inotropes: continue dobutamine 7.5mcg/kg/min; will assess for wean today  -BB: hold given inotrope use  -ACEi/ARB/ARNI: previously on entresto; discharged from [facility] on losartan 25mg BID; will reintroduce in coming days  -Volume status: hypervolemic; continue lasix 80mg BID  -Lefty Ant: not on PTA  -SGLT2: not on PTA  -SCD ppx: not in place  -strict I/O, daily weights  -will need CORE referral  -plan for RHC tomorrow    Resp  No acute  "issues    GI  No acute issues    FEN/Renal  #PARUL  In setting of volume overload. Improving with diuretics  -trend BMP    #Hyponatremia  In setting of volume overload  -trend with diuretics    ID  No acute concerns    Endo  #T2DM  Based on elevated A1C  -trend BG    MSK/Rheum  #Gout  -continue colchicine, allopurinol    Heme/Onc  #Iron deficiency anemia  Based on low iron and ferritin. No evidence GIB  -IV iron ordered    Psych  #Polysubstance abuse  Noted history of EtOH abuse as well as prior polysubstance abuse (reports prior use of cocaine, methamphetamine, mushrooms, LSD) with most recent meth use 1 month ago. Notes he feels he has EtOH under control, but does still \"sip\" on liquor at times when out with friends. Has not done chemical dependency treatment recently  -continue reiteration regarding abstinence from substances  -will need to complete chemical dependency program to be considered for advanced therapies in future      Diet:   2 gram sodium  DVT Prophylaxis: Low Risk/Ambulatory with no VTE prophylaxis indicated  Pendleton Catheter: Not present  Code Status:         Disposition Plan   Expected discharge: 4 - 7 days, recommended to prior living arrangement once fluid volume status optimized on oral medication.      Entered: Bridget Hope MD 11/03/2021, 7:08 AM       The patient's care was discussed with the Attending Physician, Dr. Rivers, Bedside Nurse and Patient.    Bridget Hope MD  [facility]  Please see sign in/sign out for up to date coverage information  ______________________________________________________________________    Interval History   Nursing notes reviewed. Issue with dobutamine infusion dosage noted overnight (see cross cover note). This AM, reports feeling quite well and without complaint. Feels swelling is improved    Data reviewed today: I reviewed all medications, new labs and imaging results over the last 24 hours. I personally " reviewed no imaging or EKG's to review for today.     Physical Exam   Vital Signs: Temp: 98.8  F (37.1  C) Temp src: Axillary BP: 93/50 Pulse: 106   Resp: 18 SpO2: 95 % O2 Device: None (Room air) Oxygen Delivery: 2 LPM  Weight: 210 lbs 8.63 oz  GENERAL APPEARANCE: well-developed, well-appearing male in NAD, polite and conversational  HEENT: at/nc, eomi, mmm, sclera anicteric, no oral ulcerations, no xanthelasmas   NECK: no adenopathy, thyroid normal to palpation, JVP e;levated to earlobe at 70 degrees  RESPIRATORY: CTAB on RA, nonlabored, no accessory muscle use  CARDIOVASCULAR: rrr, s1, s2, no murmur, rub or gallop, no s3 or s4  GI: soft, slightly distended, nt, bs+  EXTREMITIES: no gross deformity, adequate muscle bulk, 1+ BLE edema  NEURO: alert, interactive, speech fluent, grossly nonfocal, moving all 4  VASC: Radial and DP pulses are normal in volumes and symmetric bilaterally  SKIN: no ecchymoses, no rashes on exposed skin    Data   Recent Labs   Lab 11/03/21  0339 11/02/21  2032 11/02/21  1550 11/02/21  0533 11/02/21  0533 11/02/21  0022 11/01/21  2256   WBC 9.4  --   --   --  7.7  --  10.2   HGB 9.2*  --   --   --  9.5*  --  9.9*   MCV 75*  --   --   --  74*  --  74*     --   --   --  256  --  315   INR 1.41*  --   --   --  1.46*  --  1.44*   *  132*  --  135  --  132*   < > 125*   POTASSIUM 3.8  3.8 4.2 3.3*   < > 3.5   < > 4.5   CHLORIDE 100  100  --  99  --  97   < > 93*   CO2 26  26  --  29  --  28   < > 22   BUN 15  15  --  17  --  19   < > 21   CR 1.26*  1.26*  --  1.27*  --  1.40*   < > 1.47*   ANIONGAP 6  6  --  7  --  7   < > 10   JESSICA 8.2*  8.2*  --  8.1*  --  8.2*   < > 8.5   GLC 93  93  --  122*  --  109*   < > 105*   ALBUMIN 2.9*  --   --   --  2.9*   < > 2.9*   PROTTOTAL 5.9*  --   --   --  6.0*   < > 6.2*   BILITOTAL 1.0  --   --   --  1.3   < > 1.4*   ALKPHOS 103  --   --   --  101   < > 107   ALT 25  --   --   --  29   < > 32   AST 13  --   --   --  19   < > 22    TROPONIN  --   --   --   --   --   --  0.027    < > = values in this interval not displayed.     No results found for this or any previous visit (from the past 24 hour(s)).  Medications     DOBUTamine 7.5 mcg/kg/min (11/03/21 1300)     - MEDICATION INSTRUCTIONS -       ACE/ARB/ARNI NOT PRESCRIBED       BETA BLOCKER NOT PRESCRIBED         allopurinol  300 mg Oral Daily     colchicine  0.6 mg Oral Daily     furosemide  80 mg Intravenous BID     iron sucrose (VENOFER) intermittent infusion (200 mg)  200 mg Intravenous Q24H     polyethylene glycol 400  1 drop Both Eyes 4x Daily     sodium chloride (PF)  3 mL Intracatheter Q8H       I have reviewed today's vital signs, notes, medications, labs and imaging.  I have also seen and examined the patient and agree with the findings and plan as outlined above.  Pt with , UO 7.3L on 7.5 mcg/kg/min Dbx with Cr 1.26 and Hgb 9.2.  Assessment: Pt with nonischemic DCM with EF 10% and hx of meth use in past one month.  Plan is to continue diuresis with tranistion to GMT.  Pt with guarded prognosis.  Pt seen X2 for total critical care time 40 min.     Justice Rivers MD, PhD  Professor, Heart Failure and Cardiac Transplantation  [facility]

## 2021-11-03 NOTE — PROGRESS NOTES
D/I: Patient on unit 4A Surgical/Neuro ICU   Neuro- A & O x4. Pupils equal and reactive.  CV- Sinus tachycardia 100-110's. MAP goal >60. Maintained throughout night. Afebrile. Dobutatmine titrated up per order following results of VBG  Pulm- Room air  GI- Sodium restriction. 2L fluid restrict.  - Voiding  Gtts- Dobutatmine @ 7.5 - straight rate.   Skin- intact  Pain- Denies  Lines and drains- R internal jugular x3, PIV x2.   See flow sheets for further interventions and assessments.   A: Stable   P: Continue to monitor pt closely. Notify MD of significant changes

## 2021-11-03 NOTE — PROGRESS NOTES
Notified by bedside nurse around 9:30 PM that upon checking pump parameters dobutamine was infusing at ~31 mL/hr (approximately 11 mcg/kg/min), despite orders for 5 mcg/kg/min or 14.3 mL/min. Unclear for how long the pump had been programmed this way, but based on oxyhemoglobin trend- likely since the night prior. Nursing reprogrammed the pump and we weaned dobutamine to 7.5 mcg/kg/min at that time. Plan to monitor VBGs for the night and titrate dobutamine.    Christelle Davis MD  Cardiology Fellow

## 2021-11-04 NOTE — PROGRESS NOTES
CLINICAL NUTRITION SERVICES    Reason for Assessment:  Low-sodium (2 g/day) nutrition education    Diet History:  Pt reports history of receiving low-sodium nutrition education in the past. Reports he follows a low sodium diet at home and him and his mom are aware of content of foods and foods to stay away from.     Nutrition Diagnosis:  Food- and nutrition-related knowledge deficit r/t no previous knowledge of low-sodium diet AEB pt report of no previous formal low-sodium nutrition education.    Nutrition Prescription/Recs:  Continue low-sodium diet.      Interventions:  Nutrition Education  1. Provided verbal instruction on low-sodium meal planning.  2. Provided the following handouts: How to Read Nutrition Labels, Low-Sodium Foods and Drinks, Tips for a Low-Sodium Diet, Seasoning Your Foods Without Adding Salt, and Managing Fluid Restriction.      Goals:    Pt will verbalize at least five high sodium foods and the importance of avoiding added salt to foods for cooking or seasoning foods.     Follow-up:   Patient to ask any further nutrition-related questions before discharge. In addition, pt may request outpatient RD appointment.      Janina Andre RD, MS, LD  SICU: [id number]

## 2021-11-04 NOTE — PLAN OF CARE
"Patient is A & O x4, anxious, fidgety, up and down, shaky at times, given melatonin and one time dose of ativan for sleep, able to rest; long conversation with patient about mental health stability and patient would like to talk to a psychiatrist, pt also contemplating LVAD versus heart transplant and having guilt of \"taking a perfectly good heart that someone else deserves more than himself.\" ST with BBB, -110s, VSS, placed n 2L of O2 while sleeping; denies pain, dobutamine gtt running, adequate UOP, up ad aliya; NPO since midnight for possible heart cath.  Continue with current plan of care and notify MD as needed.    Linda Kincaid RN   "

## 2021-11-04 NOTE — PLAN OF CARE
Neuro: A&Ox4. Calls appropriately. Paranoid, manic at times. Psychiatry saw pt, consulted psychology. Can be verbally aggressive.  Cardiac: SR LBBB. VSS on 5 of Dobutamine.  Respiratory: SPO2 >94 on RA while awake. Has used NC overnight.   GI/: Adequate urine output. Uses urinal. 80mg Lasix BID. Last BM 11/1, refused any bowel meds.  Diet/appetite: NPO today for Cath.  Activity: Independent in room. Up to chair and ambulating.  Pain: Denies.  Skin: No new deficits noted.  LDA's: internal jugular TLC     Plan: Pt anxious throughout shift, manic, verbally aggressive twice. Mother in to visit this afternoon, updated with plan of care. Waiting for heart cath today, NPO. Dobutamine at 5, no s/s of distress when rate decreased. Continue with POC. Notify primary team with changes

## 2021-11-04 NOTE — PROGRESS NOTES
[facility]    Cardiology Progress Note- Cards 2        Date of Admission:  11/1/2021     Assessment & Plan: HVSL   Kavon Banerjee is a 56 year old male with PMH of polysubstance abuse (EtOH, methamphetamine), HFrEF 2/2 NICM (last EF 10%) and gout who presented with acute hypoxic respiratory failure and ambulatory cardiogenic shock    Changes Today (11/4)  -attempt wean of dobutamine  -start entresto  -continue IV lasix  -RHC today  -psychiatry consult    Neuro  No acute concerns    CV  #HFrEF 2/2 NICM (last EF 5-10%), NYHA IIIb with acute exacerbation  #Ambulatory cardiogenic shock  Patient with known h/o NICM thought 2/2 polysubstance abuse (meth, EtOH) previously living in [place] where he reported feeling well until about 2 months ago, after which time he has progressive exertional intolerance, PND/orthopnea, abdominal distension and edema. Hospitalized At [facility] for inotropes and diuresis and discharged on 11/1, but presented again evening of 11/2 with recurrence of symptoms after rapid wean of inotrope therapy. Would favor presentation is likely due to progressive underlying disease, ongoing meth use (last used 1 month ago), non-optimized medical therapy, and non-adherence to diet and fluid restriction. Negative coronary angiogram on 10/26/21. Started on dobutamine and diuretics with robust UOP. Will work on optimizing fluid status and implementing GDMT. He is not currently a candidate for advanced therapies due to recent meth use (1 month ago) and would have concerns regarding home inotrope therapy in setting of this at this time.  -Inotropes: attempt wean of dobutamine to 5mcg/kg/min today; follow SVO2, labs  -BB: hold given inotrope use  -ACEi/ARB/ARNI: start entresto 24/26  -Volume status: hypervolemic; continue lasix 80mg BID  -Lefty Ant: not on PTA  -SGLT2: not on PTA  -SCD ppx: not in place  -strict I/O, daily weights  -will need CORE referral  -plan  "for RHC today    Resp  No acute issues    GI  No acute issues    FEN/Renal  #PARUL  In setting of volume overload. Improving with diuretics  -trend BMP    #Hyponatremia, resolved  In setting of volume overload  -trend with diuretics    ID  No acute concerns    Endo  #T2DM  Based on elevated A1C  -trend BG    MSK/Rheum  #Gout  -continue colchicine, allopurinol    Heme/Onc  #Iron deficiency anemia  Based on low iron and ferritin. No evidence GIB  -IV iron ordered    Psych  #Polysubstance abuse  Noted history of EtOH abuse as well as prior polysubstance abuse (reports prior use of cocaine, methamphetamine, mushrooms, LSD) with most recent meth use 1 month ago. Notes he feels he has EtOH under control, but does still \"sip\" on liquor at times when out with friends. Has not done chemical dependency treatment recently  -continue reiteration regarding abstinence from substances  -will need to complete chemical dependency program to be considered for advanced therapies in future    #Mental health concerns  Noted to be making paranoid statements overnight and expressing feelings of anxiety. Will request psychiatry eval today  -psychiatry consulted      Diet:   2 gram sodium  DVT Prophylaxis: Low Risk/Ambulatory with no VTE prophylaxis indicated  Pendleton Catheter: Not present  Code Status:         Disposition Plan   Expected discharge: 4 - 7 days, recommended to prior living arrangement once fluid volume status optimized on oral medication.      Entered: Bridget Hope MD 11/04/2021, 7:05 AM       The patient's care was discussed with the Attending Physician, Dr. Rivers, Bedside Nurse and Patient.    Bridget Hope MD  [facility]  Please see sign in/sign out for up to date coverage information  ______________________________________________________________________    Interval History   Nursing notes reviewed. Noting some paranoid thoughts overnight and concerns about his " "underlying mental health. Patient requesting psychiatry eval. Otherwise ZEINAB. This AM, feeling stressed about overall picture of his health, but feels he wants to make progress.     Data reviewed today: I reviewed all medications, new labs and imaging results over the last 24 hours. I personally reviewed no imaging or EKG's to review for today.     Physical Exam   Vital Signs: Temp: 98.2  F (36.8  C) Temp src: Axillary BP: 112/85 Pulse: 112   Resp: 16 SpO2: 96 % O2 Device: Nasal cannula Oxygen Delivery: 2 LPM  Weight: 210 lbs 8.63 oz  GENERAL APPEARANCE: well-developed, well-appearing male in NAD, polite and conversational  HEENT: at/nc, eomi, mmm, sclera anicteric, no oral ulcerations, no xanthelasmas   NECK: no adenopathy, thyroid normal to palpation, JVP elevated to midneck at 70 degrees  RESPIRATORY: CTAB on RA, nonlabored, no accessory muscle use  CARDIOVASCULAR: rrr, s1, s2, no murmur, rub or gallop, no s3 or s4  GI: soft, slightly distended, nt, bs+  EXTREMITIES: no gross deformity, adequate muscle bulk, 1+ BLE edema  NEURO: alert, interactive, speech fluent, grossly nonfocal, moving all 4  VASC: Radial and DP pulses are normal in volumes and symmetric bilaterally  SKIN: no ecchymoses, no rashes on exposed skin  PSYCH: slightly tangential thought pattern and increased pace of speech, \"good\" mood, no SI/HI    Data   Recent Labs   Lab 11/04/21  0444 11/03/21  1712 11/03/21  0339 11/02/21  1550 11/02/21  0533 11/02/21  0022 11/01/21  2256   WBC 8.3  --  9.4  --  7.7   < > 10.2   HGB 9.2*  --  9.2*  --  9.5*   < > 9.9*   MCV 74*  --  75*  --  74*   < > 74*     --  259  --  256   < > 315   INR 1.33*  --  1.41*  --  1.46*   < > 1.44*     136 134 132*  132*   < > 132*   < > 125*   POTASSIUM 3.3*  3.3* 3.5 3.8  3.8   < > 3.5   < > 4.5   CHLORIDE 101  101 99 100  100   < > 97   < > 93*   CO2 26  26 27 26  26   < > 28   < > 22   BUN 14  14 14 15  15   < > 19   < > 21   CR 1.21  1.21 1.22 1.26*  " 1.26*   < > 1.40*   < > 1.47*   ANIONGAP 9  9 8 6  6   < > 7   < > 10   JESSICA 8.6  8.6 8.5 8.2*  8.2*   < > 8.2*   < > 8.5   GLC 70  70 116* 93  93   < > 109*   < > 105*   ALBUMIN 3.0*  --  2.9*  --  2.9*   < > 2.9*   PROTTOTAL 6.2*  --  5.9*  --  6.0*   < > 6.2*   BILITOTAL 1.0  --  1.0  --  1.3   < > 1.4*   ALKPHOS 110  --  103  --  101   < > 107   ALT 25  --  25  --  29   < > 32   AST 27  --  13  --  19   < > 22   TROPONIN  --   --   --   --   --   --  0.027    < > = values in this interval not displayed.     No results found for this or any previous visit (from the past 24 hour(s)).  Medications     DOBUTamine 5 mcg/kg/min (11/04/21 0659)     - MEDICATION INSTRUCTIONS -       ACE/ARB/ARNI NOT PRESCRIBED       BETA BLOCKER NOT PRESCRIBED         allopurinol  300 mg Oral Daily     colchicine  0.6 mg Oral Daily     furosemide  80 mg Intravenous BID     iron sucrose (VENOFER) intermittent infusion (200 mg)  200 mg Intravenous Q24H     polyethylene glycol 400  1 drop Both Eyes 4x Daily     potassium chloride  40 mEq Oral Once     sodium chloride (PF)  3 mL Intracatheter Q8H       I have reviewed today's vital signs, notes, medications, labs and imaging.  I have also seen and examined the patient and agree with the findings and plan as outlined above. Pt with expression of paranoa.  VSS with 3.7L UO on Dbx 5, lasix and entresto.  Labs with Cr 1.2 and K 3.3 secondary to diuresis.  Pt with non ischemic DCM on Dbx, paranoid expressive remarks, polysubstance abuse hx (meth one month ago), hx of alcohol use with RHC today. Total critical care time 45 min.     Justice Rivers MD, PhD  Professor, Heart Failure and Cardiac Transplantation  [facility]

## 2021-11-04 NOTE — CONSULTS
"          Initial Psychiatric Consult   Consult date: November 4, 2021         Reason for Consult, requesting source:    Patient requested psych consut   Requesting source: Justice Rivers    Labs and imaging reviewed. Discussed with nursing.         HPI:   Kavon Banerjee is a 56 year old male PMH NICM/DCM EF 10% (LVEDD 8.0 cm) who presents to the ER with dizziness and shortness of breath after leaving [facility] earlier today against medical advice after hospitalization for CHF/cardiogenic shock for the last 8 days.    Mr. Banerjee had asked to talk to psychologist or psychiatrist due to concerns over his mental health.  He says that in recent years he has gone through a lot of interpersonal stress and sometimes wonders about whether he may have schizophrenia or something.  It appears that he is somewhat paranoid about some aspects of his socializing with friends and also may have some ideas of reference.  He also has had periods of time he is quite depressed with loss of interest usual activities, poor sleep, poor appetite but never hopeless with suicidal.  He has some of these symptoms lately.  Also with quite a bit of anxiety and rare panic attacks.  No traumatic events or PTSD symptoms.  He has been feeling somewhat paranoid over the last 5 years but this also started during a period of heavy drug and alcohol use.  He was wondering whether his friends might be \"gaslighting\" him.           Past Psychiatric History:   No prior use of medications or psychotherapy         Substance Use and History:   He was a very heavy drinker for many years, was drinking over a liter per day of fireball whiskey, but quit for the most part about 3 years ago.  Also about 5 years ago he was using a variety of drugs including hallucinogens and methamphetamine but no IV use.  Never smoker.        Past Medical History:   PAST MEDICAL HISTORY: cardiomyopathy     PAST SURGICAL HISTORY: 3 surgeries for gout           " Family History:   FAMILY HISTORY: father was a heavy drinker.  No schizophrenia or bipolar illness        Social History:   SOCIAL HISTORY:   Social History     Tobacco Use     Smoking status: Never Smoker     Smokeless tobacco: Never Used   Substance Use Topics     Alcohol use: Not Currently     Comment: used to be a heavy drinker up until 50     Native of [place], but [place] most [place]. Worked in [employer], was  about 2000 to 2011, has a 15 year old daughter living with her mother in [place]. Back in [place] for about 3 months, living with mother.          Physical ROS:   The 10 point Review of Systems is negative other than noted in the HPI or here.           Medications:     Current Facility-Administered Medications   Medication     acetaminophen (TYLENOL) Suppository 650 mg     acetaminophen (TYLENOL) tablet 650 mg     allopurinol (ZYLOPRIM) tablet 300 mg     alum & mag hydroxide-simethicone (MAALOX) suspension 30 mL     colchicine (COLCYRS) tablet 0.6 mg     diphenhydrAMINE (BENADRYL) injection 50 mg     DOBUTamine 500 mg in dextrose 5% 250 mL (adult std conc) premix     EPINEPHrine (ADRENALIN) kit 0.3 mg     famotidine (PEPCID) injection 20 mg     furosemide (LASIX) injection 80 mg     HOLD nitroGLYcerin IF     iron sucrose (VENOFER) 200 mg in sodium chloride 0.9 % 110 mL intermittent infusion     lidocaine (LMX4) cream     lidocaine 1 % 0.1-1 mL     medication instruction     melatonin tablet 3 mg     methylPREDNISolone sodium succinate (solu-MEDROL) injection 125 mg     polyethylene glycol (MIRALAX) Packet 17 g     polyethylene glycol 400 (BLINK TEARS) 0.25 % ophthalmic solution SOLN 1 drop - PT SUPPLIED     Reason beta blocker not prescribed     sacubitril-valsartan (ENTRESTO) 24-26 MG per tablet 1 tablet     senna-docusate (SENOKOT-S/PERICOLACE) 8.6-50 MG per tablet 1 tablet    Or     senna-docusate (SENOKOT-S/PERICOLACE) 8.6-50 MG per tablet 2 tablet     senna-docusate (SENOKOT-S/PERICOLACE)  8.6-50 MG per tablet 3 tablet     sodium chloride (PF) 0.9% PF flush 3 mL     sodium chloride (PF) 0.9% PF flush 3 mL              Allergies:     Allergies   Allergen Reactions     Pneumococcal Polysaccharides Other (See Comments)          Labs:     Recent Results (from the past 48 hour(s))   Basic metabolic panel    Collection Time: 11/02/21  3:50 PM   Result Value Ref Range    Sodium 135 133 - 144 mmol/L    Potassium 3.3 (L) 3.4 - 5.3 mmol/L    Chloride 99 94 - 109 mmol/L    Carbon Dioxide (CO2) 29 20 - 32 mmol/L    Anion Gap 7 3 - 14 mmol/L    Urea Nitrogen 17 7 - 30 mg/dL    Creatinine 1.27 (H) 0.66 - 1.25 mg/dL    Calcium 8.1 (L) 8.5 - 10.1 mg/dL    Glucose 122 (H) 70 - 99 mg/dL    GFR Estimate 64 >60 mL/min/1.73m2   Potassium    Collection Time: 11/02/21  8:32 PM   Result Value Ref Range    Potassium 4.2 3.4 - 5.3 mmol/L   Blood gas venous with oxyhemoglobin    Collection Time: 11/02/21  9:57 PM   Result Value Ref Range    pH Venous 7.45 (H) 7.32 - 7.43    pCO2 Venous 45 40 - 50 mm Hg    pO2 Venous 31 25 - 47 mm Hg    Bicarbonate Venous 31 (H) 21 - 28 mmol/L    FIO2 21     Oxyhemoglobin Venous 55 (L) 70 - 75 %    Base Excess/Deficit (+/-) 6.3 (H) -7.7 - 1.9 mmol/L   Blood gas venous with oxyhemoglobin    Collection Time: 11/03/21 12:30 AM   Result Value Ref Range    pH Venous 7.44 (H) 7.32 - 7.43    pCO2 Venous 44 40 - 50 mm Hg    pO2 Venous 25 25 - 47 mm Hg    Bicarbonate Venous 30 (H) 21 - 28 mmol/L    FIO2 21     Oxyhemoglobin Venous 40 (L) 70 - 75 %    Base Excess/Deficit (+/-) 5.6 (H) -7.7 - 1.9 mmol/L   Magnesium    Collection Time: 11/03/21  3:39 AM   Result Value Ref Range    Magnesium 2.3 1.6 - 2.3 mg/dL   Basic metabolic panel    Collection Time: 11/03/21  3:39 AM   Result Value Ref Range    Sodium 132 (L) 133 - 144 mmol/L    Potassium 3.8 3.4 - 5.3 mmol/L    Chloride 100 94 - 109 mmol/L    Carbon Dioxide (CO2) 26 20 - 32 mmol/L    Anion Gap 6 3 - 14 mmol/L    Urea Nitrogen 15 7 - 30 mg/dL     Creatinine 1.26 (H) 0.66 - 1.25 mg/dL    Calcium 8.2 (L) 8.5 - 10.1 mg/dL    Glucose 93 70 - 99 mg/dL    GFR Estimate 64 >60 mL/min/1.73m2   Lactic acid whole blood    Collection Time: 11/03/21  3:39 AM   Result Value Ref Range    Lactic Acid 1.5 0.7 - 2.0 mmol/L   INR    Collection Time: 11/03/21  3:39 AM   Result Value Ref Range    INR 1.41 (H) 0.85 - 1.15   Comprehensive metabolic panel    Collection Time: 11/03/21  3:39 AM   Result Value Ref Range    Sodium 132 (L) 133 - 144 mmol/L    Potassium 3.8 3.4 - 5.3 mmol/L    Chloride 100 94 - 109 mmol/L    Carbon Dioxide (CO2) 26 20 - 32 mmol/L    Anion Gap 6 3 - 14 mmol/L    Urea Nitrogen 15 7 - 30 mg/dL    Creatinine 1.26 (H) 0.66 - 1.25 mg/dL    Calcium 8.2 (L) 8.5 - 10.1 mg/dL    Glucose 93 70 - 99 mg/dL    Alkaline Phosphatase 103 40 - 150 U/L    AST 13 0 - 45 U/L    ALT 25 0 - 70 U/L    Protein Total 5.9 (L) 6.8 - 8.8 g/dL    Albumin 2.9 (L) 3.4 - 5.0 g/dL    Bilirubin Total 1.0 0.2 - 1.3 mg/dL    GFR Estimate 64 >60 mL/min/1.73m2   CBC with platelets    Collection Time: 11/03/21  3:39 AM   Result Value Ref Range    WBC Count 9.4 4.0 - 11.0 10e3/uL    RBC Count 4.04 (L) 4.40 - 5.90 10e6/uL    Hemoglobin 9.2 (L) 13.3 - 17.7 g/dL    Hematocrit 30.1 (L) 40.0 - 53.0 %    MCV 75 (L) 78 - 100 fL    MCH 22.8 (L) 26.5 - 33.0 pg    MCHC 30.6 (L) 31.5 - 36.5 g/dL    RDW 18.7 (H) 10.0 - 15.0 %    Platelet Count 259 150 - 450 10e3/uL   Iron and iron binding capacity    Collection Time: 11/03/21  3:39 AM   Result Value Ref Range    Iron 15 (L) 35 - 180 ug/dL    Iron Binding Capacity 502 (H) 240 - 430 ug/dL    Iron Sat Index 3 (L) 15 - 46 %   Ferritin    Collection Time: 11/03/21  3:39 AM   Result Value Ref Range    Ferritin 24 (L) 26 - 388 ng/mL   Blood gas venous with oxyhemoglobin    Collection Time: 11/03/21  4:23 AM   Result Value Ref Range    pH Venous 7.40 7.32 - 7.43    pCO2 Venous 46 40 - 50 mm Hg    pO2 Venous 21 (L) 25 - 47 mm Hg    Bicarbonate Venous 29 (H) 21 - 28  mmol/L    FIO2 21     Oxyhemoglobin Venous 28 (L) 70 - 75 %    Base Excess/Deficit (+/-) 3.5 (H) -7.7 - 1.9 mmol/L   Blood gas venous with oxyhemoglobin    Collection Time: 11/03/21  6:02 AM   Result Value Ref Range    pH Venous 7.43 7.32 - 7.43    pCO2 Venous 43 40 - 50 mm Hg    pO2 Venous 31 25 - 47 mm Hg    Bicarbonate Venous 28 21 - 28 mmol/L    FIO2 21     Oxyhemoglobin Venous 55 (L) 70 - 75 %    Base Excess/Deficit (+/-) 3.5 (H) -7.7 - 1.9 mmol/L   Basic metabolic panel    Collection Time: 11/03/21  5:12 PM   Result Value Ref Range    Sodium 134 133 - 144 mmol/L    Potassium 3.5 3.4 - 5.3 mmol/L    Chloride 99 94 - 109 mmol/L    Carbon Dioxide (CO2) 27 20 - 32 mmol/L    Anion Gap 8 3 - 14 mmol/L    Urea Nitrogen 14 7 - 30 mg/dL    Creatinine 1.22 0.66 - 1.25 mg/dL    Calcium 8.5 8.5 - 10.1 mg/dL    Glucose 116 (H) 70 - 99 mg/dL    GFR Estimate 67 >60 mL/min/1.73m2   Basic metabolic panel    Collection Time: 11/04/21  4:44 AM   Result Value Ref Range    Sodium 136 133 - 144 mmol/L    Potassium 3.3 (L) 3.4 - 5.3 mmol/L    Chloride 101 94 - 109 mmol/L    Carbon Dioxide (CO2) 26 20 - 32 mmol/L    Anion Gap 9 3 - 14 mmol/L    Urea Nitrogen 14 7 - 30 mg/dL    Creatinine 1.21 0.66 - 1.25 mg/dL    Calcium 8.6 8.5 - 10.1 mg/dL    Glucose 70 70 - 99 mg/dL    GFR Estimate 67 >60 mL/min/1.73m2   Blood gas venous with oxyhemoglobin    Collection Time: 11/04/21  4:44 AM   Result Value Ref Range    pH Venous 7.44 (H) 7.32 - 7.43    pCO2 Venous 42 40 - 50 mm Hg    pO2 Venous 39 25 - 47 mm Hg    Bicarbonate Venous 28 21 - 28 mmol/L    FIO2 2     Oxyhemoglobin Venous 70 70 - 75 %    Base Excess/Deficit (+/-) 3.6 (H) -7.7 - 1.9 mmol/L   INR    Collection Time: 11/04/21  4:44 AM   Result Value Ref Range    INR 1.33 (H) 0.85 - 1.15   Magnesium    Collection Time: 11/04/21  4:44 AM   Result Value Ref Range    Magnesium 2.2 1.6 - 2.3 mg/dL   Comprehensive metabolic panel    Collection Time: 11/04/21  4:44 AM   Result Value Ref Range  "   Sodium 136 133 - 144 mmol/L    Potassium 3.3 (L) 3.4 - 5.3 mmol/L    Chloride 101 94 - 109 mmol/L    Carbon Dioxide (CO2) 26 20 - 32 mmol/L    Anion Gap 9 3 - 14 mmol/L    Urea Nitrogen 14 7 - 30 mg/dL    Creatinine 1.21 0.66 - 1.25 mg/dL    Calcium 8.6 8.5 - 10.1 mg/dL    Glucose 70 70 - 99 mg/dL    Alkaline Phosphatase 110 40 - 150 U/L    AST 27 0 - 45 U/L    ALT 25 0 - 70 U/L    Protein Total 6.2 (L) 6.8 - 8.8 g/dL    Albumin 3.0 (L) 3.4 - 5.0 g/dL    Bilirubin Total 1.0 0.2 - 1.3 mg/dL    GFR Estimate 67 >60 mL/min/1.73m2   CBC with platelets    Collection Time: 11/04/21  4:44 AM   Result Value Ref Range    WBC Count 8.3 4.0 - 11.0 10e3/uL    RBC Count 4.06 (L) 4.40 - 5.90 10e6/uL    Hemoglobin 9.2 (L) 13.3 - 17.7 g/dL    Hematocrit 30.2 (L) 40.0 - 53.0 %    MCV 74 (L) 78 - 100 fL    MCH 22.7 (L) 26.5 - 33.0 pg    MCHC 30.5 (L) 31.5 - 36.5 g/dL    RDW 18.9 (H) 10.0 - 15.0 %    Platelet Count 276 150 - 450 10e3/uL   Blood gas venous with oxyhemoglobin    Collection Time: 11/04/21 10:02 AM   Result Value Ref Range    pH Venous 7.42 7.32 - 7.43    pCO2 Venous 44 40 - 50 mm Hg    pO2 Venous 25 25 - 47 mm Hg    Bicarbonate Venous 29 (H) 21 - 28 mmol/L    FIO2 21     Oxyhemoglobin Venous 39 (L) 70 - 75 %    Base Excess/Deficit (+/-) 3.7 (H) -7.7 - 1.9 mmol/L          Physical and Psychiatric Examination:     /83 (BP Location: Left arm)   Pulse 109   Temp 98.8  F (37.1  C) (Oral)   Resp 16   Ht 1.854 m (6' 1\")   Wt 95.5 kg (210 lb 8.6 oz)   SpO2 100%   BMI 27.78 kg/m    Weight is 210 lbs 8.63 oz  Body mass index is 27.78 kg/m .    Physical Exam:  I have reviewed the physical exam as documented by by the medical team and agree with findings and assessment and have no additional findings to add at this time.    Mental Status Exam:  Sitting up in bedside table, busy cleaning glasses. He is well groomed, pleasant, cooperative, a bit restless. Speech fluent. Moves upper extremities without difficulty. " "Associations tight. Mood is \"fair.\"  Affect slightly restricted. Thought process circumstantial.  Thought content negative for suicidal thoughts or delusions, but may have a bit of paranoia. Orientation, recent and remote memory, attention span and concentration are not formally assessed. Fund of knowledge, use of language appropriate. Insight and judgment fair.              DSM-5 Diagnosis:   296.31 (F33.0) Major Depressive Disorder, Recurrent Episode, Mild _  300.00 (F41.9) Unspecified Anxiety Disorder   Unspecified schizophrenia spectrum.   Polysubstance dependence (DSM IV terminology), in partial remission.           Assessment:   He does have some symptoms of anxiety and depression that should be addressed.  He also has some paranoia but this may have been precipitated by his extensive drug use about 5 years ago.  It is quite mild in my opinion and does not at this point justify use of antipsychotic.  However, if it becomes more pronounced or starts interfering with his functioning, we could consider low-dose antipsychotic.  It appears that he would benefit from spending some time with one of our health psychologists to process some of his interpersonal and health stresses.  It appears that he will not be worked up for heart transplant due to his recent methamphetamine use.           Summary of Recommendations:   Consider starting Lexapro 10 mg/day and hydroxyzine 25 to 50 mg at bedtime, along with 3 mg of melatonin around dinnertime.  I would avoid trazodone for sleep since it appears that this did not work well for him while he was at [facility]  I took the liberty of ordering a health psychology consult.  Page me or re-consult psychiatry as needed.     Osei Valdes M.D.   [facility]   Contact information available via [facility] Paging/Directory.  If I am not available, then [facility] intake ([phone]) should know who   Is on call          \"This dictation was " "performed with voice recognition software and may contain errors,  omissions and inadvertent word substitution.\"           "

## 2021-11-05 NOTE — PROGRESS NOTES
[facility]    Cardiology Progress Note- Cards 2        Date of Admission:  11/1/2021     Assessment & Plan: HVSL   Kavon Banerjee is a 56 year old male with PMH of polysubstance abuse (EtOH, methamphetamine), HFrEF 2/2 NICM (last EF 10%) and gout who presented with acute hypoxic respiratory failure and ambulatory cardiogenic shock    Changes Today (11/5)  -continue dobutamine; start lasix gtt with bolus  -continue entresto    Neuro  No acute concerns    CV  #HFrEF 2/2 NICM (last EF 5-10%), NYHA IIIb with acute exacerbation  #Ambulatory cardiogenic shock  Patient with known h/o NICM thought 2/2 polysubstance abuse (meth, EtOH) previously living in [place] where he reported feeling well until about 2 months ago, after which time he has progressive exertional intolerance, PND/orthopnea, abdominal distension and edema. Hospitalized At [facility] for inotropes and diuresis and discharged on 11/1, but presented again evening of 11/2 with recurrence of symptoms after rapid wean of inotrope therapy. Would favor presentation is likely due to progressive underlying disease, ongoing meth use (last used 1 month ago), non-optimized medical therapy, and non-adherence to diet and fluid restriction. Negative coronary angiogram on 10/26/21. Started on dobutamine and diuretics with robust UOP. Will work on optimizing fluid status and implementing GDMT. He is not currently a candidate for advanced therapies due to recent meth use (1 month ago) and would have concerns regarding home inotrope therapy in setting of this at this time.  -Inotropes: attempt wean of dobutamine to 5mcg/kg/min today; follow SVO2, labs  -BB: hold given inotrope use  -ACEi/ARB/ARNI: start entresto 24/26  -Volume status: hypervolemic; continue lasix 80mg BID  -Lefty Ant: not on PTA  -SGLT2: not on PTA  -SCD ppx: not in place  -strict I/O, daily weights  -will need CORE referral  -plan for RHC today    Resp  No  "acute issues    GI  No acute issues    FEN/Renal  #PARUL, resolved  In setting of volume overload. Improving with diuretics  -trend BMP    #Hyponatremia, resolved  In setting of volume overload  -trend with diuretics    ID  No acute concerns    Endo  #T2DM  Based on elevated A1C  -trend BG    MSK/Rheum  #Gout  -continue colchicine, allopurinol    Heme/Onc  #Iron deficiency anemia  Based on low iron and ferritin. No evidence GIB  -IV iron ordered    Psych  #Polysubstance abuse  Noted history of EtOH abuse as well as prior polysubstance abuse (reports prior use of cocaine, methamphetamine, mushrooms, LSD) with most recent meth use 1 month ago. Notes he feels he has EtOH under control, but does still \"sip\" on liquor at times when out with friends. Has not done chemical dependency treatment recently  -continue reiteration regarding abstinence from substances  -will need to complete chemical dependency program to be considered for advanced therapies in future    #Mental health concerns  #Anxiety  #depression  #Insomnia  Noted to be making paranoid statements overnight and expressing feelings of anxiety. Evaluated by psychiatry and started on medications  -psychiatry consulted--> start lexapro; did not tolerate hydroxyzine, melatonin  -will trial ramelteon    Diet:   2 gram sodium  DVT Prophylaxis: Low Risk/Ambulatory with no VTE prophylaxis indicated  [place] Catheter: Not present  Code Status:   DNR/DNI      Disposition Plan   Expected discharge: 4 - 7 days, recommended to prior living arrangement once fluid volume status optimized on oral medication.      Entered: Bridget Hope MD 11/05/2021, 7:07 AM       The patient's care was discussed with the Attending Physician, Dr. Rogel, Bedside Nurse and Patient.    Bridget Hope MD  [facility]  Please see sign in/sign out for up to date coverage " "information  ______________________________________________________________________    Interval History   Nursing notes reviewed. Some ongoing anxiety and restlessness overnight. No chest pain SOB, abdominal pain, N/V. Notes some wrist pain that he feels like is a change in one of his chronic tophi.     Data reviewed today: I reviewed all medications, new labs and imaging results over the last 24 hours. I personally reviewed no imaging or EKG's to review for today.     Physical Exam   Vital Signs: Temp: 98.2  F (36.8  C) Temp src: Axillary BP: 103/66 Pulse: 99   Resp: 16 SpO2: 95 % O2 Device: None (Room air)    Weight: 210 lbs 8.63 oz  GENERAL APPEARANCE: well-developed, well-appearing male in NAD, polite and conversational  HEENT: at/nc, eomi, mmm, sclera anicteric  NECK: no adenopathy, thyroid normal to palpation, JVP elevated to angle of jaw at 70 degrees  RESPIRATORY: CTAB on RA, nonlabored, no accessory muscle use  CARDIOVASCULAR: rrr, s1, s2, no murmur, rub or gallop, no s3 or s4  GI: soft, slightly distended, nt, bs+  EXTREMITIES:right wrist with gout tophi present otherwise no gross deformity, adequate muscle bulk, 1+ BLE edema  NEURO: alert, interactive, speech fluent, grossly nonfocal, moving all 4  SKIN: no ecchymoses, no rashes on exposed skin  PSYCH: \"good\" mood, appropriate affect    Data   Recent Labs   Lab 11/05/21  0505 11/04/21  1859 11/04/21  1858 11/04/21  1229 11/04/21  0444 11/04/21  0444 11/03/21  1712 11/03/21  0339 11/02/21  0022 11/01/21  2256 11/01/21  2252   WBC 7.0  --   --   --   --  8.3  --  9.4   < > 10.2  --    HGB 9.3* 9.7* 10.0*  --   --  9.2*  --  9.2*   < > 9.9*   < >   MCV 75*  --   --   --   --  74*  --  75*   < > 74*  --      --   --   --   --  276  --  259   < > 315  --    INR 1.45*  --   --   --   --  1.33*  --  1.41*   < > 1.44*  --      137  --   --  136  --  136  136   < > 132*  132*   < > 125*  --    POTASSIUM 3.7  3.7  --   --  3.6  --  3.3*  3.3*   < " > 3.8  3.8   < > 4.5  --    CHLORIDE 103  103  --   --  101  --  101  101   < > 100  100   < > 93*  --    CO2 28  28  --   --  27  --  26  26   < > 26  26   < > 22  --    BUN 14  14  --   --  14  --  14  14   < > 15  15   < > 21  --    CR 1.23  1.23  --   --  1.18  --  1.21  1.21   < > 1.26*  1.26*   < > 1.47*  --    ANIONGAP 6  6  --   --  8  --  9  9   < > 6  6   < > 10  --    JESSICA 8.5  8.5  --   --  8.9  --  8.6  8.6   < > 8.2*  8.2*   < > 8.5  --    GLC 77  77  --   --  81  --  70  70   < > 93  93   < > 105*  --    ALBUMIN 2.7*  --   --  3.1*   < > 3.0*  --  2.9*   < > 2.9*  --    PROTTOTAL 6.0*  --   --  6.7*   < > 6.2*  --  5.9*   < > 6.2*  --    BILITOTAL 1.0  --   --  1.1   < > 1.0  --  1.0   < > 1.4*  --    ALKPHOS 102  --   --  119   < > 110  --  103   < > 107  --    ALT 21  --   --  26   < > 25  --  25   < > 32  --    AST 17  --   --  20   < > 27  --  13   < > 22  --    TROPONIN  --   --   --   --   --   --   --   --   --  0.027  --     < > = values in this interval not displayed.     Recent Results (from the past 24 hour(s))   Cardiac Catheterization    Narrative      Right sided filling pressures are mildly elevated.    Moderately elevated pulmonary artery hypertension.    Left sided filling pressures are moderately elevated.    Reduced cardiac output level.     Elevated left and right sided filling pressures with moderate Group II   Pulmonary Hypertension.     XR Chest Port 1 View    Narrative    Exam: XR CHEST PORT 1 VIEW, 11/4/2021 8:56 PM    Comparison: Chest x-ray 11/2/2021    History: swan placement    Findings:  A single portable AP supine view of the chest is obtained. [place]-Livan  catheter tip terminates over the main pulmonary artery. Right internal  jugular central venous catheter tip terminates in the high superior  vena cava.    Trachea is midline. Mediastinum is within normal limits. Unchanged  cardiomegaly. No acute airspace disease. There is no pneumothorax or  pleural  effusion. The upper abdomen is unremarkable.      Impression    Impression:   1. Left internal jugular [place]-Livan catheter tip terminates over the  main pulmonary artery.  2. No acute airspace disease.  3. Unchanged cardiomegaly.    I have personally reviewed the examination and initial interpretation  and I agree with the findings.    ZAC FLOWERS MD         SYSTEM ID:  [id number]     Medications     DOBUTamine 5 mcg/kg/min (11/04/21 2139)     furosemide       - MEDICATION INSTRUCTIONS -       BETA BLOCKER NOT PRESCRIBED         allopurinol  300 mg Oral Daily     colchicine  0.6 mg Oral Daily     escitalopram  10 mg Oral At Bedtime     furosemide  80 mg Intravenous Once     iron sucrose (VENOFER) intermittent infusion (200 mg)  200 mg Intravenous Q24H     polyethylene glycol 400  1 drop Both Eyes 4x Daily     potassium chloride  20 mEq Oral Once     sacubitril-valsartan  1 tablet Oral BID     sodium chloride (PF)  3 mL Intracatheter Q8H

## 2021-11-05 NOTE — PLAN OF CARE
Major Shift Events:   Neuro: Intact. Make all needs known. SBA due to lines.   Cards: Sinus tach with PVC's. BP's soft 80's-90's/60's. [place] in place and FICKs q6hrs. CVP 14 and PA 54/38. Dobutamine @ 5 and Lasix @ 20. Afebrile. 1600 K+ replaced w/ 40, recheck at 2200.   Pulm: RA  GI/: Voiding large amounts. No BM today. 2L FR and 2g Na diet.   Plan: Continue to [facility].   For vital signs and complete assessments, please see documentation flowsheets.

## 2021-11-06 NOTE — PROGRESS NOTES
[facility]    Cardiology Progress Note- Cards 2        Date of Admission:  11/1/2021     Assessment & Plan: HVSL   Kavon Banerjee is a 56 year old male with PMH of polysubstance abuse (EtOH, methamphetamine), HFrEF 2/2 NICM (last EF 10%) and gout who presented with acute hypoxic respiratory failure and ambulatory cardiogenic shock    Changes Today (11/5)  -continue dobutamine; decrease lasix gtt to 10mg/hr  -continue entresto  -remove [place]    Neuro  No acute concerns    CV  #HFrEF 2/2 NICM (last EF 5-10%), NYHA IIIb with acute exacerbation  #Ambulatory cardiogenic shock  Patient with known h/o NICM thought 2/2 polysubstance abuse (meth, EtOH) previously living in [place] where he reported feeling well until about 2 months ago, after which time he has progressive exertional intolerance, PND/orthopnea, abdominal distension and edema. Hospitalized At [facility] for inotropes and diuresis and discharged on 11/1, but presented again evening of 11/2 with recurrence of symptoms after rapid wean of inotrope therapy. Would favor presentation is likely due to progressive underlying disease, ongoing meth use (last used 1 month ago), non-optimized medical therapy, and non-adherence to diet and fluid restriction. Negative coronary angiogram on 10/26/21. Started on dobutamine and diuretics with robust UOP. Will work on optimizing fluid status and implementing GDMT. He is not currently a candidate for advanced therapies due to recent meth use (1 month ago) and would have concerns regarding home inotrope therapy in setting of this at this time.  -Inotropes: continue dobutamine 5mcg/kg/min today; follow SVO2, labs  -BB: hold given inotrope use  -ACEi/ARB/ARNI: continue entresto 24/26  -Volume status: hypervolemic; decrease lasix gtt to 10mg/hr; may stop this afternoon pending CVP  -Lefty Ant: not on PTA  -SGLT2: not on PTA  -SCD ppx: not in place  -strict I/O, daily weights  -will  "need CORE referral    Resp  No acute issues    GI  No acute issues    FEN/Renal  #PARUL, resolved  In setting of volume overload. Resolved with diuretics  -trend BMP    #Hyponatremia, resolved  In setting of volume overload  -trend with diuretics    ID  No acute concerns    Endo  #T2DM  Based on elevated A1C  -trend BG    MSK/Rheum  #Gout  -continue colchicine, allopurinol    Heme/Onc  #Iron deficiency anemia  Based on low iron and ferritin. No evidence GIB  -IV iron ordered    Psych  #Polysubstance abuse  Noted history of EtOH abuse as well as prior polysubstance abuse (reports prior use of cocaine, methamphetamine, mushrooms, LSD) with most recent meth use 1 month ago. Notes he feels he has EtOH under control, but does still \"sip\" on liquor at times when out with friends. Has not done chemical dependency treatment recently  -continue reiteration regarding abstinence from substances  -will need to complete chemical dependency program to be considered for advanced therapies in future    #Mental health concerns  #Anxiety  #depression  #Insomnia  Noted to be making paranoid statements overnight and expressing feelings of anxiety. Evaluated by psychiatry and started on medications  -psychiatry consulted--> start lexapro; did not tolerate hydroxyzine, melatonin  -will trial ramelteon    Diet:   2 gram sodium  DVT Prophylaxis: Low Risk/Ambulatory with no VTE prophylaxis indicated  Pendleton Catheter: Not present  Code Status:   DNR/DNI      Disposition Plan   Expected discharge: 4 - 7 days, recommended to prior living arrangement once fluid volume status optimized on oral medication.      Entered: Bridget Hope MD 11/06/2021, 6:59 AM       The patient's care was discussed with the Attending Physician, Dr. Rogel, Bedside Nurse and Patient.    Bridget Hope MD  [facility]  Please see sign in/sign out for up to date coverage " "information  ______________________________________________________________________    Interval History   Nursing notes reviewed. ZEINAB. Feeling well today. Slept better overnight. No chest pain, SOB, abdominal pain, nausea. Feels like his swelling overall is much better    Data reviewed today: I reviewed all medications, new labs and imaging results over the last 24 hours. I personally reviewed no imaging or EKG's to review for today.     Physical Exam   Vital Signs: Temp: 97.9  F (36.6  C) Temp src: Oral BP: 95/66 Pulse: 92   Resp: 18 SpO2: 98 % O2 Device: None (Room air)    Weight: 195 lbs 12.3 oz  GENERAL APPEARANCE: well-developed, well-appearing male in NAD, polite and conversational  HEENT: at/nc, eomi, mmm, sclera anicteric  NECK: no adenopathy, thyroid normal to palpation, JVD to 2cm above clavicle at 70 degreess  RESPIRATORY: CTAB on RA, nonlabored, no accessory muscle use  CARDIOVASCULAR: rrr, s1, s2, no murmur, rub or gallop, no s3 or s4  GI: soft, slightly distended, nt, bs+  EXTREMITIES:right wrist with gout tophi present otherwise no gross deformity, adequate muscle bulk, trace BLE edema  NEURO: alert, interactive, speech fluent, grossly nonfocal, moving all 4  SKIN: no ecchymoses, no rashes on exposed skin  PSYCH: \"good\" mood, appropriate affect    Data   Recent Labs   Lab 11/06/21  0535 11/06/21  0531 11/05/21  2348 11/05/21  2345 11/05/21  1551 11/05/21  0505 11/05/21  0505 11/04/21  1859 11/04/21  1229 11/04/21  0444 11/02/21  0125 11/02/21  0022 11/01/21  2256   WBC 9.7  --   --   --   --   --  7.0  --   --  8.3  --    < > 10.2   HGB 10.4*  --   --   --   --   --  9.3* 9.7*   < > 9.2*  --    < > 9.9*   MCV 75*  --   --   --   --   --  75*  --   --  74*  --    < > 74*     --   --   --   --   --  305  --   --  276  --    < > 315   INR 1.29*  --   --   --   --   --  1.45*  --   --  1.33*  --    < > 1.44*     --   --   --  137  --  137  137  --    < > 136  136  --    < > 125*   POTASSIUM " 3.6  --   --  3.7 3.2*   < > 3.7  3.7  --    < > 3.3*  3.3*  --    < > 4.5   CHLORIDE 100  --   --   --  101  --  103  103  --    < > 101  101  --    < > 93*   CO2 27  --   --   --  28  --  28  28  --    < > 26  26  --    < > 22   BUN 9  --   --   --  12  --  14  14  --    < > 14  14  --    < > 21   CR 1.04  --   --   --  1.09  --  1.23  1.23  --    < > 1.21  1.21  --    < > 1.47*   ANIONGAP 9  --   --   --  8  --  6  6  --    < > 9  9  --    < > 10   JESSICA 8.4*  --   --   --  8.1*  --  8.5  8.5  --    < > 8.6  8.6  --    < > 8.5   * 131* 86  --  138*   < > 77  77  --    < > 70  70   < >   < > 105*   ALBUMIN 2.8*  --   --   --   --   --  2.7*  --    < > 3.0*  --    < > 2.9*   PROTTOTAL 6.3*  --   --   --   --   --  6.0*  --    < > 6.2*  --    < > 6.2*   BILITOTAL 0.8  --   --   --   --   --  1.0  --    < > 1.0  --    < > 1.4*   ALKPHOS 123  --   --   --   --   --  102  --    < > 110  --    < > 107   ALT 31  --   --   --   --   --  21  --    < > 25  --    < > 32   AST 24  --   --   --   --   --  17  --    < > 27  --    < > 22   TROPONIN  --   --   --   --   --   --   --   --   --   --   --   --  0.027    < > = values in this interval not displayed.     No results found for this or any previous visit (from the past 24 hour(s)).  Medications     DOBUTamine 5 mcg/kg/min (11/06/21 0700)     furosemide 20 mg/hr (11/06/21 0700)     - MEDICATION INSTRUCTIONS -       BETA BLOCKER NOT PRESCRIBED         allopurinol  300 mg Oral Daily     colchicine  0.6 mg Oral Daily     escitalopram  10 mg Oral At Bedtime     iron sucrose (VENOFER) intermittent infusion (200 mg)  200 mg Intravenous Q24H     polyethylene glycol 400  1 drop Both Eyes 4x Daily     potassium chloride  20 mEq Oral Once     potassium chloride  40 mEq Oral BID     ramelteon  8 mg Oral QPM     sacubitril-valsartan  1 tablet Oral BID     sodium chloride (PF)  3 mL Intracatheter Q8H

## 2021-11-06 NOTE — PLAN OF CARE
Major Shift Events:   Neuro: Intact. Make all needs known. SBA due to lines.   Cards: Sinus tach with PVC's. BP's soft 80's-90's/60's. [place] removed today. Dobutamine @ 5 and Lasix gtt discontinued this afternoon. Afebrile.   Pulm: RA  GI/: Voiding large amounts. BM x1 today. 2L FR and 2g Na diet.   [facility]   For vital signs and complete assessments, please see documentation flowsheets.

## 2021-11-06 NOTE — PLAN OF CARE
Major Shift Events: Patient is alert and oriented X4, call light appropriate, pupils are reactive and equal. On room air sating >92%. Sinus rhythm with PVC's. [place] completed X1, Line clotted at 0600, MD aware, CVP charted, unable to complete full [place]. Maintained MAP >65, afebrile. See flowsheet for accurate intake and output. Up to the bathroom X1, had 1 large BM. Uses urinal at bedside with good urine output.   Plan: Continue with the plan of cares.   For vital signs and complete assessments, please see documentation flowsheets.

## 2021-11-07 NOTE — PLAN OF CARE
4A - Per chart review and discussion with RN and Occupational Therapy, Pt ambulating IND, no LOB per report, has been able to transfer in room IND. Plan for OT to continue to follow to address cardiac rehab/education and functional mobility as needed. Pt with no skilled inpatient PT needs, Will complete consult and defer evaluation, please reconsult as appropriate if patient has decline in functional mobility requiring further skilled inpatient PT needs.

## 2021-11-07 NOTE — PROGRESS NOTES
[facility]    Cardiology Progress Note- Cards 2        Date of Admission:  11/1/2021     Assessment & Plan: HVSL    Kavon Banerjee is a 56 year old male with PMH of polysubstance abuse (EtOH, methamphetamine), HFrEF 2/2 NICM (last EF 10%) and gout who presented with acute hypoxic respiratory failure and ambulatory cardiogenic shock     Changes Today (11/5)  -resume PTA lasix 40mg PO BID  -attempt to wean dobutamine  -continue entresto  -transfer out of ICU     CV  #Acute on chronic systolic heart failure 2/2 NICM (last EF 5-10%), NYHA IIIb  #Ambulatory cardiogenic shock  Patient with known h/o NICM thought 2/2 polysubstance abuse (meth, EtOH) previously living in [place] where he reported feeling well until about 2 months ago, after which time he has progressive exertional intolerance, PND/orthopnea, abdominal distension and edema. Hospitalized At [facility] for inotropes and diuresis and discharged on 11/1, but presented again evening of 11/2 with recurrence of symptoms after rapid wean of inotrope therapy. Would favor presentation is likely due to progressive underlying disease, ongoing meth use (last used 1 month ago), non-optimized medical therapy, and non-adherence to diet and fluid restriction. Negative coronary angiogram on 10/26/21. Started on dobutamine and diuretics with robust UOP. Will work on optimizing fluid status and implementing GDMT. He is not currently a candidate for advanced therapies due to recent meth use (1 month ago) and would have concerns regarding home inotrope therapy in setting of this at this time.  -Inotropes: attempt to wean dobutamine in stepwise fashion; follow SVO2, labs  -BB: hold given inotrope use  -ACEi/ARB/ARNI: continue entresto 24/26  -Volume status: near euvolemic; resume PTA lasix 40mg PO BID  -Lefty Ant: not on PTA  -SGLT2: not on PTA  -SCD ppx: not in place  -strict I/O, daily weights  -will need CORE  "referral    FEN/Renal  #PARUL, resolved  In setting of volume overload. Resolved with diuretics  -trend BMP     #Hyponatremia, resolved  In setting of volume overload  -trend with diuretics     Endo  #T2DM  Based on elevated A1C  -trend BG  -has not required insulin     MSK/Rheum  #Gout  -continue colchicine, allopurinol     Heme/Onc  #Iron deficiency anemia  Based on low iron and ferritin. No evidence GIB  -IV iron     Psych  #Polysubstance abuse  Noted history of EtOH abuse as well as prior polysubstance abuse (reports prior use of cocaine, methamphetamine, mushrooms, LSD) with most recent meth use 1 month ago. Notes he feels he has EtOH under control, but does still \"sip\" on liquor at times when out with friends. Has not done chemical dependency treatment recently  -continue reiteration regarding abstinence from substances  -will need to complete chemical dependency program to be considered for advanced therapies in future     #Mental health concerns  #Anxiety  #depression  #Insomnia  Noted to be making paranoid statements overnight and expressing feelings of anxiety. Evaluated by psychiatry and started on medications  -psychiatry consulted--> start lexapro; did not tolerate hydroxyzine, melatonin  -continue ramelteon     Diet:   2 gram sodium; 2L fluid restricted  DVT Prophylaxis: Low Risk/Ambulatory with no VTE prophylaxis indicated  Pendleton Catheter: Not present  Code Status:   DNR/DNI      Disposition Plan   Expected discharge: 2 - 3 days, recommended to prior living arrangement once fluid volume status optimized on oral medication.      Entered: Willem Storey MD 11/07/2021, 7:15 AM       The patient's care was discussed with the Attending Physician, Dr. Rogel, Bedside Nurse and Patient.    Willem Storey MD   Cardiology Fellow   [address]  ______________________________________________________________________    Interval History   No major events " reported overnight. Diuretics held in the evening. Still negative 1.2L. Feeling well this morning without complaints. No significant arrythmias. Renal functio stable. Blood pressure is low, though end-organ function appears unaffected.    Data reviewed today: I reviewed all medications, new labs and imaging results over the last 24 hours.    Physical Exam   Vital Signs: Temp: 97.9  F (36.6  C) Temp src: Oral BP: (!) 87/58 Pulse: 101   Resp: 18 SpO2: 99 % O2 Device: Nasal cannula Oxygen Delivery: 1 LPM  Weight: 194 lbs 14.19 oz  GENERAL APPEARANCE: lying in hospital bed in NAD  HEENT: NCAT, anicteric  NECK: minimal JVD, small scab at previous catheter site  RESPIRATORY: CTAB on RA, nonlabored, no accessory muscle use  CARDIOVASCULAR: RRR, s1/s2, no murmur, rub or gallop  GI: soft, nt, bs+  EXTREMITIES: right wrist with gout tophi present otherwise no gross deformity, trace BLE edema  NEURO: alert, interactive, speech fluent, grossly nonfocal, moving all 4 extremities  SKIN: no ecchymoses, no rashes on exposed skin, previous catheter site is c/d without oozing      Data   Recent Labs   Lab 11/07/21  0506 11/07/21  0011 11/06/21  1625 11/06/21  0535 11/05/21  1551 11/05/21  0505 11/02/21  0022 11/01/21  2256   WBC 8.6  --   --  9.7  --  7.0   < > 10.2   HGB 10.7*  --   --  10.4*  --  9.3*   < > 9.9*   MCV 76*  --   --  75*  --  75*   < > 74*     --   --  298  --  305   < > 315   INR 1.26*  --   --  1.29*  --  1.45*   < > 1.44*     138  --  134 136   < > 137  137   < > 125*   POTASSIUM 4.0  4.0 3.9 3.4 3.6   < > 3.7  3.7   < > 4.5   CHLORIDE 104  104  --  101 100   < > 103  103   < > 93*   CO2 27  27  --  29 27   < > 28  28   < > 22   BUN 8  8  --  9 9   < > 14  14   < > 21   CR 1.11  1.11  --  1.20 1.04   < > 1.23  1.23   < > 1.47*   ANIONGAP 7  7  --  4 9   < > 6  6   < > 10   JESSICA 8.7  8.7  --  8.3* 8.4*   < > 8.5  8.5   < > 8.5   GLC 75  75  --  96 121*   < > 77  77   < > 105*   ALBUMIN  2.7*  --   --  2.8*  --  2.7*   < > 2.9*   PROTTOTAL 6.0*  --   --  6.3*  --  6.0*   < > 6.2*   BILITOTAL 0.7  --   --  0.8  --  1.0   < > 1.4*   ALKPHOS 110  --   --  123  --  102   < > 107   ALT 19  --   --  31  --  21   < > 32   AST 9  --   --  24  --  17   < > 22   TROPONIN  --   --   --   --   --   --   --  0.027    < > = values in this interval not displayed.     No results found for this or any previous visit (from the past 24 hour(s)).  Medications     DOBUTamine 2.5 mcg/kg/min (11/07/21 1200)     - MEDICATION INSTRUCTIONS -       BETA BLOCKER NOT PRESCRIBED         allopurinol  300 mg Oral Daily     colchicine  0.6 mg Oral Daily     escitalopram  10 mg Oral At Bedtime     furosemide  40 mg Oral BID     polyethylene glycol 400  1 drop Both Eyes 4x Daily     potassium chloride  40 mEq Oral BID     ramelteon  8 mg Oral QPM     sacubitril-valsartan  1 tablet Oral BID     sodium chloride (PF)  3 mL Intracatheter Q8H

## 2021-11-07 NOTE — PHARMACY-ADMISSION MEDICATION HISTORY
Admission Medication History Completed by Pharmacy    See [facility] Admission Navigator for allergy information, preferred outpatient pharmacy, prior to admission medications and immunization status.     Medication History Sources:     CareEverywhere-> admission from 10/25    Ashly    Changes made to PTA medication list (reason):    Added: None    Deleted: None    Changed: None    Additional Information:    None    Prior to Admission medications    Medication Sig Last Dose Taking? Auth Provider   allopurinol (ZYLOPRIM) 100 MG tablet Take 300 mg by mouth daily Past Week at Unknown time Yes Reported, Patient   colchicine (COLCYRS) 0.6 MG tablet Take 0.6 mg by mouth daily Past Week at Unknown time Yes Reported, Patient   furosemide (LASIX) 40 MG tablet Take 40 mg by mouth 2 times daily Past Week at Unknown time Yes Reported, Patient   losartan (COZAAR) 25 MG tablet Take 25 mg by mouth 2 times daily Past Week at Unknown time Yes Reported, Patient   polyethylene glycol (MIRALAX) 17 GM/Dose powder Take 17 g by mouth 2 times daily as needed Unknown at Unknown time Yes Reported, Patient   senna (SENOKOT) 8.6 MG tablet Take 1 tablet by mouth 2 times daily Past Week at Unknown time Yes Reported, Patient       Date completed: 11/06/21    Medication history completed by: Ivania Aranda, PharmD

## 2021-11-07 NOTE — PLAN OF CARE
Major Shift Events:  Neuros intact. HR 70s-100s, SR/ST with occasional PVCs. BP soft, maintained MAP > 65 without interventions. Replaced K+. Afebrile. Added 1L NC overnight due to sats at 88% while sleeping, otherwise on RA when awake. Minimal urine output overnight. Slept most of the shift.  Plan: Continue to monitor and assess.   For vital signs and complete assessments, please see documentation flowsheets.

## 2021-11-08 NOTE — CONSULTS
Care Management Initial Consult    General Information  Assessment completed with: Patient,Family, Pt and motherTeresa  Type of CM/SW Visit: Initial Assessment    Primary Care Provider verified and updated as needed: Yes (Pt just moved here, needs to establish a PCP)   Readmission within the last 30 days:        Reason for Consult: discharge planning    Communication Assessment  Patient's communication style: spoken language (English or Bilingual)    Hearing Difficulty or Deaf: no   Wear Glasses or Blind: yes    Cognitive  Cognitive/Neuro/Behavioral: WDL                      Living Environment:   People in home: parent(s)  Teresa lopez  Current living Arrangements: apartment      Able to return to prior arrangements: yes     Family/Social Support:  Care provided by: self  Provides care for:    Marital Status:   Parent(s)          Description of Support System: Supportive,Involved         Current Resources:   Patient receiving home care services: No  Community Resources: None  Equipment currently used at home: none  Supplies currently used at home: None    Employment/Financial:  Financial Concerns: No concerns identified     Functional Status:  Prior to admission patient needed assistance: N/A, pt independent prior to admission.    Chemical Dependency Status: Pt with recent history of polysubstance abuse (alcohol and methamphetamine)      Values/Beliefs:  Spiritual, Cultural Beliefs, Anglican Practices, Values that affect care: no       Additional Information:  This writer met with pt and mother for initial assessment. Pt moved to [place] just a couple weeks ago from [place] to live with his mother and pursue health care workup. Pt states he has not yet establish a PCP here in [place], but would like to establish at a [facility] near his home. Pt is scheduled for CORE f/u on 11/17. Per NP, pt will likely need IV milrinone at discharge as pt didn't tolerate dobutamine weaning trial yesterday. Pt and  mother state they would be willing and able to learn how to administer home infusion. Pt is not homebound so will need to arrange outpatient PICC line cares, which pt's mother would prefer at [facility].     Intervention:  Referral and benefit check sent to [facility] for review.  Appointment scheduled at [facility] on 11/16 at 1:40pm with Dr. Jeevan Foster for establishment of care and post hospitalization follow up.    CC will continue to monitor patient's medical condition and progress towards discharge.  Fior Hawkins RN BSN  6C Unit Care Coordinator  Phone number: [phone]  Pager: [phone]

## 2021-11-08 NOTE — PROGRESS NOTES
[facility]   Cardiology II Service / Advanced Heart Failure  Daily Progress Note  Date of Service: 11/8/2021      Patient: Kavon Banerjee  MRN: [id number]  Admission Date: 11/1/2021  Hospital Day # 7    Assessment and Plan:  Mr. Kavon Banerjee is a 56yr old male with a longstanding history of chronic systolic heart failure secondary to NICM (LVEF < 10%, LVEDD 8.0cm) c/b recent cardiogenic shock (hospitalized at OSH, required inotropic support, left AMA 11/1) and polysubstance abuse (alcohol, meth) who presented to the ER with dizziness and shortness of breath.  He was found to be in decompensated heart failure (NTproBNP 3891, Na 126, lactic 3.8), so was admitted to the ICU and started on dobutamine and norepinephrine.        PLAN:  - PICC line to be placed today  - Once PICC line placed, stop dobutamine and start milrinone @ 0.125mcg/kg/min  - Give lasix 80mg this pm, then increase to 60mg IV BID tomorrow  - CORE consult today  - Rheumatology consult today given long-standing struggles with gout      Chronic systolic heart failure secondary to NICM (LVEF <10% per TTE 11/2/21)  Stage D, NYHA Class III  Has a longstanding history of NICM, thought to be secondary to polysubstance abuse (meth, EtOH).  He has previously been living in [place], where he reported feeling well until about 2 months when he developed progressive exertional intolerance, PND/orthopnea, abdominal distention, edema.  He was recently hospitalized at OSH, where he was found to be in cardiogenic shock requiring inotropic support.  He left AMA on 11/1, but represented to [facility] ED due to progressively worsening symptoms.  His decline was attributed to his inotrope wean, not optimized medical therapy, and nonadherence to diet and fluid restrictions.    - ACEi/ARB/ARNi:  entresto 24-26mg twice daily  - Afterload reduction:  n/a  - BB:  Deferred due to cardiogenic shock (PTA carvedilol 12.5mg twice daily)  - Aldosterone antagonist:  "Deferred while other medications are prioritized.  Has been on spironolactone with good tolerance in the past.  - SGLT2i:  Deferred  - Inotropy: PICC line to be placed today.  Once PICC line placed, will stop dobutamine and start milrinone @ 0.125mcg/kg/min.  - SCD ppx:  n/a  - Fluid status:  Hypervolemic.  Give lasix 80mg this pm, then increase to 60mg IV BID tomorrow.    Polysubstance abuse  History of EtOH abuse as well as prior polysubstance abuse (reports prior use of cocaine, methamphetamine, mushrooms, LSD).  Has had recent meth use within the last month.  He reports that he has his EtOH under control, but does still \"sip\" on liquor at times when out with friends.  Drug screening and PETH were negative on 11/2.  He has not completed formal chemical dependency treatment recently.  He has been informed that he will need to complete a chemical dependency program in order to be considered for advanced heart failure therapies in the future.  Reinforced complete abstinence from substances.    PARUL  Hyponatremia  In the setting of volume overload, has resolved with diuretics.  - trend BMP    DMII  Elevated HgbA1c, has not required insulin.  - continue to monitor    Gout  Reports extremely longstanding history of gout.  Has been taking allopurinol and colchicine prior to admission.  Has painful tophi on his wrist, which he would like removed.  - Rheumatology consult today, appreciate rec's  - continue allopurinol 300mg daily  - continue colchicine 0.6mg daily    Anxiety/depression  Psychiatry consulted 11/4, started on Lexapro 10mg daily.  Note that he did not tolerate hydroxyzine and melatonin.  Continue ramelteon.        FEN: 2g Na restriction and 2L FR  PROPHY: Ambulate  LINES: PICC line to be placed today  DISPO: Pending  CODE STATUS:  DNR/DNI    =============================================================    Interval History/ROS:   Mr. Banerjee states that he feels better today.  He slept better last night.  He " "has been walking around, with no exertional concerns.  His breathing has improved, and he denies shortness of breath, PND, and orthopnea.  His appetite has been good, and he is eating regularly without nausea, vomiting, and diarrhea.  He has a chronic history of constipation, noting that he needs to have an outpatient colonoscopy performed, but states that his bowels have been moving more regularly since his admission here.  He does not feel any fluid retention, noting that his lower extremities look well today.  He otherwise denies chest pain, palpitations, dizziness, headaches, acute vision changes, fevers, chills, cough, sore throat, and signs of bleeding.    Last 24 hr care team notes reviewed.   ROS:  4 point ROS including Respiratory, CV, GI and , other than that noted in the HPI, is negative.     Medications: Reviewed in EPIC.     Physical Exam:   BP 96/76 (BP Location: Right arm)   Pulse 83   Temp 97.7  F (36.5  C) (Oral)   Resp 16   Ht 1.854 m (6' 1\")   Wt 89.1 kg (196 lb 6.4 oz)   SpO2 96%   BMI 25.91 kg/m    GENERAL: Appears alert and oriented times three.  Sitting upright in bed, NAD.  HEENT: Eye symmetrical and free of discharge bilaterally. Mucous membranes moist and without lesions.  NECK: Supple and without lymphadenopathy. JVD to mid neck when lying at 45 .   CV: RRR, S1S2 present without murmur, rub, or gallop.   RESPIRATORY: Respirations regular, even, and unlabored. Lungs CTA throughout.   GI: Soft and non distended with normoactive bowel sounds present in all quadrants. No tenderness, rebound, guarding. No organomegaly.   EXTREMITIES: No peripheral edema. 2+ bilateral pedal pulses.  Right wrist with gout tophi present.  NEUROLOGIC: Alert and orientated x 3. CN II-XII grossly intact. No focal deficits.   MUSCULOSKELETAL: No joint swelling or tenderness.   SKIN: No jaundice. No rashes or lesions.     Data:  CMPRecent Labs   Lab 11/08/21  0527 11/07/21  1715 11/07/21  0506 11/07/21  0011 " 11/06/21  1625 11/06/21  0535 11/05/21  1551 11/05/21  0505    136 138  138  --  134 136   < > 137  137   POTASSIUM 3.9 4.1 4.0  4.0 3.9 3.4 3.6   < > 3.7  3.7   CHLORIDE 103 103 104  104  --  101 100   < > 103  103   CO2 26 26 27  27  --  29 27   < > 28  28   ANIONGAP 6 7 7  7  --  4 9   < > 6  6   * 81 75  75  --  96 121*   < > 77  77   BUN 11 11 8  8  --  9 9   < > 14  14   CR 1.13 1.12 1.11  1.11  --  1.20 1.04   < > 1.23  1.23   GFRESTIMATED 73 74 75  75  --  68 81   < > 66  66   JESSICA 9.0 9.1 8.7  8.7  --  8.3* 8.4*   < > 8.5  8.5   MAG 2.1  --  2.0  --   --  1.9  --  2.1   PROTTOTAL 6.2* 6.9 6.0*  --   --  6.3*  --  6.0*   ALBUMIN 2.8* 3.3* 2.7*  --   --  2.8*  --  2.7*   BILITOTAL 0.7 0.7 0.7  --   --  0.8  --  1.0   ALKPHOS 111 133 110  --   --  123  --  102   AST 15 17 9  --   --  24  --  17   ALT 18 21 19  --   --  31  --  21    < > = values in this interval not displayed.     CBC  Recent Labs   Lab 11/08/21  0527 11/07/21  0506 11/06/21  0535 11/05/21  0505   WBC 8.3 8.6 9.7 7.0   RBC 4.74 4.73 4.62 4.13*   HGB 10.7* 10.7* 10.4* 9.3*   HCT 36.4* 35.9* 34.6* 30.8*   MCV 77* 76* 75* 75*   MCH 22.6* 22.6* 22.5* 22.5*   MCHC 29.4* 29.8* 30.1* 30.2*   RDW 21.0* 20.1* 19.8* 19.3*    321 298 305     INR  Recent Labs   Lab 11/08/21  0527 11/07/21  0506 11/06/21  0535 11/05/21  0505   INR 1.22* 1.26* 1.29* 1.45*       Patient discussed with Dr. Rogel.      Chastity Goldman, NADINE, FNP-BC, CHFN  Advanced Heart Failure Nurse Practitioner  [facility]

## 2021-11-08 NOTE — PROVIDER NOTIFICATION
Pt verbalized that he spoke with psychiatrist a couple of days ago and was told he could be started on something for anxiety/insomnia. Discussed with Cards 2 provider who verbalized he would discuss it with the fellow. Awaiting a plan.

## 2021-11-08 NOTE — CONSULTS
"RHEUMATOLOGY CONSULT NOTE    Kavon Banerjee MRN# [id number]   Age: 56 year old YOB: 1965     Date of Admission:  11/1/2021  Reason for consult: chronic gout     Assessment and Plan:   Discussion: 55 y/o M with chronic gout on allipurinol and colchicine as well as severe non-ischemic cardiomyopathy, currently hospitalized for CHF/cardiogenic shock.     Rheumatology consulted for chronic gout.  Pt wanting to have gouty tophi from R wrist removed.      Treatment for tophaceous gout is typically medication-based.  A ellie review (Giana MK, Herman O, Sisi L, Graciela C, van elisa Heijde DM, Jay CJ. Interventions for tophi in gout: a [place] systematic literature review.) found that \"treatment with urate-lowering therapy such as allopurinol, benzbromarone, allopurinol + benzbromarone in combination, febuxostat, or pegloticase can lead to reduction in tophi. There is some evidence that achieving a lower serum urate level leads to a faster rate of tophi reduction.\"    Currently, he is on allopurinol, but his dose is not maximized.  Another consideration would be pegloticase, which can treat tophi faster than allopurinol.    Because of risk for flares and infection, surgery is not first line.  However, Mr. Banerjee would like to consider surgery since pegloticase can take a year to resolve tophi (and allopurinol alone would take longer) and he doesn't want to wait that long-- he does not believe he has that long to live. In addition, there is a real concern for anaphylaxis with pegloticase administration which may not be tolerated by a patient requiring dobutamine/milrinone infusion. He has also had good outcomes from surgery for tophi in the past.  Given this, we would recommend a hand surgery consult, and would defer to their expertise on whether he is a good surgical candidate.      We will also have him follow up with rheumatology outpt, to establish care here in [place] to manage his gout.  " "    Problem list:  Tophaceous gout    Recommendations:  -- Please consult Surgeryfor large tophus removal evaluation. This may be best done outpatient.  -- No changes to gout medications at this time  -- please contact Rheumatology service prior to patient discharge and we will arrange for outpatient care    The patient was seen and staffed with Dr. Micha Wallace  MS4         Chief Complaint:   Tophus         History of Present Illness:   Kavon Banerjee is a 57 y/o M with complex pmh including chronic gout on allipurinol and colchicine, non-ischemic cardiomyopathy/diastolic cardiomyopathy with EF 10% (baseline 20%) who was admitted 11/02 after presenting with dizziness and SOB.     He had been hospitalized at [facility] for the past 8 days for CHF/cardiogenic shock and had left AMA prior to presentation.  During that hospitalization, he was placed on dobutamine to facilitate diuresis in the setting of hypotension, but this was discontinued when he discharged AMA.  Dobutamine restarted here.  He has only recently moved to [place] from [place].    This hospitalization has wrist pain mentioned that \"feels like is a change in one of his chronic tophi.\"      In terms of his gout, he tells me he was diagnosed in his 20s.  He had several ED visits with local steroid injections before seeing a rheumatologist and being put on medications.  He has been on allopurinol and colchicine for many years.    He has also had gouty tophi, with tophi on his R knee, bilateral elbows, R wrist and even his ear several times.  He has had tophi surgically removed from his bilateral elbows and L knee.  Three years ago, he was going to have a tophus removed from his R hand, but at that time, he was found to have a low EF, and his surgeon wasn't sure if it was a tophus or a ganglion cyst and didn't feel comfortable removing a ganglion cyst with local anesthetic, so posponed the sg.    Currently, his gout is well-controlled in " terms of flares.  He last flared within the last 4 months, but overall feels his medications of daily allopurinol and colchicine as well as PO steroids for flare are working.  He only recently came to [place], and does not have a rheumatologist here in [place].  His question is the tophi on his R wrist.  He has 2 of them, and they have grown in size to the point where they are restricting his ROM.  They are not painful or swollen.  He would like to get it removed.    In terms of risk factors for gout, he is on furosemide for the past 3 years for his heart failure (however, his gout did start long before this).  He denies any kidney dz.    He denies any family hx of gout or any other rheum conditions to his knowledge.      In terms of labs, I do not see any uric acid level.           Past Medical History:   History reviewed. No pertinent past medical history.          Past Surgical History:     Past Surgical History:   Procedure Laterality Date     CV RIGHT HEART CATH MEASUREMENTS RECORDED N/A 11/4/2021    Procedure: Heart Cath Right Heart Cath;  Surgeon: Rome Franklin MD;  Location:  HEART CARDIAC CATH LAB            Social History:     Social History     Socioeconomic History     Marital status:      Spouse name: Not on file     Number of children: Not on file     Years of education: Not on file     Highest education level: Not on file   Occupational History     Not on file   Tobacco Use     Smoking status: Never Smoker     Smokeless tobacco: Never Used   Substance and Sexual Activity     Alcohol use: Not Currently     Comment: used to be a heavy drinker up until 50     Drug use: Not on file     Sexual activity: Not on file   Other Topics Concern     Not on file   Social History Narrative     Not on file     Social Determinants of Health     Financial Resource Strain: Not on file   Food Insecurity: Not on file   Transportation Needs: Not on file   Physical Activity: Not on file   Stress: Not on file   Social  Connections: Not on file   Intimate Partner Violence: Not on file   Housing Stability: Not on file             Family History:   History reviewed. No pertinent family history.          Immunizations:     There is no immunization history on file for this patient.          Allergies:     Allergies   Allergen Reactions     Pneumococcal Polysaccharides Other (See Comments)             Medications:     Medications Prior to Admission   Medication Sig Dispense Refill Last Dose     allopurinol (ZYLOPRIM) 100 MG tablet Take 300 mg by mouth daily   Past Week at Unknown time     colchicine (COLCYRS) 0.6 MG tablet Take 0.6 mg by mouth daily   Past Week at Unknown time     furosemide (LASIX) 40 MG tablet Take 40 mg by mouth 2 times daily   Past Week at Unknown time     losartan (COZAAR) 25 MG tablet Take 25 mg by mouth 2 times daily   Past Week at Unknown time     polyethylene glycol (MIRALAX) 17 GM/Dose powder Take 17 g by mouth 2 times daily as needed   Unknown at Unknown time     senna (SENOKOT) 8.6 MG tablet Take 1 tablet by mouth 2 times daily   Past Week at Unknown time       Current Facility-Administered Medications   Medication     acetaminophen (TYLENOL) Suppository 650 mg     acetaminophen (TYLENOL) tablet 650 mg     allopurinol (ZYLOPRIM) tablet 300 mg     alum & mag hydroxide-simethicone (MAALOX) suspension 30 mL     colchicine (COLCYRS) tablet 0.6 mg     escitalopram (LEXAPRO) tablet 10 mg     furosemide (LASIX) tablet 40 mg     HOLD nitroGLYcerin IF     lidocaine (LMX4) cream     lidocaine (LMX4) cream     lidocaine 1 % 0.1-1 mL     lidocaine 1 % 0.1-5 mL     medication instruction     milrinone (PRIMACOR) infusion 200 mcg/mL PREMIX     polyethylene glycol (MIRALAX) Packet 17 g     polyethylene glycol 400 (BLINK TEARS) 0.25 % ophthalmic solution SOLN 1 drop - PT SUPPLIED     potassium chloride ER (KLOR-CON M) CR tablet 40 mEq     ramelteon (ROZEREM) tablet 8 mg     Reason beta blocker not prescribed      "sacubitril-valsartan (ENTRESTO) 24-26 MG per tablet 1 tablet     senna-docusate (SENOKOT-S/PERICOLACE) 8.6-50 MG per tablet 1 tablet    Or     senna-docusate (SENOKOT-S/PERICOLACE) 8.6-50 MG per tablet 2 tablet     sodium chloride (PF) 0.9% PF flush 10-40 mL     sodium chloride (PF) 0.9% PF flush 3 mL     sodium chloride (PF) 0.9% PF flush 3 mL            Review of Systems:   Complete 12 point ROS completed and negative unless mentioned in HPI.          Physical Exam:   BP 92/68 (BP Location: Left arm)   Pulse 93   Temp 97.7  F (36.5  C) (Oral)   Resp 18   Ht 1.854 m (6' 1\")   Wt 89.1 kg (196 lb 6.4 oz)   SpO2 97%   BMI 25.91 kg/m      Constitutional: Lying in bed, in NAD, pleasant.    Skin: Skin thick over dorsum of hands bilaterally. no nail pitting, alopecia, some dry skin/mild rash over elbows.    Eyes: nl EOM, vision, conjunctiva, sclera,   Neck: +JVD  Resp: lungs clear to auscultation, breathing comfortably on room air  CV: RRR, no murmurs, rubs or gallops, no edema  GI: soft, non-tender, non-distended  Lymph: no cervical, supraclavicular, or epitrochlear nodes  Psych: nl judgement, orientation, memory, affect.    MS: 2 large nodules over the R wrist. One is lower down, over the radius, and one is higher, over the wrist joint.  Both are soft to the touch.  No redness, heat or pain on palpation.     The fingers are without active synovitis.  Some limitation on making a full fist.  Some limitation on R wrist dorsiflexion.    Knees, and ankles without synovitis and full ROM.            Data:   Labs and imaging reviewed.         Marge Wallace  MS4         ======================================================  Staff Addendum:  This patient was interviewed and examined in the presence of the medical student who was acting as a scribe, and this note personally edited by me reflects our mutual impression. I personally performed the history and physical exam. I personally reviewed available lab and imaging " studies.    Jim Muse MD, PhD  Rheumatology

## 2021-11-08 NOTE — PLAN OF CARE
Major Shift Events:    Neuro: Intact. AXOX4. Makes all needs know. SBA/Independent.  Cards: Decreased dobutamine to 2.5 then off at 1700, will likely restart dobutamine due to oxyhemoglobin drop in 1900 VBG. Sinus tach 90's-110's throughout day. BP's soft, MAPS 62-70's. Afebrile. Restarted home dose lasix.   Pulm: RA  GI/: Voiding spontaneously. One BM. 2L FR and 2g Na diet.   Plan: Transferred to  @ 1850 with all belongings, report given to RANDALL Lin.   For vital signs and complete assessments, please see documentation flowsheets.

## 2021-11-08 NOTE — CONSULTS
"56 year old man presenting with ambulatory cardiogenic shock. CORE consult requested.     Kavon was provided with a CHF book.  I reviewed the importance of daily weights, 2 gm sodium diet, 2L fluid restriction and compliance with medications upon hospital discharge.  CORE contact phone numbers provided and patient is encouraged to call with any questions or concerns, including any weight gain or loss of 2 or more pounds in 24 hours or 5 or more pounds in 1 week.      Appointment made in CORE clinic on  at 7am.  I will follow-up with the patient at that time, sooner if needed.  Thank you for the consult.    Pilar Forrester RN BSN  Cardiology Care Coordinator - Heart Failure/ [facility]  [facility]   Questions and [phone]   First press #1 for the [employer] and then press #4 for \"Send a message to your care team\"         "

## 2021-11-08 NOTE — PLAN OF CARE
D: Pt stable and comfortable. No pain or shortness of breath noted. Dobutamine gtt stopped and milrinone gtt started. BP stable.   I: Monitored/assessed pt. Assisted with cares.  A: Pt stable and comfortable.  P: Continue to monitor/assess pt, contact provider with concerns.

## 2021-11-08 NOTE — PROGRESS NOTES
Admission          DATE  TIME 11/7/21 at 1859  -----------------------------------------------------------  Dx: Pt admitted on 11/1/21 with dizziness, SOB, cardiogenic shock to 4A. Pt recently discharged A[facility] with Lasix.  PMH of HF for 3 yrs, nonischemic myocardiopathy- Low EF 5%. Meth and cocaine use. Goute.     Dobutamine gtt turned off at 1715 and pt started on home lasix dose. VSS.   SBP 80-90 while sleeping and MAP>60.   Afebrile.     Venous hemoglobin checked prior to transferred to  had dropped from 45 (@1300 37) to 37(@ 1844). MD will place order to resume Dobutamine gtt at set rate. Pt alert and oriented x4 , VSS stable upon arrival to unit. Tele shows Sinus tachy with BBB HR 109s. Denies pain or SOB.     Pt had asked to talk to psychologist or psychiatrist due to concerns over his mental health- psy consulted per 4E nurse.      Admitted from:   Report given from: Essence  Via: wheelchair  Accompanied by: RN staff  Family Aware of Admission: per A  Belongings: On coming nurse will do that with patient  Admission Profile: on coming nurse Anila will complete that with patient.   Teaching: Orientation to unit, call don't fall, use of call light, meal times, visiting hours,  when to call for the RN (angina/sob/dizzyness, etc.), and enforced importance of safety.  Access: Right internal jugular and Left PIV  Telemetry: Placed on pt  Ht./Wt.: Complete  2 RN skin assessment: Rancho RN and Riley RN

## 2021-11-08 NOTE — PLAN OF CARE
D: admitted 11/1 with acute hypoxic respiratory failure and ambulatory cardiogenic shock  PMH: polysubstance abuse (EtOH, methamphetamine), HFrEF 2/2 NICM (last EF 10%) and gout      I: Monitored vitals and assessed pt status. Encouraged activity.   Changed: one time dose Lasix given   Running: dobutamine 2.5 mcg/kg/min (6.6 mL/hr)     A: A&Ox4. VSS on RA. BP soft at times. Tele shows SR/ST with BBB, rate 80-110s. Afebrile. Reported pain in R wrist and intermittent pain in bilateral feet, but declined intervention. Dobutamine restarted beginning of this shift, trending oxyhemoglobin Q6hr (improved since restarting). Urinating adequately. Up independently. Appeared to rest comfortably overnight.      P: Continue to monitor pt status and report changes to Cards 2 treatment team.      [id number]  Edith Parkinson RN on 11/8/2021 at 6:34 AM

## 2021-11-09 NOTE — PLAN OF CARE
D-Admitted with cardiogenic shock and acute hypoxic respiratory failure. See flow sheets for vs and assessments. Requesting something for anxiety and sleep. See provider notification note.  I-Milrinone infusing at 0.125mcg/kg/min. Diuresing with lasix. 2 gram Na diet, 2L FR. Ramelteon for sleep. Rheumatology consulted for R wrist triston hong.   A-Telemetry shows ST. Voided 400 ml after receiving afternoon lasix dose. Pt verbalizes understanding of diet and FR.  P-Continue with current poc. Picc line tomorrow. Ortho consult for triston hong.

## 2021-11-09 NOTE — PROGRESS NOTES
[facility]   Cardiology II Service / Advanced Heart Failure  Daily Progress Note  Date of Service: 11/9/2021      Patient: Kavon Banerjee  MRN: [id number]  Admission Date: 11/1/2021  Hospital Day # 8    Assessment and Plan: Mr. Kavon Banerjee is a 56yr old male with a longstanding history of chronic systolic heart failure secondary to NICM (LVEF < 10%, LVEDD 8.0cm) c/b recent cardiogenic shock (hospitalized at OSH, required inotropic support, left AMA 11/1) and polysubstance abuse (alcohol, meth) who presented to the ER with dizziness and shortness of breath.  He was found to be in decompensated heart failure (NTproBNP 3891, Na 126, lactic 3.8), so was admitted to the ICU and started on dobutamine and norepinephrine.      Acute on Chronic Systolic Heart Failure secondary to NICM. Secondary to polysubstance abuse (meth and ETOH). Admitted for cardiogenic shock to OSH and left AMA, represented to [facility] ED. Decompensation attributed to rapid inotrope taper, non optimal GDMT, and dietary indiscretions.   Stage C, NYHA Class III  ACEi/ARB Entresto 24/26 mg po BID   BB contraindicated due to low cardiac output. Milrinone increased to 0.25 mg/kg/min  Aldosterone antagonist deferred while other medical therapy is prioritized  SCD prophylaxis GDMT  Fluid status hypervolemic. Lasix 60 mg IV BID, change to Lasix 80 mg po BID.   - Right internal jugular line removed.     Gout. 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   - Appreciate Rheumatology consult.    - Follow up with Orthopedics outpatient to discuss extraction of tophi.   - Continue Allopurinol and Colchicine.     Polysubstance Abuse. History of cocaine, meth, mushrooms, LSD, and ETOH abuse. Drug and peth negative 11/2, but admits to sipping ETOH when out with friends.   - Reiterated complete abstinence and need for CD treatment prior to advanced therapy candidacy.     DM Type II, controlled. Hgb  "A1C-6.7.  - Dietary management.     PARUL, resolved.   - Cr 1.13.     FEN: 2 gram sodium diet   PROPHY:   LINES:  PICC   DISPO: TBD  CODE STATUS:  DNR/DNI  ================================================================  Interval History/ROS: He notes episode of nausea overnight, resolved this AM and tolerated early AM chili for a snack. He denies fever, chills, chest pain, palpitations, cough, nausea, vomiting, diarrhea, and LE edema. He is tolerating oral intake and ambulation.     Last 24 hr care team notes reviewed.   ROS:  4 point ROS including Respiratory, CV, GI and , other than that noted in the HPI, is negative.     Medications: Reviewed in EPIC.     Physical Exam:   BP (!) 87/60 (BP Location: Left arm)   Pulse 95   Temp 97.5  F (36.4  C) (Axillary)   Resp 18   Ht 1.854 m (6' 1\")   Wt 90 kg (198 lb 8 oz)   SpO2 95%   BMI 26.19 kg/m    GENERAL: Appears alert and oriented times three.   HEENT: Eye symmetrical and free of discharge bilaterally. Mucous membranes moist and without lesions.  NECK: Supple and without lymphadenopathy. JVD lower 1//3 of neck.   CV: RRR, S1S2 present without murmur, rub, or gallop.   RESPIRATORY: Respirations regular, even, and unlabored. Lungs CTA throughout.   GI: Soft and mildly distended with normoactive bowel sounds present in all quadrants. No tenderness, rebound, guarding. No organomegaly.   EXTREMITIES: Trace bilateral LE peripheral edema. 2+ bilateral pedal pulses.   NEUROLOGIC: Alert and orientated x 3. CN II-XII grossly intact. No focal deficits.   MUSCULOSKELETAL: No joint swelling or tenderness.   SKIN: No jaundice. No rashes or lesions. Right internal jugular and PICC site CDI.     Data:  CMPRecent Labs   Lab 11/09/21  0603 11/08/21  1801 11/08/21  0527 11/07/21  1715 11/07/21  0506 11/06/21  1625 11/06/21  0535 11/05/21  1551 11/05/21  0505    134 135 136 138  138   < > 136   < > 137  137   POTASSIUM 4.4 4.3 3.9 4.1 4.0  4.0   < > 3.6   < > 3.7  3.7 "   CHLORIDE 103 102 103 103 104  104   < > 100   < > 103  103   CO2 28 27 26 26 27  27   < > 27   < > 28  28   ANIONGAP 6 5 6 7 7  7   < > 9   < > 6  6   GLC 90 101* 132* 81 75  75   < > 121*   < > 77  77   BUN 12 13 11 11 8  8   < > 9   < > 14  14   CR 1.13 1.19 1.13 1.12 1.11  1.11   < > 1.04   < > 1.23  1.23   GFRESTIMATED 73 69 73 74 75  75   < > 81   < > 66  66   JESSICA 9.0 8.8 9.0 9.1 8.7  8.7   < > 8.4*   < > 8.5  8.5   MAG  --   --  2.1  --  2.0  --  1.9  --  2.1   PROTTOTAL  --   --  6.2* 6.9 6.0*  --  6.3*  --  6.0*   ALBUMIN  --   --  2.8* 3.3* 2.7*  --  2.8*  --  2.7*   BILITOTAL  --   --  0.7 0.7 0.7  --  0.8  --  1.0   ALKPHOS  --   --  111 133 110  --  123  --  102   AST  --   --  15 17 9  --  24  --  17   ALT  --   --  18 21 19  --  31  --  21    < > = values in this interval not displayed.     CBC  Recent Labs   Lab 11/09/21  0415 11/08/21 0527 11/07/21  0506 11/06/21  0535   WBC 8.6 8.3 8.6 9.7   RBC 4.42 4.74 4.73 4.62   HGB 10.1* 10.7* 10.7* 10.4*   HCT 33.7* 36.4* 35.9* 34.6*   MCV 76* 77* 76* 75*   MCH 22.9* 22.6* 22.6* 22.5*   MCHC 30.0* 29.4* 29.8* 30.1*   RDW 21.2* 21.0* 20.1* 19.8*    321 321 298     INR  Recent Labs   Lab 11/08/21 0527 11/07/21  0506 11/06/21  0535 11/05/21  0505   INR 1.22* 1.26* 1.29* 1.45*       Patient discussed with Dr. Rogel.     Hillary IYERP  11/9/2021

## 2021-11-09 NOTE — PROCEDURES
[facility]    Single Lumen PICC Placement    Date/Time: 11/9/2021 8:40 AM  Performed by: Anirudh Pandya RN  Authorized by: Chastity Goldman NP   Indications: Home inotrope.    UNIVERSAL PROTOCOL   Site Marked: Yes  Prior Images Obtained and Reviewed:  Yes  Required items: Required blood products, implants, devices and special equipment available    Patient identity confirmed:  Verbally with patient, arm band, hospital-assigned identification number and provided demographic data  NA - No sedation, light sedation, or local anesthesia  Confirmation Checklist:  Patient's identity using two indicators, relevant allergies, procedure was appropriate and matched the consent or emergent situation and correct equipment/implants were available  Time out: Immediately prior to the procedure a time out was called    Universal Protocol: the Joint Commission Universal Protocol was followed    Preparation: Patient was prepped and draped in usual sterile fashion           ANESTHESIA    Anesthesia: See MAR for details  Local Anesthetic:  Lidocaine 1% without epinephrine  Anesthetic Total (mL):  1      SEDATION    Patient Sedated: No        Preparation: skin prepped with ChloraPrep  Skin prep agent: skin prep agent completely dried prior to procedure  Sterile barriers: maximum sterile barriers were used: cap, mask, sterile gown, sterile gloves, and large sterile sheet  Hand hygiene: hand hygiene performed prior to central venous catheter insertion  Type of line used: Power PICC  Catheter type: single lumen  Lumen type: non-valved  Catheter size: 4 Fr  Brand: Bard  Lot number: [id number]  Placement method: venipuncture, MST, ultrasound and tip confirmation system  Number of attempts: 1  Successful placement: yes  Orientation: right  Location: basilic vein (vein diameter - 0.62 cm)  Arm circumference: adults 10 cm  Extremity circumference: 30  Visible catheter length: 1  Total catheter  length: 44  Dressing and securement: chlorhexidine disc applied, glue, sterile dressing applied, statlock, site cleaned and alcohol impregnated caps  Post procedure assessment: blood return through all ports and free fluid flow (placement verified by Sherlock 3CG)  PROCEDURE   Patient Tolerance:  Patient tolerated the procedure well with no immediate complications  Describe Procedure: PICC tip is in satisfactory location as verified by avolution Sherlock 3CG Tip Confirmation System. PICC is OK to use.

## 2021-11-09 NOTE — PROGRESS NOTES
Therapy: IV MILRINONE  Insurance: Medicare & BCBS supplement plans    Medicare will cover 80%  BCBS will cover the remaining 20%    Patient meets Medicare criteria for Milrinone, nursing visits in home are also covered since patient meets criteria for therapy.    Please contact Intake with any questions, [phone] or In Basket pool,  Home Infusion ([phone]).

## 2021-11-09 NOTE — PLAN OF CARE
Shift [id number]     Mr. Kavon Banerjee is a 56yr old male with a longstanding history of chronic systolic heart failure secondary to NICM (LVEF < 10%, LVEDD 8.0cm) c/b recent cardiogenic shock (hospitalized at OSH, required inotropic support, left AMA 11/1) and polysubstance abuse (alcohol, meth) who presented to the ER with dizziness and shortness of breath.     Neuro: AOx4, denies lightheadedness and dizziness. Reports mild-moderate numbness in feet per pt baseline.   Resp: Denies SOB and DUNN, LS clear and diminished in bases. RA sating >92%.  Cardiac: Reported chest pain, Dr. Lundberg notified, EKG done, no changes from baseline. SR/ST, VSS  GI/: 2L FR, 2g Na diet. Reported nausea, pt given Maalox per Dr. Lundberg.   Skin: generalized bruising.  Labs: Venous blood gas, CBC, BMP ordered and sent to lab.  LDAs: Triple lumen R internal jugular. White cap infusing milrinone at 0.125 mcg/kg/min, blue and brown cap saline locked.     Pt reported not feeling well, Dr. Lundberg notified. EKG done and Maalox PRN given. See notes for more info, will continue to monitor and report any changes to team.

## 2021-11-09 NOTE — PROVIDER NOTIFICATION
"Patient c/o of \"feeling sick to the stomach and similar to when dobutamine was stopped\" nausea and also CP.  Dr. Lundberg was notified EKG done, CBC with diff and blood gas venous collected and sent to lab.    Patient was seen by Dr. Lundberg, agreed on Maalox oral.  Will continue with milrinone.     "

## 2021-11-09 NOTE — PROGRESS NOTES
Care Management Follow Up    Length of Stay (days): 8    Expected Discharge Date: 11/12/2021     Concerns to be Addressed:   Discharge planning  Patient plan of care discussed at interdisciplinary rounds: Yes    Anticipated Discharge Disposition:  Home   Anticipated Discharge Services:  IV milrinone - waiting to see if [facility] will accept  Anticipated Discharge DME:        Additional Information:  Per [facility] - Patient Kavon Banerjee meets Medicare criteria for Milrinone, between Medicare and his BCBS supplement plans he is covered 100%. Nursing is also covered since he meets criteria for the therapy.    Per [facility] liaison he still needs to review patient to determine if they will accept patient under their services. If they do, appears that home nursing is covered.     Per team pt will be medically ready in 2-3 days and need confirmation if [facility] will accept. Updated Vasquez.       Cami Hood RN, [place]  Float Care Coordinator  Covering 6C RN   Pager: [phone]

## 2021-11-09 NOTE — PROGRESS NOTES
"Brief cross cover note:    Paged by patient's bedside RN that patient is stating he is \"not feeling well\" and feeling similar to when his dobutamine had been trialed off the day prior. Noted symptoms of nausea/upset stomach and maybe some chest discomfort. Ordered EKG and repeat mixed venous blood gas and went to assess patient at bedside. Patient reports to me that he was sleeping but when he woke up he felt overall uncomfortable with some slight nausea and a dull ache in his stomach and states \"this is how I felt when dobutamine was shut off before and like the times when I went home after the dobutamine was off\". He denies any shortness of breath, chest pain, palpitations, lightheadedness. He is requesting dilaudid for these symptoms. We discussed we will do a work up to assess cause and try and do other symptomatic management.     On my assessment, he is hemodynamically stable with MAP 70s, HR regular and in 90s and he is satting well on room. Abdomen is soft, non distended and non-tender to my palpation with normoactive bowel sounds heard. He is overall warm and well perfused.     Review of EKG shows no changes from his baseline, though his QTc remains high (>500) so we will avoid anti-emetics, spoke with RN to try prn Maalox for his indigestion/discomfort. Patient is frustrated that we are \"going to trial an error when we know dilaudid will work like it worked last time\". Quite insistent that he gets dilaudid. Explained again that we have no indications for this and will trial Maalox.     Mixed venous returned and reviewed by me, svO2 is stable from earlier today, given this we will keep the milrinone the same rate as it is. Will continue to monitor overall symptoms and reassess as necessary.    Tonia Lundberg PGY-3  Cardiology cross cover   "

## 2021-11-09 NOTE — PROVIDER NOTIFICATION
11/09/21 0840   PICC Single Lumen 11/09/21 Right Basilic   Placement Date/Time: 11/09/21 (c) 0840   Catheter Brand: Bard  Size (Fr): 4 Fr  Lot #: [id number]  Full barrier precautions done: Yes, hand hygiene, sterile gown, sterile gloves, mask, cap, full body drape, chlorhexidine scrub  Consent Signed: Yes  Time...   Site Assessment WDL   Line Status Blood return noted;Saline locked   External Cath Length (cm) 1 cm   Extremity Circumference (cm) 30 cm   Extravasation? No   Dressing Intervention Chlorhexidine patch;Transparent;Securing device;New dressing   Dressing Change Due 11/16/21   Lumen A - Cap Change Due 11/13/21   PICC Comment PICC inserted   Line Necessity Yes, meets criteria

## 2021-11-10 NOTE — PROGRESS NOTES
RHEUMATOLOGY PROGRESS NOTE - FELLOW     Kavon Banerjee MRN# [id number]   Age: 56 year old YOB: 1965     Date of Admission:  11/1/2021  Date of initial consult: 11/8/2021    Assessment and Plan:   Summary: 55 y/o M with chronic gout on allipurinol and colchicine as well as severe non-ischemic cardiomyopathy, currently hospitalized for CHF/cardiogenic shock.   Rheumatology consulted for chronic gout.  Pt wanting to have gouty tophi from R wrist removed.    Interval discussion: On re-examination, the area on his wrist is suspicious for a ganglion cyst vs tenosynovitis (it is too spongy for a typical tophus).  Still agree with orthopedic consults.  Obtain uric acid now.  Follow-up in 3 to 4 weeks with outpt rheumatology with repeat uric acid at that time.    Problem list:  gout  Lesion on wrist, tophus vs cyst    Recommendations:  -- obtain uric acid (ordered by rheumatology)  -- agree with orthopedic consult  -- f/u in 3 to 4 weeks with outpt rheumatology with repeat uric acid.      The patient was seen and staffed with Dr. Abhishek Wallace  MS4    I was present with the medical student who participated in the service and in the documentation of the note. I have verified the history and personally performed the physical exam and medical decision making. I agree with the assessment and plan of care as documented in the note. Ballotable, non-tender, cool swelling at radial aspect of R wrist dorsum. Intercritical gout vs ganglion cyst are on the differential diagnosis. I recommend continuing allopurinol 300 mg daily and colchicine 0.6 mg daily, checking uric acid level. Plan rheumatology followup in 3-4 weeks.    Edison Weiss M.D.  Staff Rheumatologist, [facility]  Pager [phone]      Subjective/24 hour events:   Gives same hx of his gout and tophi as previously, see initial consult note from 11/8          Medications:     Current Facility-Administered Medications   Medication     acetaminophen  "(TYLENOL) Suppository 650 mg     acetaminophen (TYLENOL) tablet 650 mg     allopurinol (ZYLOPRIM) tablet 300 mg     alum & mag hydroxide-simethicone (MAALOX) suspension 30 mL     colchicine (COLCYRS) tablet 0.6 mg     escitalopram (LEXAPRO) tablet 10 mg     furosemide (LASIX) tablet 80 mg     heparin lock flush 10 UNIT/ML injection 5-20 mL     heparin lock flush 10 UNIT/ML injection 5-20 mL     HOLD nitroGLYcerin IF     lidocaine (LMX4) cream     lidocaine (LMX4) cream     lidocaine 1 % 0.1-1 mL     lidocaine 1 % 0.1-5 mL     medication instruction     milrinone (PRIMACOR) infusion 200 mcg/mL PREMIX     polyethylene glycol (MIRALAX) Packet 17 g     polyethylene glycol 400 (BLINK TEARS) 0.25 % ophthalmic solution SOLN 1 drop - PT SUPPLIED     potassium chloride ER (KLOR-CON M) CR tablet 40 mEq     ramelteon (ROZEREM) tablet 8 mg     Reason beta blocker not prescribed     sacubitril-valsartan half-tab 12-13 mg     senna-docusate (SENOKOT-S/PERICOLACE) 8.6-50 MG per tablet 1 tablet    Or     senna-docusate (SENOKOT-S/PERICOLACE) 8.6-50 MG per tablet 2 tablet     sodium chloride (PF) 0.9% PF flush 10-40 mL     sodium chloride (PF) 0.9% PF flush 3 mL     sodium chloride (PF) 0.9% PF flush 3 mL          Physical Exam:   BP 98/71 (BP Location: Left arm)   Pulse 98   Temp 97.5  F (36.4  C) (Oral)   Resp 18   Ht 1.854 m (6' 1\")   Wt 89.9 kg (198 lb 3.2 oz)   SpO2 96%   BMI 26.15 kg/m      General: Sitting in bed, in NAD, pleasant  Msk: 2 large nodules over the R wrist. One is lower down, over the radius, and one is higher, over the wrist joint.  Spongy to the touch, no redness or heat.  Not TTP.          Data:   Labs and imaging reviewed.     Marge Wallace  MS4     "

## 2021-11-10 NOTE — CONSULTS
.  Health Psychology                      Micheline Randle, Ph.D., L.P [phone]  Stephany Byrd, Ph.D., L.P. [phone]  Jocelynn Fang, Ph.D. [phone]  Edith Clark, Ph.D., L.P. [phone]  Adrian Gonzalez, Ph.D., A.B.P.P., L.P. [phone]         Sangita Guzman, Ph.D., L.P. [phone]     [facility], 3rd Floor  [address] [place] [address]    Inpatient Health Psychology Consultation    Reviewed chart and attempted initial health psychology consultation. Pt indicated he would like to meet, but at a later time as he preferred to rest. Will re-attempt at a later time based on availability.     Edith Clark, PhD,   Clinical Health Psychologist

## 2021-11-10 NOTE — PROGRESS NOTES
Major Shift Events: New picc line placed. RIJ removed. Milrinone gtt increased. Needs PICC line teaching.   Plan: Continue to Monitor   For vital signs and complete assessments, please see documentation flowsheets

## 2021-11-10 NOTE — PLAN OF CARE
Admitted 11/1 for cardiogenic shock. History of NICM, systolic HF, EF <10%, gout, DM 2, and polysubstance abuse.    Neuro: A&Ox4. Slept well overnight.  Cardiac: SR/ST. VSS.   Respiratory: Sating 90s  on RA.  GI/: Adequate urine output. Maalox given x1 indigestion. Last BM 11/10.  Diet/appetite: On 2 gram Na diet with 2L FR. Eating well.  Activity:  Up ad aliya.  Pain: Denies pain  Skin: No new deficits noted.  LDA's: PIV saline locked. PICC infusing Milrinone at 0.25mcg/kg/min.    Plan: Anticipate discharge with home Milrinone when medically ready. Continue with POC. Notify primary team with changes.

## 2021-11-10 NOTE — PROGRESS NOTES
"CLINICAL NUTRITION SERVICES - ASSESSMENT NOTE     Nutrition Prescription    RECOMMENDATIONS FOR MDs/PROVIDERS TO ORDER:  Consider ordering a multivitamin with minerals.    Malnutrition Status:    Moderate malnutrition in the context of acute on chronic illness    Recommendations already ordered by Registered Dietitian (RD):  None    Future/Additional Recommendations:  1. Continue with current diet order and fluid restriction per orders.   2. Monitor continuing weight loss which may be partly due to fluid loss.   3. Consider iron supplementation if necessary.  4. Monitor blood glucose control. A1c 6.7 on 11/2/21.   5. Monitor need for thiamin.     REASON FOR ASSESSMENT  Kavon Banerjee is a/an 56 year old male assessed by the dietitian for LOS    NUTRITION HISTORY    Per past RD note on 11/3, \"Low-sodium (2 g/day) nutrition education\" was attempted on 11/3, but unable to complete due to \"pt busy with cares and then asleep\". Rd completed nutrition ed with pt on 11/4, providing education on low-sodium (2 g/day) diet. \"Reports he follows a low sodium diet at home and him and his mom are aware of content of foods and foods to stay away from\". The RD provided verbal instruction on low-sodium meal planning and provided the following handouts: How to Read Nutrition Labels, Low-Sodium Foods and Drinks, Tips for a Low-Sodium Diet, Seasoning Your Foods Without Adding Salt, and Managing Fluid Restriction.    From h&p, \"He has a history of both alcohol and methamphetamine abuse.  He has used these drugs recently. It would take clinic attendance, chemical dependency treatment, adherence to medical therapy in order to be considered for any form of advanced HF therapies.\" Pt also has a history of Type II DM that is managed with diet.    Per conversation with the pt on 11/10, he has received diet education regarding a low sodium diet, but stated he has not been following that diet at home. He is aware that he has a 2 liter fluid " "restriction. Pt stated that there have been no changes in appetite and that he is still eating as well as he did PTA. He recently (within the past 2 weeks) moved from [place] to [place] and lives with his mother here. They grocery shop and cook together. Pt states that he enjoys cooking and is looking forward to fitting the 2 g sodium restriction into his cooking to help him feel better. Pt reports weighing 220 lbs 3-4 weeks ago (226 lbs on office visit on 10/5/21), and being down to 198 lbs now. He also reports feeling weaker and lacking muscle in arms and legs since admission.    CURRENT NUTRITION ORDERS  Diet: 2 g Sodium and 2000 mL Fluid Restriction  Intake/Tolerance: Per nursing flowsheets, pt consuming 75 - 100% meals ordered from room service. Full meals being ordered 3x/day per [facility]. Patient states that he is enjoying the food from room service. He has been able to find options on the menu that fit within his restriction and are tasty. He has been trying many different food options during his current admission. Nursing notes indicate that pt is eating well.    LABS  Labs reviewed    MEDICATIONS  Medications reviewed    ANTHROPOMETRICS  Height: 185.4 cm (6' 1\")  Most Recent Weight: 90 kg (198 lb 8 oz)  IBW: 83.5 kg  BMI: Overweight BMI 25-29.9  Weight History:    Vitals:    11/07/21 0400 11/08/21 0500 11/09/21 0430   Weight: 88.4 kg (194 lb 14.2 oz) 89.1 kg (196 lb 6.4 oz) 90 kg (198 lb 8 oz)     10/5/21: 102.9 kg  4/7/21: 98.4 kg  1/9/21: 97.5 kg  7/28/20: 96.7 kg    At rounds, discussed that pt declining being weighed at some times.    Dosing Weight: 88 kg based on lowest weight this admission of 88.4 kg on 11/7    ASSESSED NUTRITION NEEDS  Estimated Energy Needs: 2200 - 2640 kcals/day (25 - 30 kcals/kg)  Justification: Estimated needs based on BMI  Estimated Protein Needs: 97 - 123 grams protein/day (1.1 - 1.4 grams of pro/kg)  Justification: Increased needs due to heart failure  Estimated " Fluid Needs: 2000 mL/day   Justification: On a fluid restriction    PHYSICAL FINDINGS  See malnutrition section below.  Per nursing flowsheets, pt has had consistent BMs since 11/4. BM x1 11/4 - 11/7, BM x0 11/8, BM x2 11/9.    MALNUTRITION  % Intake: No decreased intake noted  % Weight Loss: Difficult to assess wit diuresis.   Subcutaneous Fat Loss: Upper arm:  moderate  Muscle Loss: Thoracic region (clavicle, acromium bone, deltoid, trapezius, pectoral):  moderate and Upper leg (quadricep, hamstring):  mild  Fluid Accumulation/Edema: Does not meet criteria - Trace bilateral LE peripheral edema  Malnutrition Diagnosis: Moderate malnutrition in the context of acute on chronic illness    NUTRITION DIAGNOSIS  Limited adherence to nutrition related recommendations related to worsening heart failure and new fluid restriction as evidenced by pt not following a heart healthy diet PTA.    INTERVENTIONS  Implementation  Nutrition Education: Provided further education on low sodium diet regarding salt substitutes and lower sodium food options. He claims to have prior education as well. Provided pt with sodium room service menu.    Goals  Patient to consume % of nutritionally adequate meal trays TID, or the equivalent with supplements/snacks.  Pt to identify 3 high sodium foods and potential alternatives.     Monitoring/Evaluation  Progress toward goals will be monitored and evaluated per protocol.    Janae Tong  Dietetic Intern    I have read and agree with the above nutrition assessment, eval, and recs.   Ene Brody, MS, RD, LD, [facility]   6C Pgr: [phone]

## 2021-11-10 NOTE — PROGRESS NOTES
[facility]     11/20/21:  Received referral from Fior Hawkins RNCC for IV Milrinone.  Benefits verified.  Patient meets Medicare criteria for Milrinone, between Medicare and his BCBS supplement plans he is covered 100%. Nursing is also covered since he meets criteria for the therapy.  Spoke with patient to review home infusion services, review benefits and offer choice of providers.  Patient would like to use [facility] for home infusion.  Kavon is expected to discharge soon and will be going home on continuous IV Milrinone.   He has never done home IV therapy before and will need initial teaching.  Met with patient at bedside and provided him with information about [facility] services.  Explained about administration method, the pump, medication bag and matias pack used.   I informed him that I will assist with hook up of his home medication here at the hospital on day of discharge and he will have a follow up nurse visit at home the following day to continue teaching the daily bag changes.   Informed Kavon about supplies and delivery of supplies, storage of medication, continuous infusion requirements, backup pump and medication bag, plan for SNV and 24/7 availability of I staff while on IV therapy.  Confirmed address and contact numbers.  Informed patient that [facility] will deliver initial supplies to hospital on day of discharge and that it takes 4-5 hours for supplies to arrive after discharge orders are signed.  Patient inquired if he needs to remain in hospital to attend PLC appointment on Friday.  Informed patient that if provider determines he is ready for discharge before Friday, [facility] can provide teaching with hospital hookup and continue teaching at home.  Per patient record he has a history of drug and alcohol abuse.  Patient reports he does not currently have any trouble with substance abuse and has been sober from alcohol for almost 3years.  Informed patient about plan for weekly SNVs at home.  Patient  agreeable to home SNVs through [facility].    Patient verbalized understanding of all information given.  He is willing and able to learn and manage home IV therapy and states his mom will be able to assist.  Questions answered.  [facility] Liaison will follow along to assist with discharge home with infusion needs.      Thank you for the referral.     11/12/21:  Kavon is discharging today and will be going home on continuous Milrinone therapy.  He requires a hook up of home medication and pump prior to dc.  Met with patient and mom at bedside once supplies arrived.  Added extension tubing to PICC line.  Assisted with hook up of Milrinone after reviewing pump settings and verifying with orders.  Instructed patient and mom not to flush medication lumen with NS.  Provided information on plan for SNV, supplies and ongoing supply deliveries, storage of medication, back up pump, 24/7 availability of [facility] staff and how to contact as needed while on home IV therapy.  Patient verbalized understanding of information given.   Pt will be seen at home tomorrow by [facility] RN for first bag change.  He feels comfortable discharging and managing the IV therapy at home.    Kavon's home milrinone infusion is running as of 1450 and he is ready for discharge from [facility] perspective.  Bedside RN updated.      RANDALL Gill  [facility] Nurse Liaison  [email or web address]  Cell: [phone] M-F  [facility] Main: [phone]

## 2021-11-10 NOTE — PROGRESS NOTES
"Neuro: A&Ox4. Reports feeling tired.  Declined ADL's.  Has numbness in feet. Which patient states is \"not knew\".   Has history of gout that is being treated with  Allopurinol and colchicine.   Patient also, states he \"has feeling in feet and then no feeling\"  And that \"cardiology was informed and his response was , that is normal\".   Required no further intervention at this time.   Patient up independently in room.   Steady gait.    Cardiac: SR. VSS. Temp 98.0  HR98  Bp 94/61  Map 72  RR 18.  Was seen by Infusion RANDALL Giraldo for home milrinone    Respiratory: Sating * on RA. O2 sat 94%     GI/: Adequate urine output. BM X1 10/9 per patient    Diet/appetite: Tolerating * diet. Eating well. Has a good appetite.    Activity: Up independent. Up to chair  and to bathroom per self..    Pain: At acceptable level on current regimen. Reports pain in shoulder area.  No treatment requires.    Skin: No new deficits noted.    LDA's: Lefts sigle lumen PICC and left PIV, both patient with no signs of infection    Plan: Continue Milrinone drip at 0.25mcg/kg/min:6.7ml/hr.  Continue to assess level of comfort and tolerance to diet and activity.   Assess respiratory status.   Monitor O2 sats,  Rhythm, temp and vital signs.   Notify MD with ay arrhythmias.  Symptomatic low Bp or fevers or any other acute changes in conditon     "

## 2021-11-10 NOTE — PROGRESS NOTES
[facility]   Cardiology II Service / Advanced Heart Failure  Daily Progress Note    Patient: Kavon Banerjee  MRN: [id number]  Admission Date: 11/1/2021  Hospital Day # 9    Assessment and Plan:  Mr. Kavon Banerjee is a 56yr old male with a longstanding history of chronic systolic heart failure secondary to NICM (LVEF < 10%, LVEDD 8.0cm) c/b recent cardiogenic shock (hospitalized at OSH, required inotropic support, left AMA 11/1) and polysubstance abuse (alcohol, meth) who presented to the ER with dizziness and shortness of breath.  He was found to be in decompensated heart failure (NTproBNP 3891, Na 126, lactic 3.8), so was admitted to the ICU and started on dobutamine and norepinephrine.        PLAN:  - Decrease entresto to 12-13 mg BID  - Change code status to FULL CODE due to patient preference  - Awaiting VBG with oxyhemoglobin, first two that were drawn today were drawn peripherally, needs to be off PICC  - Continue milrinone @ 0.250 mcg/kg/min  - Continue lasix 80 mg PO BID  - CORE consult  - Will refer to chemical dependency team (patient aware and agreeable)     Chronic systolic heart failure secondary to NICM (LVEF <10% per TTE 11/2/21)  Stage D, NYHA Class III  Has a longstanding history of NICM, thought to be secondary to polysubstance abuse (meth, EtOH).  He has previously been living in [place], where he reported feeling well until about 2 months when he developed progressive exertional intolerance, PND/orthopnea, abdominal distention, edema.  He was recently hospitalized at OSH, where he was found to be in cardiogenic shock requiring inotropic support.  He left AMA on 11/1, but represented to [facility] ED due to progressively worsening symptoms.  His decline was attributed to his inotrope wean, not optimized medical therapy, and nonadherence to diet and fluid restrictions.    - ACEi/ARB/ARNi:  Decrease entresto to 12-13 mg twice daily  - Afterload reduction:  n/a  - BB:  Deferred due to  "cardiogenic shock (PTA carvedilol 12.5mg twice daily)  - Aldosterone antagonist: Deferred while other medications are prioritized.  Has been on spironolactone with good tolerance in the past.  - SGLT2i:  Deferred  - Inotropy: Stopped dobutamine on 11/9. Started milrinone at 0.125 and then was quickly increased to  - SCD ppx:  n/a  - Fluid status:  Near euvolemic- continue lasix 80 mg PO BID  Advanced therapy conversations: Not currently a candidate due to compliance issues and drug and alcohol use. Would like to try to bridge to candidacy on therapies if possible.    Polysubstance abuse  History of EtOH abuse as well as prior polysubstance abuse (reports prior use of cocaine, methamphetamine, mushrooms, LSD).  Has had recent meth use within the last month.  He reports that he has his EtOH under control, but does still \"sip\" on liquor at times when out with friends.  Drug screening and PETH were negative on 11/2.  He has not completed formal chemical dependency treatment recently.  He has been informed that he will need to complete a chemical dependency program in order to be considered for advanced heart failure therapies in the future.  Reinforced complete abstinence from substances.  - Consulting Chemical dependency team  - Will need chem dep eval on discharge  - Will need to do random screening per program protocol    PARUL  Hyponatremia  In the setting of volume overload, has resolved with diuretics.  - trend BMP    DMII  Elevated HgbA1c, has not required insulin.  - continue to monitor    Gout  Reports extremely longstanding history of gout.  Has been taking allopurinol and colchicine prior to admission.  Has painful tophi on his wrist, which he would like removed.  - Appreciate rheumatology consult  - Will refer to oupatient general surgery for consideration of surgical removal, his candidacy will depend on level of sedation needed given his advanced heart failure  - continue allopurinol 300mg daily  - continue " "colchicine 0.6mg daily    Anxiety/depression  Psychiatry consulted 11/4, started on Lexapro 10mg daily.  Note that he did not tolerate hydroxyzine and melatonin.  Continue ramelteon.        FEN: 2g Na restriction and 2L FR  PROPHY: Ambulate  LINES: PICC line to be placed today  DISPO: Pending  CODE STATUS:  DNR/DNI    =============================================================    Interval History/ROS:   Mr. Banerjee states that he feels better today. Initially when he came of dobutamine he didn't feel well and had some nausea. That is not resolved and he has not had any today. He denies lightheadedness, dizziness, syncope, palpitations, abdominal pain, nausea, vomitting. No LE edema. No cough.     He states that he hasn't used     Last 24 hr care team notes reviewed.   ROS:  4 point ROS including Respiratory, CV, GI and , other than that noted in the HPI, is negative.     Medications: Reviewed in EPIC.     Physical Exam:   BP 94/61   Pulse 98   Temp 98  F (36.7  C) (Oral)   Resp 18   Ht 1.854 m (6' 1\")   Wt 89.9 kg (198 lb 3.2 oz)   SpO2 94%   BMI 26.15 kg/m    GENERAL: Appears alert and oriented times three.  Sitting upright in bed, NAD.  HEENT: Eye symmetrical and free of discharge bilaterally. Mucous membranes moist and without lesions.  NECK: Supple and without lymphadenopathy. JVD to lower third of neck at 90 degrees  CV: RRR, S1S2 present without murmur, rub, or gallop.   RESPIRATORY: Respirations regular, even, and unlabored. Lungs CTA throughout.   GI: Soft and non distended with normoactive bowel sounds present in all quadrants. No tenderness, rebound, guarding. No organomegaly.   EXTREMITIES: No peripheral edema. 2+ bilateral pedal pulses.  Right wrist with gout tophi present.  NEUROLOGIC: Alert and interacting appropriatly. No focal deficits.   MUSCULOSKELETAL: No joint swelling or tenderness.   SKIN: No jaundice. No rashes or lesions.     Data:  [facility]  Recent Labs   Lab 11/10/21  0558 " 11/09/21  0603 11/08/21  1801 11/08/21  0527 11/07/21  1715 11/07/21  0506 11/06/21  1625 11/06/21  0535    137 134 135 136 138  138   < > 136   POTASSIUM 4.3 4.4 4.3 3.9 4.1 4.0  4.0   < > 3.6   CHLORIDE 102 103 102 103 103 104  104   < > 100   CO2 23 28 27 26 26 27  27   < > 27   ANIONGAP 9 6 5 6 7 7  7   < > 9   GLC 89 90 101* 132* 81 75  75   < > 121*   BUN 12 12 13 11 11 8  8   < > 9   CR 1.16 1.13 1.19 1.13 1.12 1.11  1.11   < > 1.04   GFRESTIMATED 71 73 69 73 74 75  75   < > 81   JESSICA 9.2 9.0 8.8 9.0 9.1 8.7  8.7   < > 8.4*   MAG 2.2  --   --  2.1  --  2.0  --  1.9   PROTTOTAL 6.6*  --   --  6.2* 6.9 6.0*  --  6.3*   ALBUMIN 2.9*  --   --  2.8* 3.3* 2.7*  --  2.8*   BILITOTAL 0.6  --   --  0.7 0.7 0.7  --  0.8   ALKPHOS 114  --   --  111 133 110  --  123   AST 16  --   --  15 17 9  --  24   ALT 15  --   --  18 21 19  --  31    < > = values in this interval not displayed.     CBC  Recent Labs   Lab 11/10/21  0558 11/09/21  0415 11/08/21  0527 11/07/21  0506   WBC 8.2 8.6 8.3 8.6   RBC 4.75 4.42 4.74 4.73   HGB 10.9* 10.1* 10.7* 10.7*   HCT 36.6* 33.7* 36.4* 35.9*   MCV 77* 76* 77* 76*   MCH 22.9* 22.9* 22.6* 22.6*   MCHC 29.8* 30.0* 29.4* 29.8*   RDW 21.9* 21.2* 21.0* 20.1*    344 321 321     INR  Recent Labs   Lab 11/08/21  0527 11/07/21  0506 11/06/21  0535 11/05/21  0505   INR 1.22* 1.26* 1.29* 1.45*       Patient discussed with Dr. Rogel.      Karla Ragland PA-C  Advanced Heart Failure Nurse Practitioner  [facility]

## 2021-11-10 NOTE — PROGRESS NOTES
Care Management Follow Up    Length of Stay (days): 9    Expected Discharge Date: 11/12/2021     Concerns to be Addressed: Discharge planning, home infusion     Patient plan of care discussed at interdisciplinary rounds: Yes    Anticipated Discharge Disposition: Home     Anticipated Discharge Services: Home Infusion  Anticipated Discharge DME: Lifevest     Patient/family educated on Medicare website which has current facility and service quality ratings: Yes  Education Provided on the Discharge Plan: Yes    Patient/Family in Agreement with the Plan: Yes     Referrals Placed by CM/SW: Home Infusion, [facility]  Private pay costs discussed: Not applicable    Additional Information:  This writer received an update from [facility] liamarcellus that they can accept pt on service for home IV milrinone and confirmed that they are able to provide weekly PICC line dressing changes (nursing is covered since it is a Medicare covered therapy.)   Per NP, pt will need a life vest at discharge. This writer met with pt who said he is agreeable to wearing the lifevest upon discharge. This writer provided informational brochure.     Orders placed for:  [facility]   Phone: [phone]  For home IV milrinone and PICC line cares/supplies.    Script and clinicals faxed to [facility] (Fax: [phone]) and updated [facility] Rep, Anila Castro (cell: [phone]).     CC will continue to monitor patient's medical condition and progress towards discharge.  Fior Hawkins RN BSN  6C Unit Care Coordinator  Phone number: [phone]  Pager: [phone]

## 2021-11-11 NOTE — PLAN OF CARE
Dx: 11/1 presented to ED with cardiogenic shock, dizziness, and SOB.    Neuro: A&O x4; reported feeling anxious from Rozerem and Lexapro  Cardiac: SR/ST 90-100s. Soft BP. 1+ edema in BLE.   Respiratory:  tolerate RA. LS clear. Denied SOB.  GI/: Denied nausea, bowel sounds normoactive. Reported +BM this shift. Void without difficulty; good UO.   Diet: 2g Na+; 2L FR.  Skin: No new deficit noted.   Pain: Denied  Drips: Milrinone gtt infusing at 0.25mc/kg/hr to R SL PICC  LDAs: L PIV  Labs: Uric Acid 5.5  Mobility: IND  Social: Friend visited during shift.    Plan:  Continue to monitor, follow POC, and notify care team for changes.

## 2021-11-11 NOTE — PROGRESS NOTES
Neuro: A&Ox4.   Is pleasant and cooperative.     Cardiac: NSR HR 90's with no ectopy.   Had some low blood pressures while sitting up in the chair.   Bp 79/52-79/57 MAP 60-65.  No chest pain or shortness of breath at the time.   NP for cards 2 was notified and did see patient at bedside and retook blood pressure.   Patient had had approximately 500ml of H2O and was lying bed at the time.  At which time,  Bp 89/61 MAP 70  HR 91.   Milrinone drip rate unchanged:.25mcg/kg/min:6.7ml/hr.  Lasix and sacubitril-valsartan were both discontinued per primary team.   No fever.    Respiratory: Breathing easy.  Lungs are clear bilaterally. O2 sat 100-96% on room air.    GI/: Adequate urine output. BM X1    Diet/appetite: Tolerating * diet. Eating well  .  Activity:  Up in chair independently and up to bathroom per self.  No physical complaints with increased activity.    Pain: Denies pain.    Skin: No new deficits noted    LDA's:Intact.    Plan: Continue with POC. Notify primary team with changes.  [facility] representative to see patient tomorrow prior to discharge for life vest.   Patient has IV infusion and med teaching at 0900.

## 2021-11-11 NOTE — PROGRESS NOTES
Care Management Follow Up    Length of Stay (days): 10    Expected Discharge Date: 11/12/2021     Concerns to be Addressed:       Patient plan of care discussed at interdisciplinary rounds: Yes    Anticipated Discharge Disposition:    Anticipated Discharge Services:  [facility] (IV milrinone and PICC line cares/supplies)  Anticipated Discharge DME:  [facility] - will get teaching likely this evening.   [facility] Rep, Anila Castro (cell: [phone]).    Additional Information:  Per team patient will be ready for discharge tomorrow. Team will sign discharge orders in AM so that [facility] can prepare milrinone - take 4 hours to make and deliver. [facility] rep updated. She will find out when a rep will come for education. She call back and confirm this. Per Cards 2 NP, she has not confirmed if Elias Goode MD will follow pt for PCP needs. Will keep current PCP apt on 11/6 at this time.     2:45PM  Call from Anila at [facility]. Rep will come tomorrow AM after pt is done with PLC to complete life vest education/ set up. Bedside RN updated.       Cami Hood, RN, [place]  Float Care Coordinator  Covering 6C RNCC   Pager: [phone]

## 2021-11-11 NOTE — PLAN OF CARE
D: Admitted 11/1 for cardiogenic shock. History of NICM, systolic HF, EF <10%, gout, DM 2, and polysubstance abuse.    I: Monitored vitals and assessed pt status.   Changed: Changed code status to full code. Home infusion intro teaching,   Running: Milrinone 0.25 mcg/kg/min.   Tele: Sinus rhythm.  O2: Room air.   Mobility: Independent.     A: A0x4. VSS. Afebrile. Urinating adequately. No c/o pain or SOB.     P: Continue to monitor Pt status and report changes to cards 2 SUNITHA.    Temp:  [97.5  F (36.4  C)-98  F (36.7  C)] 97.5  F (36.4  C)  Pulse:  [] 98  Resp:  [18-20] 18  BP: ()/(54-71) 98/71  Cuff Mean (mmHg):  [61-78] 72  SpO2:  [94 %-98 %] 96 %

## 2021-11-11 NOTE — CONSULTS
"11/11/2021    CD consult completed.   I spoke with pt via bedside phone. Pt reported he wasn't interested in GURWINDER treatment at this time. Pt reports he hasn't drank for 3 years, reports he abused alcohol previously. When asked when his last use was, pt reported \"a long time ago\". Pt's PETH test was negative upon admission.   I asked pt about this in his chart, from 11/10:  Polysubstance abuse  History of EtOH abuse as well as prior polysubstance abuse (reports prior use of cocaine, methamphetamine, mushrooms, LSD).  Has had recent meth use within the last month.  He reports that he has his EtOH under control, but does still \"sip\" on liquor at times when out with friends.  Drug screening and PETH were negative on 11/2.  He has not completed formal chemical dependency treatment recently.  He has been informed that he will need to complete a chemical dependency program in order to be considered for advanced heart failure therapies in the future.  Reinforced complete abstinence from substances.    Pt reported he hasn't used meth in the last month, but in the last year sometime. Pt reported it was an isolated incident. Pt discussed his previous use, reports he has used substances but it was recreationally. Pt reports he had multiple drug tests per year working in [employer] in the past so did not use on a regular basis. Pt denies that he ever had a substance use problem, but did use them recreationally. Pt reported he would say he definitely abused alcohol in the past, but quit drinking. Pt's UA upon admission was negative.     Pt reports he would be willing to submit UAs/blood work regularly to show his abstinence. Based on our conversation about his use, he does not meet criteria for residential level of treatment, and I doubt he would even meet outpatient requirements for GURWINDER treatment.     Pt can make an outpatient GURWINDER evaluation if required. Pt would benefit from establishing community sober support by attending " "sober support meetings 1x per week. The following resources below can be added to his AVS as he does not have a current email address.      GURWINDER Evaluations within the [facility] and [facility]   Telemedicine / MyChart Appointments available   Phone: [phone]     [facility]  [phone]  [facility] - Outpatient Mental Health and Addiction (Senior/Medicare also)  [address] [facility] [address]    Critical access hospital GURWINDER evaluations   Availability and services subject to change, please call to confirm.     [facility] Appointments only   Virtual + Locations: [place], [place], [place], [place], [facility], [place], [place], [place]  Phone: [phone] or [phone]   [email or web address]    [employer]   Virtual + Locations: [place], [place], [place], [place], [place], [place], [place], [place], [place], [place], [place], [place], [place], [place], [place], [place], [place], [place], [place], [place], [place], [place], [place], [facility], [place], [place], [place], [place], [place], [place]   Phone: [phone] or [phone]   [email or web address]    Supportive Services   [place] [facility]   [address] [place] [address] [address]   [place] [address]   Phone: [phone]   [email or web address]     Alcoholics Anonymous   24/7 Phone Line: [phone]   [email or web address]   [email or web address]   For additional list of online meetings: [email or web address]-[email or web address]     Narcotics Anonymous of [place]   24 Hour Helpline: [phone]   [email or web address]   For online meetings, click \"Find a Meeting\" on homepage     CHRISTOS [email or web address]  Direct phone: [phone]                                "

## 2021-11-11 NOTE — PROGRESS NOTES
Shift summary [id number]    Pt on milrinone at 0.25 mcg.kg/min. VSS. RA. Up ad aliya. N c/o chest pain or SOB. Trending pt's BP's on current med regimen, team holding lasix and bp meds d/t hypotension this afternoon. BP's now stable, no dizziness, or symptoms. See providers note.     Plan: discharge home tomorrow-pt set to have home milrinone education tomorrow am and will be fitted for life vest.

## 2021-11-11 NOTE — PROGRESS NOTES
RHEUMATOLOGY PROGRESS NOTE - FELLOW     Kavon Banerjee MRN# [id number]   Age: 56 year old YOB: 1965     Date of Admission:  11/1/2021  Date of initial consult: 11/8/2021    Assessment and Plan:   Summary: 57 y/o M with chronic gout on allipurinol and colchicine as well as severe non-ischemic cardiomyopathy, currently hospitalized for CHF/cardiogenic shock.   Rheumatology consulted for chronic gout.  Pt wanting to have lesion from R wrist removed.    Interval discussion:   On hearing more about his previous elbow tophi, these sound suspicious for olecranon bursitis.  You can't typically aspirate a tophus.  On examination, the area on his wrist is suspicious for a ganglion cyst vs tenosynovitis (it is too spongy for a tophus).  Still agree with orthopedic consults.  Uric acid 5.5   Without any current tophi (think current wrist lesion is ganglion cyst), ok to continue current dose of allopurinol and follow up in 3 to 4 weeks with outpt rheumatology with repeat uric acid at that time.    Problem list:  gout  Lesion on wrist, tophus vs cyst    Recommendations:  -- agree with orthopedic consult  -- f/u in 3 to 4 weeks with outpt rheumatology with repeat uric acid  -- Keep same dose of 300mg/day allopurinol and 0.6mg/day colchicine    The patient was seen and staffed with Dr. Adela Walalce  Ms4    Staff Addendum:  I agree with Dr Weiss's evaluation yesterday that his wrist soft mass is more consistent with cystic lesion. In taking history re: his elbow lesions which have been surgically removed, he describes what I would consider consistent with recurrent olecranon bursitis, very likely secondary to his chronic gout given inflammatory features/description. I cannot appreciate any tophi on exam today. Agree with continuing his ULT at current dose along with paradoxical flare prophylaxis. If total MSU burden is thought to be great and tophi a feature of his disease, may be worthwhile further  uptitrating his ULT to achieve sUA <5, though he wishes not to make changes to his dose at this time, which is reasonable given lack of tophi that I can appreciate today. Follow-up plan is outlined above.     This patient was interviewed and examined in the presence of the medical student who was acting as a scribe, and this note personally edited by me reflects our mutual impression. I personally performed the history and physical exam. I personally reviewed available lab and imaging studies.    Michael Chapman MD  Rheumatology    Subjective/24 hour events:   On hearing more about his previous elbow tophi, he tells us these were also soft and spongy like his current lesion.  His doctor would aspirate them, but eventually he chose surgical intervention.  He would like an immediate solution for his R wrist lesion, as he wants to live his best life in the time he has left.       14 point review of systems obtained and negative if not documented above.       Medications:     Current Facility-Administered Medications   Medication    acetaminophen (TYLENOL) Suppository 650 mg    acetaminophen (TYLENOL) tablet 650 mg    allopurinol (ZYLOPRIM) tablet 300 mg    alum & mag hydroxide-simethicone (MAALOX) suspension 30 mL    colchicine (COLCYRS) tablet 0.6 mg    escitalopram (LEXAPRO) tablet 10 mg    heparin lock flush 10 UNIT/ML injection 5-20 mL    heparin lock flush 10 UNIT/ML injection 5-20 mL    HOLD nitroGLYcerin IF    lidocaine (LMX4) cream    lidocaine 1 % 0.1-1 mL    medication instruction    milrinone (PRIMACOR) infusion 200 mcg/mL PREMIX    polyethylene glycol (MIRALAX) Packet 17 g    polyethylene glycol 400 (BLINK TEARS) 0.25 % ophthalmic solution SOLN 1 drop - PT SUPPLIED    potassium chloride ER (KLOR-CON M) CR tablet 40 mEq    ramelteon (ROZEREM) tablet 8 mg    Reason beta blocker not prescribed    senna-docusate (SENOKOT-S/PERICOLACE) 8.6-50 MG per tablet 1 tablet    Or    senna-docusate (SENOKOT-S/PERICOLACE)  "8.6-50 MG per tablet 2 tablet    sodium chloride (PF) 0.9% PF flush 3 mL    sodium chloride (PF) 0.9% PF flush 3 mL    torsemide (DEMADEX) tablet 50 mg          Physical Exam:   BP (!) 79/52 (BP Location: Left arm)   Pulse 90   Temp 97.7  F (36.5  C) (Oral)   Resp 16   Ht 1.854 m (6' 1\")   Wt 90.3 kg (199 lb 1.6 oz)   SpO2 96%   BMI 26.27 kg/m      General: Sitting in bed, in NAD, pleasant  Msk: 2 large nodules over the R wrist. One is lower down, over the radius, and one is higher, over the wrist joint.  Spongy to the touch, no redness or heat.  Not TTP.          Data:   Labs and imaging reviewed.     Marge Wallace  MS4       Resident/Fellow Attestation   I, Joshua Dawson, was present with the medical student who participated in the service and in the documentation of the note.  I have verified the history and personally performed the physical exam and medical decision making.  I agree with the assessment and plan of care as documented in the note.      Joshua Dawson MD  PGY1  Date of Service (when I saw the patient): 11/11/21    "

## 2021-11-11 NOTE — PROGRESS NOTES
[facility]   Cardiology II Service / Advanced Heart Failure  Daily Progress Note    Patient: Kavon Banerjee  MRN: [id number]  Admission Date: 11/1/2021  Hospital Day # 10    Assessment and Plan:  Mr. Kavon Banerjee is a 56yr old male with a longstanding history of chronic systolic heart failure secondary to NICM (LVEF < 10%, LVEDD 8.0cm) c/b recent cardiogenic shock (hospitalized at OSH, required inotropic support, left AMA 11/1) and polysubstance abuse (alcohol, meth) who presented to the ER with dizziness and shortness of breath.  He was found to be in decompensated heart failure (NTproBNP 3891, Na 126, lactic 3.8), so was admitted to the ICU and started on dobutamine and norepinephrine.        PLAN:  - Stopped Entresto  - Changed code status to FULL CODE due to patient preference  - Continue milrinone @ 0.250 mcg/kg/min  - Change to torsemide 50 mg BID (increased diuretics dose and changed from lasix)  - Will refer to Addiction Medicine team (patient aware and agreeable)   - Dr. Goode will not be patients PCP at this time  - Inotrope teaching tomorrow  [facility]    Chronic systolic heart failure secondary to NICM (LVEF <10% per TTE 11/2/21)  Stage D, NYHA Class III  Has a longstanding history of NICM, thought to be secondary to polysubstance abuse (meth, EtOH).  He has previously been living in [place], where he reported feeling well until about 2 months when he developed progressive exertional intolerance, PND/orthopnea, abdominal distention, edema.  He was recently hospitalized at OSH, where he was found to be in cardiogenic shock requiring inotropic support.  He left AMA on 11/1, but represented to [facility] ED due to progressively worsening symptoms.  His decline was attributed to his inotrope wean, not optimized medical therapy, and nonadherence to diet and fluid restrictions.    - ACEi/ARB/ARNi:  Stop entresto d/t hypotension  - Afterload reduction:  n/a  - BB:  Deferred due to  "cardiogenic shock (PTA carvedilol 12.5mg twice daily)  - Aldosterone antagonist: Deferred while other medications are prioritized.  Has been on spironolactone with good tolerance in the past.  - SGLT2i:  Deferred  - Inotropy: Stopped dobutamine on 11/9. Started milrinone at 0.125 and then was quickly increased to 0.25  - SCD ppx:  n/a  - Fluid status:  Slightly hypervolemic- change to torsemide 50 mg BID  Advanced therapy conversations: Not currently a candidate due to compliance issues and drug and alcohol use. Would like to try to bridge to candidacy on therapies if possible.    Polysubstance abuse  History of EtOH abuse as well as prior polysubstance abuse (reports prior use of cocaine, methamphetamine, mushrooms, LSD).  Has had recent meth use within the last month.  He reports that he has his EtOH under control, but does still \"sip\" on liquor at times when out with friends.  Drug screening and PETH were negative on 11/2.  He has not completed formal chemical dependency treatment recently.  He has been informed that he will need to complete a chemical dependency program in order to be considered for advanced heart failure therapies in the future.  Reinforced complete abstinence from substances.  - Consulting Chemical dependency team  - Will need chem dep eval on discharge  - Will need to do random screening per program protocol    PARUL  Hyponatremia  In the setting of volume overload, has resolved with diuretics.  - trend BMP    DMII  Elevated HgbA1c, has not required insulin.  - continue to monitor    Gout  Reports extremely longstanding history of gout.  Has been taking allopurinol and colchicine prior to admission.  Has painful tophi on his wrist, which he would like removed.  - Appreciate rheumatology consult  - Will refer to oupatient general surgery for consideration of surgical removal, his candidacy will depend on level of sedation needed given his advanced heart failure  - continue allopurinol 300mg " "daily  - continue colchicine 0.6mg daily    Anxiety/depression  Psychiatry consulted 11/4, started on Lexapro 10mg daily.  Note that he did not tolerate hydroxyzine and melatonin.  Continue ramelteon.        FEN: 2g Na restriction and 2L FR  PROPHY: Ambulate  LINES: PICC line to be placed today  DISPO: Pending  CODE STATUS:  DNR/DNI    =============================================================    Interval History/ROS:   Mr. Banerjee states that he feels well today. No nausea or lightheadedness. No SOB or orthopena. Gets DUNN if he walks too far, but he feels like this is getting slowly better. He is a bit fatigued. He denies  dizziness, syncope, palpitations, abdominal pain, nausea, vomitting. No LE edema. No cough.     He states that he hasn't used     Last 24 hr care team notes reviewed.   ROS:  4 point ROS including Respiratory, CV, GI and , other than that noted in the HPI, is negative.     Medications: Reviewed in EPIC.     Physical Exam:   /85 (BP Location: Left arm)   Pulse 104   Temp 97.7  F (36.5  C) (Oral)   Resp 16   Ht 1.854 m (6' 1\")   Wt 90.3 kg (199 lb 1.6 oz)   SpO2 96%   BMI 26.27 kg/m    GENERAL: Appears alert and oriented times three.  Sitting upright in bed, NAD.  HEENT: Eye symmetrical and free of discharge bilaterally. Mucous membranes moist and without lesions.  NECK: Supple and without lymphadenopathy. JVD to lower third of neck at 60 degrees  CV: RRR, S1S2 present without murmur, rub, or gallop.   RESPIRATORY: Respirations regular, even, and unlabored. Lungs CTA throughout.   GI: Soft and non distended with normoactive bowel sounds present in all quadrants. No tenderness, rebound, guarding. No organomegaly.   EXTREMITIES: No peripheral edema. 2+ bilateral pedal pulses.  Right wrist with gout tophi present.  NEUROLOGIC: Alert and interacting appropriatly. No focal deficits.   MUSCULOSKELETAL: No joint swelling or tenderness.   SKIN: No jaundice. No rashes or lesions. "     Data:  CMP  Recent Labs   Lab 11/11/21  0513 11/10/21  0558 11/09/21  0603 11/08/21  1801 11/08/21  0527 11/07/21  1715 11/07/21  0506    134 137 134 135 136 138  138   POTASSIUM 4.4 4.3 4.4 4.3 3.9 4.1 4.0  4.0   CHLORIDE 105 102 103 102 103 103 104  104   CO2 27 23 28 27 26 26 27  27   ANIONGAP 6 9 6 5 6 7 7  7   GLC 76 89 90 101* 132* 81 75  75   BUN 10 12 12 13 11 11 8  8   CR 1.14 1.16 1.13 1.19 1.13 1.12 1.11  1.11   GFRESTIMATED 73 71 73 69 73 74 75  75   JESSICA 8.9 9.2 9.0 8.8 9.0 9.1 8.7  8.7   MAG 2.2 2.2  --   --  2.1  --  2.0   PROTTOTAL 6.8 6.6*  --   --  6.2* 6.9 6.0*   ALBUMIN 2.9* 2.9*  --   --  2.8* 3.3* 2.7*   BILITOTAL 0.5 0.6  --   --  0.7 0.7 0.7   ALKPHOS 120 114  --   --  111 133 110   AST 15 16  --   --  15 17 9   ALT 15 15  --   --  18 21 19     CBC  Recent Labs   Lab 11/11/21  0513 11/10/21  0558 11/09/21  0415 11/08/21  0527   WBC 7.7 8.2 8.6 8.3   RBC 5.01 4.75 4.42 4.74   HGB 11.3* 10.9* 10.1* 10.7*   HCT 38.9* 36.6* 33.7* 36.4*   MCV 78 77* 76* 77*   MCH 22.6* 22.9* 22.9* 22.6*   MCHC 29.0* 29.8* 30.0* 29.4*   RDW 22.7* 21.9* 21.2* 21.0*    340 344 321     INR  Recent Labs   Lab 11/08/21  0527 11/07/21  0506 11/06/21  0535 11/05/21  0505   INR 1.22* 1.26* 1.29* 1.45*       Patient discussed with Dr. Rogel.      Karla Ragland PA-C  Advanced Heart Failure Nurse Practitioner  [facility]

## 2021-11-12 NOTE — CONSULTS
Met with Kavon and his mother for PICC/IV med education. Both were attentive and asked appropriate questions. Kavon will be taking care of his PICC line- mom took notes but had a harder time following. Kavon was able to teach back and demonstrate how to properly change his medicine bag out using the CADD cole pump. We did not go over flushing his line as he has a single lumen and did not want to overwhelm them. We also discussed how to proper care for his PICC line at home and who and when to call with questions/concerns.      Literature given: Handwashing and Skin Care, Caring for Your PICC at Home, Changing the End Cap, Flushing the Line with Heparin, Saline or Citrate, and How to Change Your Medicine Bag

## 2021-11-12 NOTE — PLAN OF CARE
DISCHARGE   Discharged to: Home  Via: Automobile  Accompanied by: Family  Discharge Instructions: diet, activity, medications, follow up appointments, when to call the MD, and what to watchout for (i.e. s/s of infection, increasing SOB, palpitations, chest pain, low blood pressure)  Prescriptions: To be filled by [place] discharge pharmacy per pt's request; medication list reviewed & sent with pt  Follow Up Appointments: arranged; information given  Belongings: All sent with pt  IV: out  Telemetry: off  Pt exhibits understanding of above discharge instructions; all questions answered.    Kathryn Hernandez RN

## 2021-11-12 NOTE — PLAN OF CARE
Neuro: A&O x4, denied feeling anxious.   Cardiac: SR/ST BBB HR 90-100s. Soft BP. Denied dizziness.   Respiratory: Tolerates RA. LS clear.  GI/: Denied nausea, bowel sounds normoactive. LBM 11/11. Good UO.  Diet: 2g Na+; 2L FR  Skin: No new deficit noted   Pain: Denied pain  Drips: Milrinone infusing at 0.25mc/kg/min to R SL PICC   LDAs: L PIV  Mobility: IND.    Plan:  Prepare for discharge. [facility] education scheduled for 0900. [employer] rep education on lifevest prior to discharge. Per rheumatology f/u 3 to 4 weeks for repeat uric acid.

## 2021-11-12 NOTE — PROGRESS NOTES
Addiction Team Social Work Note    Date of  Intervention: 11/12/21  Collaborated with:  Dr. Goode; Patient Patient's mom    Patient information: Addiction Medicine consult.  Dr. Goode saw pt yesterday for initial consult.  Today, Dr. Goode states that pt is preparing to discharge home but plan is to follow up at [facility] ([facility]) for OP Addiction Medicine as needed.  Writer asked to see pt briefly to sign [facility] admission paperwork.    Intervention:  Chart reviewed.   Writer met briefly with pt and his mom was at his bedside.  Since mom was present did not discuss any information other than present paperwork so that pt can receive care and telemedicine care from [facility] provider.  Pt read through and signed the papers.  He reports looking forward to seeing Dr. Goode in clinic as needed.      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    Assessment:  Prepration for discharge and OP follow up.    Plan:    Anticipated Disposition:  Home.    Follow Up:  Writer available as needed.      Due to regulation of Title 42 of the Code of Federal Regulations (CFR) Part 2: Confidentiality laws apply to this note and the information wherein.  Thus, this note cannot be copy and pasted into any other health care staff's note nor can it be included in general medical records sent to ANY outside agency without the patient's written consent.    TYLER Barkley  She/her/hers  Social Work Services  Addiction Team  [phone] work cell phone  [phone] pager  8:00am-4:30pm M/T/Th/F; Off Wednesday's

## 2021-11-12 NOTE — CONSULTS
[facility]   Consult Note - Addiction Service     Date of Admission:  11/1/2021    Consult Requested by: Cardiology  Reason for Consult: Addiction     Assessment & Plan   Kavon Banerjee admitted on 2/1/2021. The patient has a PMHx that includes severe NICM with EF< 10%, and prior polysubstance use, admitted with complication of heart failure.      Addiction medicine consulted to assess for underlying substance use disorders, offer treatments if indicated, and link to care to help him meet his goal of long term sobriety.    History of Alcohol use disorder:  - prior history of heavy alcohol use.  However, has been in remission long term (3+ years), without cravings.  Will socially drink, but no active signs of alcohol use disorder.  - PETH negative.  Will continue to discuss at follow up over time.    Methamphetamine use:  Has intermittently smoked meth, but this is not a drug of choice, and does not have cravings.  Reported using some time ago with some friends to cardiology, but he tells me he thinks it was several months ago, not 1 month ago.  Currently, no active signs of methamphetamine dependence  - drug screen and confirmation panel negative on admission  Will continue to discuss at follow up over time.    Peer Support: Our per  will meet her if still hospitalized on Thursday, to provide additional outpatient resources  To contact Effie Peer  from [facility]:  Please call or text: [phone]    Linkage to Care:   We are linking Kavon to [facility] for addiction support.  He also has been linked to primary care at [facility] by cardiology    The patient's care was discussed with the Bedside Nurse, Care Coordinator/ and Patient.      I spent 120 minutes on the unit/floor managing the care of Kavon Banerjee. Over 50% of my time was spent on the following:   Significant education and counseling spent on: how substance use disorders and dependence  occur, and how it can become a chronic relapsing and remitting medical condition.    - Coordination of care with the: primary care team, patient, and myself.  I personally registered the patient and had a follow up appointment made at [facility]    Elias Goode MD  [facility]   Contact information available via [facility] Paging/Directory  Please see sign in/sign out for up to date coverage information    ChAT team (Addiction Consult Team): Coverage Monday-Friday 8-4pm    Provider (Pager)  (Pager)   Anyi Goode [phone]   Tues [address] Elias Goode [phone]   Wed Dr. Elias Goode [phone] None   Thurs Dr. Raymond Mcgee [phone]   Fri Dr. Osei Valdes [address] [facility]   Sat-[place] [facility] Team- Refer to [facility].  For urgent needs, please place a  consult for psychiatry. None     ______________________________________________________________________    Chief Complaint   History of substance use    History is obtained from the patient    History of Present Illness   Kavon Banerjee is a 55 yo male with a PMHx that includes severe NICM with EF< 10%, and prior polysubstance use, admitted with complication of heart failure.      Alcohol history:  Reports drinking heavily for years, but several years ago, was able to stop cold turkey, wihouit withdrawal.  No cravings at the time or current. Says he quit because he was worried about his health, and that it was suprisingly easy.      Other substances used:  Has used multiple drugs including LSD, marijuana, cocaine, but not for many years.    More recently, has sometimes used methamphetamines recreationally, via smoking.  No prior injecting.      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      Identified race/ethnicity: white  Employed: not currently  Housing status:  with parents  HIV status: negative      Review of Systems   The 10 point Review of Systems is negative other than noted in the HPI or here. Currently feels well, discharging home on milrinone tomorrow.    History reviewed. No pertinent past medical history.    Past Surgical History:   Procedure Laterality Date     CV RIGHT HEART CATH MEASUREMENTS RECORDED N/A 11/04/2021    Procedure: Heart Cath Right Heart Cath;  Surgeon: Rome Franklin MD;  Location:  HEART CARDIAC CATH LAB     PICC SINGLE LUMEN PLACEMENT Right 11/09/2021    44cm (1cm external), Basilic vein       Social History   Social History     Socioeconomic History     Marital status:      Spouse name: Not on file     Number of children: Not on file     Years of education: Not on file     Highest education level: Not on file   Occupational History     Not on file   Tobacco Use     Smoking status: Never Smoker     Smokeless tobacco: Never Used   Substance and Sexual Activity     Alcohol use: Not Currently     Comment: used to be a heavy drinker up until 50     Drug use: Not on file     Sexual activity: Not on file   Other Topics Concern     Not on file   Social History Narrative     Not on file     Social Determinants of Health     Financial Resource Strain: Not on file   Food Insecurity: Not on file   Transportation Needs: Not on file   Physical Activity: Not on file   Stress: Not on file   Social Connections: Not on file   Intimate Partner Violence: Not on file   Housing Stability: Not on file       Family History   FAMILY HISTORY    Medications   I have reviewed this patient's current medications    Allergies   No Known Allergies    Physical Exam   Vital Signs: Temp: 96.4  F (35.8  C) Temp src: Oral BP: 99/41 Pulse: 75   Resp: 16 SpO2: 97 % O2 Device: None (Room air)    Weight: 125 lbs 0 oz    Gen: NAD  HEENT: EOMI, PERRL, MMM  CV: extremities warm and well perfused  Resp: breathing comfortably on RA  : deferred  Msk: no LE edema  Skin: no  delma  Neuro: nonfocal exam        Due to regulation of Title 42 of the Code of Federal Regulations (CFR) Part 2: Confidentiality laws apply to this note and the information wherein.  Thus, this note cannot be copy and pasted into any other health care staff's note nor can it be included in general medical records sent to ANY outside agency without the patient's written consent.

## 2021-11-12 NOTE — DISCHARGE INSTRUCTIONS
RUPERT Stonert:  24/7 hour support: [phone]  If your vest continues to feel too tight, please call above number  --vest initially feels tight and will stretch alittle bit        ________________________________________________________  Discharge RN please fax discharge orders to home care agency: [facility]  --they need signed discharge orders by 12 noon on the day of discharge  ---page carmela Vasquez Monteiro-Roxanne@ [phone] Monday-Friday---done by Catrachita ONTIVEROS @ 1611 and @ 12:09pm 11/12/21  ---weekends call office [phone]    Supplies to be delivered to pt's room by 1:30pm for Vasquez to hook him up.                      Take your medicines every day, as directed       Monitor Your Weight and Symptoms    Contact us if you:      Gain 2 pounds in one day or 5 pounds in one week    Feel more short of breath    Notice more leg swelling    Feel lightheadeded   Change your lifestyle    Limit Salt or Sodium:    2000 mg  Limit Fluids:    2000 mL or approximately 64 ounces  Eat a Heart Healthy Diet    Low in saturated fats  Stay Active:    Aim to move at least 150 minutes every  week         To Contact us    During Business Hours:  [phone], option # 1 (University)  Then option # 4 (medical questions)     After hours, weekends or holidays:   [phone], Option #4  Ask to speak to the On-Call Cardiologist. Inform them you are a CORE/heart failure patient at the [place].     Use [employer] allows you to communicate directly with your heart team through secure messaging.    [employer] can be accessed any time on your phone, computer, or tablet.    If you need assistance, we'd be happy to help!         Keep your Heart Appointments:    CORE (Heart Failure) Clinic  November 17  7:00 Chastity Goldman NP- PLEASE ARRIVE AT 6:30 and ask to be roomed, she will see you early.    December 7  7:30 Labs  8:00 Dr Franklin

## 2021-11-12 NOTE — PROGRESS NOTES
Care Coordinator  D/I: per Karla Ragland,NP--pt to discharge today and asked me to cancel his 11/17 lab draw(BMP/BNP) @ 0630 and add it to his PCP appointment on 11/16 that is at 12:40pm, so results will be ready for CORE clinic appmt on 11/17. I called the [facility] clinic and Rosibel RN will add it.  P: I have asked for Milrinone script to be signed @ 0835 --done and discharge orders signed at 12n--I have [facility] carmela Reagan--supplies to be delivered approx 1:30pm for home hook up--he will call bedside RN.  Per Karla RaglandPt has signed discharge orders, however his Lifevest feels too tight and she asked me to call the rep: I have called Anila Castro and this is what is recommended:  [employer] Lifevest:  24/7 hour support: [phone]  If your vest continues to feel too tight, please call above number  --vest initially feels tight and will stretch alittle bit  I have informed Gabrielle. I have called pt's bedside RN Shelby [phone] and I have added info to discharge instructions.

## 2021-11-12 NOTE — DISCHARGE SUMMARY
[facility]   Cardiology II Service / Advanced Heart Failure  Discharge Summary     Kavon Banerjee MRN# [id number]   YOB: 1965 Age: 56 year old     DATE OF ADMISSION: 11/1/2021  DATE OF DISCHARGE: 11/12/2021  ADMITTING PROVIDER: Justice Rivers MD  DISCHARGE PROVIDER: Rome Franklin MD and Karla Ragland PA-C   PRIMARY PROVIDER:  No Ref-Primary, Physician    ADMIT DIAGNOSES:   - Acute on Chronic systolic heart failure secondary to NICM (LVEF <10% per TTE 11/2/21) with ambulatory cardiogenic shock  - Polysubstance abuse  - PARUL  - Hyponatremia  - DMII  - Gout  - Anxiety/depression    DISCHARGE DIAGNOSES:   - Acute on Chronic systolic heart failure secondary to NICM (LVEF <10% per TTE 11/2/21) with ambulatory cardiogenic shock resolved, now inotrope dependent  - Polysubstance abuse  - PARUL, resolved  - Hyponatremia, resovled  - DMII  - Gout  - Anxiety/depression    FOLLOW-UP:  - BMP and BNP on 11/16 at PCP appointment  - 11/16 PCP appointment  - Follow-up with [facility] clinic (Cardiology) on 11/17 - consider low dose losartan pending BPs  - Follow-up with  eye doctor scheduled 11/17  - Follow-up with Dr. Franklin as scheduled   - Follow-up with Addiction Medicine, Dr. Goode to follow  - General surgery (to talk tophi), referal placed   - Rheumatology, to manage gout, referal placed  - Neuropsychiatry, referal placed  - Social work, referal placed  - Needs random drug screening and peths followed- will be followed by [facility] for now    PENDING RESULTS:   [] N/A    HPI: Kavon Banerjee is a 56 year old male PMH NICM/DCM EF 10% (LVEDD 8.0 cm) who presents to the ER with dizziness and shortness of breath after leaving [facility] earlier today against medical advice after hospitalization for CHF/cardiogenic shock for the last 8 days.     Prior to his admission at [facility] he switched himself back to lasix at which time he developed worsening BLE edema. During that time, he  became more sob and weak. He presented to the [facility] ED for further eval. Upon presentation, initial BP was 83/60. Given hypotension and need for ongoing diuresis started on dobutamine to facilitate diuresis which was very effective. Dobutamine stopped 10/29, however, due to lower BPs after taking lopressor,  was resumed. Dobutamine was stopped AM 11/1 due to patient insisting on DC home, despite provider recommendations otherwise.      Per review of the chart, patient was discharged from [facility] earlier today (admitted from 10/25-11/1) for clinically decompensated heart failure.  He had severely reduced EF to 10-15% from his baseline 20% along with cardiorenal PARUL vs CKD. He had a coronary angiogram and RHC on 10/26 showing severe fluid overload and cardiogenic shock (RA 21 mmHg; PA 67/43, 52 mmHg; PCW mean 28 mmHg, CI 1.1) and normal coronaries.      He was on dobutamine until this morning, but this was obviously discontinued at discharge. He went home and felt poorly with dizziness, shortness of breath at rest and on minimal exertion. His mother noted that he was turning blue and called EMS. Per EMS, lblood pressure reading was 50s/20s, satting 100% on room air. He received 500 cc of fluid in route.      Has lived in [place] and gotten his medical care there until now, but was never seeing a cardiologist regularily. Was placed on neurohormonal blockade but then did not follow-up regularly.. Recently, he came to [place], where his parents live, to have his medical problems addressed. Has known nonischemic dilated cardiomyopathy, with EF 20% and moderate MR. In the past he has been managed with carvedilol, entresto, and loop diuretic. Remote cocaine use. Meth use within the year. Former heavy etoh use, but none in 2 yrs.    HOSPITAL COURSE:     Summary:   Has a longstanding history of NICM, thought to be secondary to polysubstance abuse (meth, EtOH).  He has previously been living in [place],  where he reported feeling well until about 2 months when he developed progressive exertional intolerance, PND/orthopnea, abdominal distention, edema.  He was recently hospitalized at [facility], where he was found to be in cardiogenic shock requiring inotropic support.  He left AMA on 11/1, but represented to [facility] ED due to progressively worsening symptoms.  His decline was attributed to his inotrope wean, not optimized medical therapy, and nonadherence to diet and fluid restrictions.    Patient was treated for his heart failure with diuresis and inotrope. He had had a prior dobutamine wean, but was quickly readmitted. This stay, his dobutamine wean failed and he was deemed to be inotrope dependent. Eventually was transitioned to milrinone due to some evidence that there are better outcomes on milrinone as a home inotrope when compared to dobutamine. His milrinone was increased from 0.125 to 0.25 and he was stable on this dose for 4 days prior to discharge. His heart failure symptoms greatly improved. Nausea resolved. He had no ectopy on milrinone. He was transitioned to PO diuretics. PTA lasix was stopped and he was transitioned to torsemide.He discharge weight was 195 lbs, at which point he was felt to be euvolemic. We did trial some neurohormonal blockade here in the hospital, namely entresto, but he did not tolerate the 12-13 mg BID dose d/t hypotension. Would consider trialling low dose losartan as an outpatient. Given his home inotropes and no ICD, he will go home on a life vest. Will consider EP referal for ICD in 3-4 weeks pending clinical course as an outpatient.    He is not currently a candidate for advanced therapies but he would like to become a candidate. His initial barriers are substance use and compliance. He needs to show that he can come to clinic regularly and take medications as prescribed. He also needs to have sobriety from all substances and he has agreed to begin random screening for this. This  "will be managed by [facility] clinic for now. He will also see addiction medicine as an outpatient. We are awaiting a neuropsych eval to confirm length of negative testing that he would require.    By problem:  Chronic systolic heart failure secondary to NICM (LVEF <10% per TTE 11/2/21)  Stage D, NYHA Class III    ECHO 11/2/21 with EF of 5-10%. Mild MR. Mild TR. No pericardial effusion. RHC on 11/4/2021 with Ra 14, PA 54/30/38, PCW 30 and Anthony CO/CI 3.63/1.65 and Thermodilution 2.83/1.29. Last swan numbers 11/6 CVP 17, PA(m)33, CO/CI 4.7/2.1    - ACEi/ARB/ARNi:  Stopped 12-13 mg BID entresto d/t hypotension, consider losartan in the future  - Afterload reduction: n/a  - BB:  Deferred due to cardiogenic shock (stopped PTA carvedilol)  - Aldosterone antagonist: Deferred due to soft Bps in the hospital.  Has been on spironolactone with good tolerance in the past.  - SGLT2i:  Deferred for now  - Inotropy: Stopped dobutamine on 11/9. Started milrinone at 0.125 and then was quickly increased to 0.25 mcg/kg/min which he will discharge on. PICC in place, home health care arranged.  - SCD ppx:  n/a  - Fluid status:  Euvolemic. Discharged on torsemide 50 mg BID and kcl 40 meq BID  - Advanced therapy conversations: Not currently a candidate due to compliance issues and drug and alcohol use. Would like to try to bridge to candidacy on therapies if possible. Have referred to social work and neuropsych to determine length of sobriety needed.      Polysubstance abuse  History of EtOH abuse as well as prior polysubstance abuse (reports prior use of cocaine, methamphetamine, mushrooms, LSD).  Has had recent meth use within the last month.  He reports that he has his EtOH under control, but does still \"sip\" on liquor at times when out with friends.  Drug screening and PETH were negative on 11/2.  He has not completed formal chemical dependency treatment recently.  He has been informed that he will need to complete a chemical dependency " "program in order to be considered for advanced heart failure therapies in the future.  Reinforced complete abstinence from substances.  - Neuropych and social work referall placed on discharge to clarify length of sobriety needed  - Will  do random screening per program protocol, [facility] clinic to manage     PARUL and Hyponatremia In the setting of volume overload, has resolved with diuretics.     DMII Elevated HgbA1c, has not required insulin.  - Will establish with new PCP on 11/16     Gout  Reports extremely longstanding history of gout.  Has been taking allopurinol and colchicine prior to admission.  Has painful tophi on his wrist, which he would like removed if he would be a surgical candidate. Continued PTA allopurinol and colchicine. Referred to outpatient rheumatology and general surgery for further consultation.    Anxiety/depression  Psychiatry consulted 11/4, started on Lexapro 10mg daily this admission.  Note that he did not tolerate hydroxyzine and melatonin.  Continue ramelteon. Further management per PCP.    Karla Ragland PA-C  Advanced Heart Failure/Cardiology 2   11/12/2021  Pager: [phone]    PHYSICAL EXAM:  Blood pressure 100/72, pulse 92, temperature 97.6  F (36.4  C), temperature source Oral, resp. rate 16, height 1.854 m (6' 1\"), weight 88.5 kg (195 lb 3.2 oz), SpO2 96 %.    GENERAL: Appears alert and oriented times three.  Sitting upright in bed, NAD.  HEENT: Eye symmetrical and free of discharge bilaterally. Mucous membranes moist and without lesions.  NECK: Supple and without lymphadenopathy. JVP ~7  CV: RRR, S1S2 present without murmur, rub, or gallop.   RESPIRATORY: Respirations regular, even, and unlabored. Lungs CTA throughout.   GI: Soft and non distended with normoactive bowel sounds present in all quadrants. No tenderness, rebound, guarding. No organomegaly.   EXTREMITIES: No peripheral edema. 2+ bilateral pedal pulses.  Right wrist with gout tophi present.  NEUROLOGIC: Alert and " interacting appropriatly. No focal deficits.   MUSCULOSKELETAL: No joint swelling or tenderness.   SKIN: No jaundice. No rashes or lesions.      LABS:   Last CBC:   Recent Labs   Lab Test 11/12/21  0507   WBC 6.7   RBC 4.76   HGB 10.9*   HCT 37.1*   MCV 78   MCH 22.9*   MCHC 29.4*   RDW 22.3*          Last CMP:  Recent Labs   Lab Test 11/12/21  0506      POTASSIUM 4.0   CHLORIDE 105   JESSICA 8.8   CO2 29   BUN 12   CR 1.11   GLC 88   AST 11   ALT 13   BILITOTAL 0.5   ALBUMIN 2.7*   PROTTOTAL 6.4*   ALKPHOS 109       IMAGING:  Results for orders placed or performed during the hospital encounter of 11/01/21   XR Chest Port 1 View    Narrative    EXAM: XR CHEST PORT 1 VIEW  LOCATION: [facility]  DATE/TIME: 11/1/2021 10:56 PM    INDICATION: SOB  COMPARISON: None.      Impression    IMPRESSION: Moderate to marked enlargement of the cardiac silhouette. No evidence of congestive heart failure or pneumonia. No visible pneumothorax. There is a 6 cm curvilinear metallic density overlying the right distal clavicle which may be external to   the patient and is indeterminate.   XR Chest Port 1 View    Narrative    Exam: XR CHEST PORT 1 VIEW, 11/2/2021 3:18 AM    Indication: cvc placement    Comparison: 11/1/2021    Findings:   Right IJ central venous catheter distal tip projects over the mid  superior vena cava. Cardiomegaly similar to prior. No new focal  airspace opacities. No pneumothorax. No pleural effusion. No acute  osseous abnormalities.      Impression    Impression:   1. Right IJ central venous catheter distal tip projects over the mid  superior vena cava. No pneumothorax.  2. Unchanged cardiomegaly.    I have personally reviewed the examination and initial interpretation  and I agree with the findings.    SAV GARCÍA MD         SYSTEM ID:  [id number]   XR Chest Port 1 View    Narrative    Exam: XR CHEST PORT 1 VIEW, 11/4/2021 8:56 PM    Comparison: Chest x-ray  11/2/2021    History: swan placement    Findings:  A single portable AP supine view of the chest is obtained. Carriere-Livan  catheter tip terminates over the main pulmonary artery. Right internal  jugular central venous catheter tip terminates in the high superior  vena cava.    Trachea is midline. Mediastinum is within normal limits. Unchanged  cardiomegaly. No acute airspace disease. There is no pneumothorax or  pleural effusion. The upper abdomen is unremarkable.      Impression    Impression:   1. Left internal jugular Carriere-Livan catheter tip terminates over the  main pulmonary artery.  2. No acute airspace disease.  3. Unchanged cardiomegaly.    I have personally reviewed the examination and initial interpretation  and I agree with the findings.    ZAC FLOWERS MD         SYSTEM ID:  [id number]   XR Chest Port 1 View    Narrative    EXAM: XR CHEST 1 VW 11/6/2021      HISTORY: swan placement.    COMPARISON: 11/4/2021.     TECHNIQUE: Frontal view of the chest.    FINDINGS: Left jugular Carriere-Livan catheter tip projects over the right  main pulmonary artery. Stable right jugular central catheter.  Unchanged cardiomegaly. No new airspace disease, pleural effusion, or  pneumothorax. Bones and upper abdomen are unremarkable      Impression    IMPRESSION: Carriere-Livan catheter tip projects over the right main  pulmonary artery. No new airspace disease.    I have personally reviewed the examination and initial interpretation  and I agree with the findings.    ZAIN WILLINGHAM MD         SYSTEM ID:  [id number]   XR Wrist Port Right 2 Views    Narrative    EXAM: XR WRIST PORTABLE RIGHT 2 VIEWS  LOCATION: [facility]  DATE/TIME: 11/06/2021, 10:52 AM    INDICATION: Right wrist pain.  COMPARISON: None.      Impression    IMPRESSION: Chondrocalcinosis within the triangular fibrocartilage complex. The right wrist is negative for fracture. Normal carpal alignment. There is soft tissue  swelling about the wrist.     XR Chest Port 1 View    Narrative    EXAM: XR CHEST PORT 1 VIEW  2021 3:55 PM     HISTORY:  PICC placement       COMPARISON:  2021    FINDINGS:   Frontal radiograph of the chest. Right arm PICC line tip projects over  the high SVC. Removal of Rudolph-Livan catheter and right IJ central  venous catheter. Trachea is midline. Stable enlarged cardiac  silhouette. No airspace opacities. No pleural effusion or  pneumothorax. Osseous structures are within normal limits.      Impression    IMPRESSION:   1. Right arm PICC line tip projects over the high SVC.  2. Cardiomegaly.    I have personally reviewed the examination and initial interpretation  and I agree with the findings.    NUPUR VILLALOBOS MD         SYSTEM ID:  [id number]   Echocardiogram Complete     Value    LVEF  5-10% (severely reduced)    Narrative    [id number]  [id number]  [id number]  [id number]^VALERIA^TRANG^TWIN     [facility]  Echocardiography Laboratory  [address] [place] [id number]     Name: LADI MENDOZA  MRN: [id number]  : 1965  Study Date: 2021 07:04 AM  Age: 56 yrs  Gender: Male  Patient Location: [facility]  Reason For Study: CHF  Ordering Physician: TRANG SHAW  Performed By: Janae Little RDCS     BSA: 2.2 m2  Height: 73 in  Weight: 210 lb  HR: 96  BP: 102/71 mmHg  ______________________________________________________________________________  Procedure  Complete Portable Echo Adult. Contrast Optison. Echocardiogram with two-  dimensional, color and spectral Doppler performed. Technically difficult  study. Optison (NDC #4165-9673-12) given intravenously. Patient was given 5 ml  mixture of 3 ml Optison and 6 ml saline. 4 ml wasted.  ______________________________________________________________________________  Interpretation Summary  Severe left ventricular dilation is present. Left ventricular function is  decreased. The ejection fraction is 5-10%  (severely reduced). Severe diffuse  hypokinesis is present.  Mild right ventricular dilation is present. Global right ventricular function  is mildly reduced.  Mild mitral and tricuspid insufficiency present.  Pulmonary hypertension is present. Estimated pulmonary artery systolic  pressure is 41 mmHg plus right atrial pressure.  Dilation of the inferior vena cava is present with abnormal respiratory  variation in diameter.  No pericardial effusion is present.  ______________________________________________________________________________  Left Ventricle  Severe left ventricular dilation is present. Left ventricular function is  decreased. The ejection fraction is 5-10% (severely reduced). Grade III or  advanced diastolic dysfunction. Severe diffuse hypokinesis is present. There  is no thrombus.     Right Ventricle  Mild right ventricular dilation is present. Global right ventricular function  is mildly reduced.     Atria  Mild right atrial enlargement is present. Severe left atrial enlargement is  present.     Mitral Valve  Mild mitral insufficiency is present.     Aortic Valve  Aortic valve is normal in structure and function.     Tricuspid Valve  Mild tricuspid insufficiency is present. Pulmonary hypertension is present.  Estimated pulmonary artery systolic pressure is 41 mmHg plus right atrial  pressure.     Pulmonic Valve  The pulmonic valve is normal.     Vessels  Ascending aorta 4.0 cm. Ascending aorta 1.8 cm/m2 (normal). Dilation of the  inferior vena cava is present with abnormal respiratory variation in diameter.  IVC diameter >2.1 cm collapsing <50% with sniff suggests a high RA pressure  estimated at 15 mmHg or greater.     Pericardium  No pericardial effusion is present.     Compared to Previous Study  Previous study not available for comparison.  ______________________________________________________________________________  MMode/2D Measurements & Calculations     IVSd: 1.1 cm  LVIDd: 8.1 cm  LVIDs:  7.8 cm  LVPWd: 1.3 cm  FS: 3.0 %  LV mass(C)d: 527.6 grams  LV mass(C)dI: 240.2 grams/m2  EPSS: 3.1 cm  Ao root diam: 3.4 cm  asc Aorta Diam: 4.1 cm  LVOT diam: 2.4 cm  LVOT area: 4.5 cm2  LA Volume (BP): 137.0 ml  LA Volume Index (BP): 62.3 ml/m2     RWT: 0.32     Doppler Measurements & Calculations  MV E max re: 90.7 cm/sec  MV A max re: 41.7 cm/sec  MV E/A: 2.2  MV dec slope: 876.0 cm/sec2  LV V1 max P.7 mmHg  LV V1 max: 65.0 cm/sec  LV V1 VTI: 9.8 cm  SV(LVOT): 44.2 ml  SI(LVOT): 20.1 ml/m2  PA acc time: 0.07 sec  TR max re: 321.0 cm/sec  TR max P.2 mmHg  E/E' av.4  Lateral E/e': 8.6  Medial E/e': 20.3     ______________________________________________________________________________  Report approved by: Alexis Harris 2021 09:21 AM         Cardiac Catheterization    Narrative      Right sided filling pressures are mildly elevated.    Moderately elevated pulmonary artery hypertension.    Left sided filling pressures are moderately elevated.    Hemodynamics consistent with cardiogenic shock     Elevated left and right sided filling pressures with moderate Group II   Pulmonary Hypertension.         PROCEDURES:  PICC placement  RHC    CONSULTATIONS:   Addiction medicine  Chemical dependency   Health Psychology  CORE  Rheumatology  Psychiatry     DISCHARGE MEDICATIONS:  Current Discharge Medication List      START taking these medications    Details   acetaminophen (TYLENOL) 325 MG tablet Take 2 tablets (650 mg) by mouth every 4 hours as needed for mild pain    Associated Diagnoses: Idiopathic chronic gout with tophus, unspecified site      alum & mag hydroxide-simethicone (MAALOX) 200-200-20 MG/5ML SUSP suspension Take 30 mLs by mouth every 4 hours as needed for indigestion  Qty: 355 mL, Refills: 0    Associated Diagnoses: Nausea      escitalopram (LEXAPRO) 10 MG tablet Take 1 tablet (10 mg) by mouth At Bedtime  Qty: 30 tablet, Refills: 1    Associated Diagnoses: Depression, unspecified  depression type      magnesium oxide (MAG-OX) 400 MG tablet Take 1 tablet (400 mg) by mouth daily  Qty: 30 tablet, Refills: 1    Associated Diagnoses: Nonischemic cardiomyopathy (H)      milrinone (PRIMACOR) infusion 200 mcg/mL PREMIX Inject 22.275 mcg/min into the vein continuous    Associated Diagnoses: Acute on chronic congestive heart failure, unspecified heart failure type (H)      potassium chloride ER (KLOR-CON M) 20 MEQ CR tablet Take 2 tablets (40 mEq) by mouth 2 times daily  Qty: 120 tablet, Refills: 1    Associated Diagnoses: Nonischemic cardiomyopathy (H)      ramelteon (ROZEREM) 8 MG tablet Take 1 tablet (8 mg) by mouth every evening  Qty: 30 tablet, Refills: 0    Associated Diagnoses: Other insomnia      senna-docusate (SENOKOT-S/PERICOLACE) 8.6-50 MG tablet Take 1 tablet by mouth 2 times daily as needed for constipation    Associated Diagnoses: Nausea      torsemide (DEMADEX) 10 MG tablet Take 60 mg of torsemide in the morning and 40 mg of torsemide in the evening  Qty: 150 tablet, Refills: 1    Associated Diagnoses: Nonischemic cardiomyopathy (H)         CONTINUE these medications which have NOT CHANGED    Details   allopurinol (ZYLOPRIM) 100 MG tablet Take 300 mg by mouth daily      colchicine (COLCYRS) 0.6 MG tablet Take 0.6 mg by mouth daily      polyethylene glycol (MIRALAX) 17 GM/Dose powder Take 17 g by mouth 2 times daily as needed         STOP taking these medications       furosemide (LASIX) 40 MG tablet Comments:   Reason for Stopping:         losartan (COZAAR) 25 MG tablet Comments:   Reason for Stopping:         senna (SENOKOT) 8.6 MG tablet Comments:   Reason for Stopping:               DISCHARGE DISPOSITION: Kavon Banerjee will discharge to home in stable condition.     DISCHARGE INSTRUCTIONS:  Discharge Procedure Orders   Cardiac Rehab Referral   Referral Priority: Routine Referral Type: Rehab Therapy Cardiac Therapy   Number of Visits Requested: 1     Home infusion referral   Referral  Priority: Routine Referral Type: Consultation   Number of Visits Requested: 1     General Surg Adult Referral   Standing Status: Future   Referral Priority: Routine Referral Type: Consultation   Requested Specialty: Surgery   Number of Visits Requested: 1     Rheumatology Referral   Standing Status: Future   Referral Priority: Routine Referral Type: Consultation   Requested Specialty: Rheumatology   Number of Visits Requested: 1     Neuropsychology Referral   Standing Status: Future   Referral Priority: Routine Referral Type: Mental Health Outpatient   Requested Specialty: Neuropsychology   Number of Visits Requested: 1     Reason for your hospital stay   Order Comments: Acute on chronic heart failure with cardiogenic shock     Activity   Order Comments: Your activity upon discharge: no heavy lifting (nothint more than 15 lbs)     Order Specific Question Answer Comments   Is discharge order? Yes      Monitor and record   Order Comments: Blood pressure: daily. Call Cardiology clinic if the top number is less than 90 or the bottom number is less than 50 and it doesn't get better after rechecking it    Heart Rate: Call cardiology clinic if your heart rate (at rest), his higher than 100    Daily weights: Call Cardiology if you: Gain 2 pounds in one day or 5 pounds in one week    To Contact us    During Business Hours:  [phone], option # 1 (University)  Then option # 4 (medical questions)     After hours, weekends or holidays:   [phone], Option #4  Ask to speak to the On-Call Cardiologist. Inform them you are a CORE/heart failure patient at the [place].     When to contact your care team   Order Comments: For any of the blood pressure, heart rate, or weight parameters above. Additionally call cardiology clinic if:      You Feel more short of breath, Notice more leg swelling, Feel lightheadeded, you faint, you have chest pain, you have nausea, you have increased fatigue, or anything else is concerning to  you        To Contact us    During Business Hours:  [phone], option # 1 (University)  Then option # 4 (medical questions)     After hours, weekends or holidays:   [phone], Option #4  Ask to speak to the On-Call Cardiologist. Inform them you are a CORE/heart failure patient at the [place].     Adult [facility] Follow-up and recommended labs and tests   Order Comments: Follow-up:  - Follow-up with C[facility] clinic (Cardiology) on 11/17 as scheduled  - Follow-up with your eye doctor as scheduled  - Follow-up with your new primary care doctor as scheduled  - Follow-up with Dr. [place] as scheduled    I have referred you to  - General surgery (to talk about your tophi)  - Rheumatology, to manage gout  - Neuropsychiatry (First step to becoming a candidate for advanced therapies)  - Social work (First step to becoming a candidate for advanced therapies)    Appointments on [place] and/or [facility] (with [facility] or [facility] provider or service). Call [phone] if you haven't heard regarding these appointments within 7 days of discharge.     Diet   Order Comments: Follow this diet upon discharge: Orders Placed This Encounter      Fluid restriction 2000 ML FLUID      2 Gram Sodium Diet     Order Specific Question Answer Comments   Is discharge order? Yes        >60 minutes spent in discharge, including >50% in counseling and coordination of care, medication review and plan of care recommended on follow up. Questions were answered, patient agrees to plan.

## 2021-11-13 NOTE — PLAN OF CARE
Occupational Therapy Discharge Summary    Reason for therapy discharge:    Discharged to home with outpatient therapy.    Progress towards therapy goal(s). See goals on Care Plan in [facility] electronic health record for goal details.  Goals partially met.  Barriers to achieving goals:   discharge from facility.    Therapy recommendation(s):    Continued therapy is recommended.  Rationale/Recommendations:  Per last OT who worked with pt, recommending home with assist and OP cardiac rehab to progress ADL IND/safety and overall endurance.

## 2021-11-14 NOTE — PROGRESS NOTES
Clinic Care Coordination Contact  [facility]/Voicemail       Clinical Data: Care Coordinator Outreach  Reason for referral: TCM outreach  Outreach attempted x 1.  Left message on patient's voicemail with call back information and requested return call.  Plan:  Care Coordinator will try to reach patient again in 1-2 business days.       Shelby Person  Community Health Worker  [facility]  Ph: [phone]

## 2021-11-15 NOTE — PROGRESS NOTES
Clinic Care Coordination Contact  [facility]/Voicemail    Clinical Data: Care Coordinator Outreach  Reason for referral: TCM outreach  Outreach attempted x 2.  Left message on patient's voicemail with call back information and requested return call.  Plan Care Coordinator will do no further outreaches at this time.    Rocio Mckenzie   Community Health Worker   Connected Care Resource [facility]

## 2021-11-15 NOTE — TELEPHONE ENCOUNTER
Health Call Center    Phone Message    May a detailed message be left on voicemail: yes     Reason for Call: Appointment Intake    Referring Provider Name: Karla Ragland PA-C  Diagnosis and/or Symptoms: Idiopathic chronic gout with tophus, unspecified site [M1A.00X1]    Patient was seen in the hospital by Dr. Weiss and Dr. Muse. Per Dr. Weiss's note patient should follow up in 3-4 weeks. Please advise on scheduling.    Action Taken: Message routed to:  [facility] Rheum    Travel Screening: Not Applicable

## 2021-11-15 NOTE — PROGRESS NOTES
"  Assessment & Plan     Nonischemic cardiomyopathy (H)  Currently on Milrinone 0.25 mcg/kg/min and Torsemide 50 mg BID. He has an appointment with cardiology tomorrow who will be managing his heart failure. He will have a BNP and BMP obtained today.       Screen for colon cancer   - Adult Gastro Ref - Procedure Only; Future    Chronic gout of wrist, unspecified cause, unspecified laterality  Refills were given of his Allopurinol and colchicine. He has already been given referrals for rheumatology as well as surgery for further management of his chronic gout.   - allopurinol (ZYLOPRIM) 100 MG tablet; Take 1 tablet (100 mg) by mouth daily  - colchicine (COLCYRS) 0.6 MG tablet; Take 1 tablet (0.6 mg) by mouth daily       BMI:   Estimated body mass index is 26.6 kg/m  as calculated from the following:    Height as of 11/2/21: 1.854 m (6' 1\").    Weight as of this encounter: 91.4 kg (201 lb 9.6 oz).     Depression Screening Follow Up    PHQ 11/16/2021   PHQ-9 Total Score 15   Q9: Thoughts of better off dead/self-harm past 2 weeks Not at all     Physician Attestation   I, Jeevan Foster, was present with the medical/SUNITHA student who participated in the service and in the documentation of the note.  I have verified the history and personally performed the physical exam and medical decision making.  I agree with the assessment and plan of care as documented in the note.     Jeevan Foster PA-C  [facility]    Jhon Jacobson is a 56 year old who presents for the following health issues  accompanied by his Self.      SANDRA Jacobson is a 56 year old male with a PMHx of NICM (baseline EF 20%) who was recently admitted for acute on chronic systolic heart failure (EF 10% on 11/1) and an PARUL (discharge Cr 1.11, EGFR 75) with a who presents for follow up and to establish primary care. The patient has felt quite fatigued and winded since leaving the hospital. He states this has improved somewhat since " discharge but he is not happy with the slow progress. He has been wearing an external defibrillator since discharge and he feels like the vest makes it more difficult to catch his breath. The patient was started on Milrinone in the hospital and has continued this as an outpatient via PICC line. He has been very strictly monitoring his salt intake and his weight which has been stable. The patient takes his blood pressure daily which have been good. He has not had any new swelling. The patient has been [place] [facility] and has an appointment tomorrow for further management of his heart failure.     The patient moved to [place] from [place] 1 month ago. He has been dealing with gout since he was in his 20's, currently taking Allopurinol and Colchicine daily and Prednisone PRN for flares. He is currently running low on these daily medications.     The patient noted some blood in his stool yesterday, with small amounts in the toilet and also on the toilet paper. Earlier this year he was seen in [place] for what he states is very significant GI bleeding. He was scheduled for a colonoscopy but never got this because the bleeding improved.       Answers for HPI/ROS submitted by the patient on 11/16/2021  If you checked off any problems, how difficult have these problems made it for you to do your work, take care of things at home, or get along with other people?: Extremely difficult  PHQ9 TOTAL SCORE: 15        Hospital Follow-up Visit:    Hospital/Nursing Home/IP Rehab Facility: [facility]  Date of Admission: 11/01/2021  Date of Discharge: 11/12/2021  Reason(s) for Admission: - Acute on Chronic systolic heart failure secondary to NICM (LVEF <10% per TTE 11/2/21) with ambulatory cardiogenic shock  - Polysubstance abuse  - PARUL  - Hyponatremia  - DMII  - Gout  - Anxiety/depression      Was your hospitalization related to COVID-19? No   Problems taking medications  regularly:  Yes  Medication changes since discharge: None  Problems adhering to non-medication therapy:  None    Summary of hospitalization:  [facility] discharge summary reviewed  Diagnostic Tests/Treatments reviewed.  Other Healthcare Providers Involved in Patient s Care:         Homecare, Care Coordination and Specialist appointment   Update since discharge: improved.           Review of Systems   Constitutional, HEENT, cardiovascular, pulmonary, gi and gu systems are negative, except as otherwise noted.      Objective    /73 (BP Location: Left arm, Patient Position: Sitting)   Pulse 63   Temp 97.4  F (36.3  C) (Oral)   Wt 201 lb 9.6 oz (91.4 kg)   BMI 26.60 kg/m    Body mass index is 26.6 kg/m .  Physical Exam   GENERAL: healthy, alert and no distress, ambulating with cane  NECK: no adenopathy, no asymmetry, masses, or scars and thyroid normal to palpation  RESP: lungs clear to auscultation - no rales, rhonchi or wheezes  CV: regular rate and rhythm, normal S1 S2, no S3 or S4, no murmur, click or rub, no peripheral edema and peripheral pulses strong  ABDOMEN: soft, nontender, no hepatosplenomegaly, no masses and bowel sounds normal  MS: no gross musculoskeletal defects noted, gouty tophus noted on the right wrist.

## 2021-11-15 NOTE — TELEPHONE ENCOUNTER
RECORDS RECEIVED FROM: R hand gouty tophi    DATE RECEIVED: Dec 6, 2021     NOTES STATUS DETAILS   OFFICE NOTE from referring provider Internal Karla Ragland PA-C   MEDICATION LIST Internal    XRAYS (IMAGES & REPORTS) Internal 11/6/21

## 2021-11-16 NOTE — PROGRESS NOTES
This is a recent snapshot of the patient's [facility] medical record.  For current drug dose and complete information and questions, call [phone] or In Basket pool, fv home infusion ([id number])  CSN Number:  [id number]

## 2021-11-17 NOTE — PROGRESS NOTES
This is a recent snapshot of the patient's [facility] medical record.  For current drug dose and complete information and questions, call [phone] or In Basket pool, fv home infusion ([id number])  CSN Number:  [id number]

## 2021-11-17 NOTE — PATIENT INSTRUCTIONS
Take your medicines every day, as directed    Changes made today:  o Labs today before you leave  o Call cardiac rehab to reschedule to another date and location you prefer. Please call [phone] to do this   Monitor Your Weight and Symptoms    Contact us if you:      Gain 2 pounds in one day or 5 pounds in one week    Feel more short of breath    Notice more leg swelling    Feel lightheadeded   Change your lifestyle    Limit Salt or Sodium:    2000 mg  Limit Fluids:    2000 mL or approximately 64 ounces  Eat a Heart Healthy Diet    Low in saturated fats  Stay Active:    Aim to move at least 150 minutes every  week         To Contact us    During Business Hours:  [phone], option # 1 (University)  Then option # 4 (medical questions)     After hours, weekends or holidays:   [phone], Option #4  Ask to speak to the On-Call Cardiologist. Inform them you are a CORE/heart failure patient at the [facility].     Use [facility] allows you to communicate directly with your heart team through secure messaging.    [employer] can be accessed any time on your phone, computer, or tablet.    If you need assistance, we'd be happy to help!         Keep your Heart Appointments:    Follow up with your cardiac appointments as scheduled

## 2021-11-17 NOTE — NURSING NOTE
Chief Complaint   Patient presents with     New Patient     Core      Vitals were taken and medications were reconciled.   Paulo Vasquez, MARY ELLEN  7:00 AM

## 2021-11-17 NOTE — PROGRESS NOTES
Outpatient Advanced Heart Failure Therapy Referral:  Date:  11-12-21    Patient referred for Advanced Therapies by Gabrielle Ragland.    Chart reviewed and the following barriers for transplant were noted in the chart or in discussion with the patient.  Further information and/or follow up will be needed prior to transplant:    Hx of polysubstance abuse       Evaluation Status:  Start with social work consult and neurospych.  Will then proceed with full evaluation if recommended by Dr. Franklin.       Plan: Will contact patient if the process for full evaluation will move forward.

## 2021-11-17 NOTE — PROGRESS NOTES
Advanced Heart Failure Clinic -- CORE Evaluation  November 17, 2021    HPI:   Mr. Kavon Banerjee is a 56yr old male with a longstanding history of chronic systolic heart failure secondary to NICM (LVEF 5-10%, LVIDd 8.1cm per TTE 11/2/21), polysubstance abuse (remote cocaine use, former heavy alcohol use, meth use within the last year), newly diagnosed DMII, chronic gout with painful tophi, and anxiety/depression who presents to [facility] for evaluation and for follow-up of recent hospitalization.    Has a longstanding history of NICM, thought to be secondary to polysubstance abuse (meth, EtOH).  He has previously been living in [place], where he reported feeling well until about 2 months ago when he developed progressive exertional intolerance, PND/orthopnea, abdominal distention, and edema.  He was recently hospitalized at [facility], where he was found to be in cardiogenic shock requiring inotropic support.  He left AMA on 11/1, but represented to [facility] ED later that day due to progressively worsening symptoms.  His decline was attributed to his inotrope wean, not optimized medical therapy, and nonadherence to diet and fluid restrictions.  He was treated for his heart failure with diuresis and started on inotropes.  He was initially started on dobutamine but then switched to milrinone as there is some evidence that suggests better outcomes on milrinone as a home inotrope when compared to dobutamine.  His milrinone was increased from 0.125 to 0.25mcg/kg/min, with significant improvement in his heart failure symptoms.  He did not have any ectopy on milrinone therapy.  He was trialed on some neurohormonal blockade while in the hospital (Entresto), which he did not tolerate due to hypotension.  He ultimately discharged at a weight of 195# on 11/12, and presents today for follow-up.    Since discharge, he states that he was doing okay initially.  He became more short of breath last night, which started at approximately 2 AM.  He  "now feels tightness in his chest.  He did not sleep much, stating that he tried to sit and prop himself in order to sleep better.  He has had similar episodes as this since discharge, which he initially attributed to his LifeVest being too tight.  He called [facility], who was able to provide a bigger vest, which helped a lot.  His breathing remains labored, but he does feel like it has improved since hospital discharge.  He describes his current chest tightness as being right in the front of his chest, when he takes a deep breath.  This occurred when he was in the hospital, and has happened in the past when he \"had edema\" and \"bascially when medicine stops working.\"  He does feel overall improvement since starting the milrinone.  His BPs at home have been ranging 101-104/60-70s.  He is unsure what his prior history of BPs have been.  He is not exercising much, noting that he is walking very little.  He thinks he can walk 1-2 blocks before becoming short of breath.  He states that this is worse than where he was at 3 months ago, but is better than when compared to where he was at in the last month.  He feels poor strength and endurance.  His appetite has been okay, and he is eating 2 meals per day.  He is trying to limit his salt and fluid intake.  He denies nausea, vomiting, diarrhea, constipation.  His weight has been stable, ranging 194-196# per home scale.  He is unsure if he is retaining fluid.  He has noted some bloody stools last couple of days, when wiping.  He states that the blood has not been in the stool nor around the stool, and has been bright red.  He has ongoing cataract issues.  He has not been Covid vaccinated.  He has not received the flu shot.  He believes he had a pneumonia shot a few years ago, and then states that he ended up hospitalized that same day.  He otherwise denies palpitations, dizziness, falls, headaches, acute vision changes, fevers, chills, cough, sore throat, and other signs of " bleeding.      PAST MEDICAL HISTORY:  No past medical history on file.    FAMILY HISTORY:  No family history on file.    SOCIAL HISTORY:  Social History     Socioeconomic History     Marital status:      Spouse name: Not on file     Number of children: Not on file     Years of education: Not on file     Highest education level: Not on file   Occupational History     Not on file   Tobacco Use     Smoking status: Never Smoker     Smokeless tobacco: Never Used   Substance and Sexual Activity     Alcohol use: Not Currently     Comment: used to be a heavy drinker up until 50     Drug use: Not on file     Sexual activity: Not on file   Other Topics Concern     Not on file   Social History Narrative     Not on file     Social Determinants of Health     Financial Resource Strain: Not on file   Food Insecurity: Not on file   Transportation Needs: Not on file   Physical Activity: Not on file   Stress: Not on file   Social Connections: Not on file   Intimate Partner Violence: Not on file   Housing Stability: Not on file       CURRENT MEDICATIONS:  Current Outpatient Medications   Medication Sig Dispense Refill     acetaminophen (TYLENOL) 325 MG tablet Take 2 tablets (650 mg) by mouth every 4 hours as needed for mild pain       allopurinol (ZYLOPRIM) 100 MG tablet Take 1 tablet (100 mg) by mouth daily 90 tablet 1     alum & mag hydroxide-simethicone (MAALOX) 200-200-20 MG/5ML SUSP suspension Take 30 mLs by mouth every 4 hours as needed for indigestion 355 mL 0     colchicine (COLCYRS) 0.6 MG tablet Take 1 tablet (0.6 mg) by mouth daily 90 tablet 1     escitalopram (LEXAPRO) 10 MG tablet Take 1 tablet (10 mg) by mouth At Bedtime 30 tablet 1     magnesium oxide (MAG-OX) 400 MG tablet Take 1 tablet (400 mg) by mouth daily 30 tablet 1     milrinone (PRIMACOR) infusion 200 mcg/mL PREMIX Inject 22.275 mcg/min into the vein continuous       polyethylene glycol (MIRALAX) 17 GM/Dose powder Take 17 g by mouth 2 times daily as  "needed       potassium chloride ER (KLOR-CON M) 20 MEQ CR tablet Take 2 tablets (40 mEq) by mouth 2 times daily 120 tablet 1     ramelteon (ROZEREM) 8 MG tablet Take 1 tablet (8 mg) by mouth every evening 30 tablet 0     senna-docusate (SENOKOT-S/PERICOLACE) 8.6-50 MG tablet Take 1 tablet by mouth 2 times daily as needed for constipation       torsemide (DEMADEX) 10 MG tablet Take 60 mg of torsemide in the morning and 40 mg of torsemide in the evening 150 tablet 1       ROS:   Constitutional: No fever, chills, or sweats.  Stable weight since hospital discharge.   ENT: No visual disturbance, ear ache, epistaxis, sore throat.   Allergies/Immunologic: Negative.   Respiratory: As per HPI.   Cardiovascular: As per HPI.   GI: As per HPI  : No urinary frequency, dysuria, or hematuria.   Integument: Negative.   Psychiatric: Negative.   Neuro: Negative.   Endocrinology: Negative.   Musculoskeletal: As per HPI.    EXAM:  /73 (BP Location: Left arm, Patient Position: Chair, Cuff Size: Adult Regular)   Pulse 97   Ht 1.854 m (6' 1\")   Wt 92 kg (202 lb 12.8 oz)   SpO2 98%   BMI 26.76 kg/m    General: appears comfortable, alert and articulate  Head: normocephalic, atraumatic  Eyes: anicteric sclera, EOMI  Neck: no adenopathy  Orophyarynx: moist mucosa, no lesions, dentition intact  Heart: regular, S1/S2, no murmur, gallop, rub, JVD above clavicle when sitting upright  Lungs: clear, no rales or wheezing  Abdomen: soft, non-tender, bowel sounds present, no hepatosplenomegaly  Extremities: no clubbing, cyanosis or LE edema  Neurological: normal speech and affect, no gross motor deficits  Integument: No open lesions, rashes, or jaundice    Labs:  CBC RESULTS:  Lab Results   Component Value Date    WBC 6.7 11/12/2021    RBC 4.76 11/12/2021    HGB 10.9 (L) 11/12/2021    HCT 37.1 (L) 11/12/2021    MCV 78 11/12/2021    MCH 22.9 (L) 11/12/2021    MCHC 29.4 (L) 11/12/2021    RDW 22.3 (H) 11/12/2021     11/12/2021 "       CMP RESULTS:  Lab Results   Component Value Date     11/12/2021    POTASSIUM 4.0 11/12/2021    CHLORIDE 105 11/12/2021    CO2 29 11/12/2021    ANIONGAP 4 11/12/2021    GLC 88 11/12/2021    BUN 12 11/12/2021    CR 1.11 11/12/2021    GFRESTIMATED 75 11/12/2021    JESSICA 8.8 11/12/2021    BILITOTAL 0.5 11/12/2021    ALBUMIN 2.7 (L) 11/12/2021    ALKPHOS 109 11/12/2021    ALT 13 11/12/2021    AST 11 11/12/2021        INR RESULTS:  Lab Results   Component Value Date    INR 1.22 (H) 11/08/2021       Lab Results   Component Value Date    MAG 2.0 11/12/2021     Lab Results   Component Value Date    NTBNPI 3,891 (H) 11/01/2021     Lab Results   Component Value Date    NTBNP 11,110 (H) 11/16/2021       Assessment and Plan:   Mr. Kavon Banerjee is a 56yr old male with a longstanding history of chronic systolic heart failure secondary to NICM (LVEF 5-10%, LVIDd 8.1cm per TTE 11/2/21), polysubstance abuse (remote cocaine use, former heavy alcohol use, meth use within the last year), newly diagnosed DMII, chronic gout with painful tophi, and anxiety/depression who [place] [facility] [place] and for follow-up of recent hospitalization.    He had labs drawn yesterday, which are not yet available for review.    Mr. Banerjee appears stable today.  His blood pressures are controlled.  His weight trend, per his home scale, remain stable, and he appears euvolemic today.    He notes chest tightness and shortness of breath today.  He does not appear to be fluid overloaded, so asked that he call if his symptoms were to persist or worsen.    Will refer him to cardiac rehab, with the hopes of getting him into more regular exercise/physical activity.    Given the bright red blood he has seen when wiping after a stool, advised that he follow-up with his PCP re: possible hemorrhoids.  He notes that he also needs to have a colonoscopy done.    We will plan for him to follow-up in [facility] [place] as scheduled on 11/26.  Asked that he call with any  "new concerns prior to this date.      Chronic systolic heart failure secondary to NICM (LVEF <10% per TTE 11/2/21)  Stage D, NYHA Class III  - ACEi/ARB/ARNi:  entresto was held during hospitalization due to hypotension, defer restarting it now due to lower blood pressures today.  - Afterload reduction:  n/a  - BB:  Deferred due to cardiogenic shock  - Aldosterone antagonist: Deferred while other medications are prioritized.  Has been on spironolactone with good tolerance in the past.  - SGLT2i:  Deferred for now, will consider at future appts  - Inotropy: milrinone @ 0.25mcg/kg/min.   - SCD ppx:  Lifevest  - Fluid status:   Appears euvolemic, continue torsemide 60/40mg      Polysubstance abuse  History of EtOH abuse as well as prior polysubstance abuse (reports prior use of cocaine, methamphetamine, mushrooms, LSD).  Has had recent meth use within the last month.  He reports that he has his EtOH under control, but does still \"sip\" on liquor at times when out with friends.  Drug screening and PETH were negative on 11/2.  He has not completed formal chemical dependency treatment recently.  He has been informed that he will need to complete a chemical dependency program in order to be considered for advanced heart failure therapies in the future.  Reinforced complete abstinence from substances.  Neuropych and social work teams following, to help determine length of sobriety needed.  Will random drug screening per protocol -- discussed today.     DMII  Elevated HgbA1c, has not required insulin.  Managed by PCP.     Gout  Reports extremely longstanding history of gout, and has been taking taking allopurinol and colchicine.  Has painful tophi on his wrist, which he would like removed.  Needs to follow-up with rheumatology and general surgery for further consideration.    Anxiety/depression  Psychiatry consulted while inpatient on 11/4, and he was started on Lexapro 10mg daily.  Note that he did not tolerate hydroxyzine " and melatonin.  Continue ramelteon, further management per PCP/psychiatry.       The above was reviewed with Mr. Banerjee, who verbalized understanding and will call with further questions/concerns.    60+ minutes spent with patient, along with preparing for visit, reviewing follow up, and completing documentation.      Chastity Goldman DNP, FNP-BC, CHFN  Advanced Heart Failure Nurse Practitioner  [facility]  Patient Care Team:  No Ref-Primary, Physician as PCP - Zoe Mendez, RN as Specialty Care Coordinator (Cardiology)  Chastity Goldman NP as Nurse Practitioner (Interventional Cardiology)

## 2021-11-17 NOTE — LETTER
11/17/2021      RE: Kavon Banerjee  [address]  [facility] [address]       Dear Colleague,    Thank you for the opportunity to participate in the care of your patient, Kavon Banerjee, at the [facility] [place] [facility]. Please see a copy of my visit note below.    Advanced Heart Failure Clinic -- CORE Evaluation  November 17, 2021    HPI:   Mr. Kavon Banerjee is a 56yr old male with a longstanding history of chronic systolic heart failure secondary to NICM (LVEF 5-10%, LVIDd 8.1cm per TTE 11/2/21), polysubstance abuse (remote cocaine use, former heavy alcohol use, meth use within the last year), newly diagnosed DMII, chronic gout with painful tophi, and anxiety/depression who [place] [facility] [place] and for follow-up of recent hospitalization.    Has a longstanding history of NICM, thought to be secondary to polysubstance abuse (meth, EtOH).  He has previously been living in [place], where he reported feeling well until about 2 months ago when he developed progressive exertional intolerance, PND/orthopnea, abdominal distention, and edema.  He was recently hospitalized at [facility], where he was found to be in cardiogenic shock requiring inotropic support.  He left AMA on 11/1, but represented to [facility] ED later that day due to progressively worsening symptoms.  His decline was attributed to his inotrope wean, not optimized medical therapy, and nonadherence to diet and fluid restrictions.  He was treated for his heart failure with diuresis and started on inotropes.  He was initially started on dobutamine but then switched to milrinone as there is some evidence that suggests better outcomes on milrinone as a home inotrope when compared to dobutamine.  His milrinone was increased from 0.125 to 0.25mcg/kg/min, with significant improvement in his heart failure symptoms.  He did not have any ectopy on milrinone therapy.  He was  "trialed on some neurohormonal blockade while in the hospital (Entresto), which he did not tolerate due to hypotension.  He ultimately discharged at a weight of 195# on 11/12, and presents today for follow-up.    Since discharge, he states that he was doing okay initially.  He became more short of breath last night, which started at approximately 2 AM.  He now feels tightness in his chest.  He did not sleep much, stating that he tried to sit and prop himself in order to sleep better.  He has had similar episodes as this since discharge, which he initially attributed to his LifeVest being too tight.  He called [employer], who was able to provide a bigger vest, which helped a lot.  His breathing remains labored, but he does feel like it has improved since hospital discharge.  He describes his current chest tightness as being right in the front of his chest, when he takes a deep breath.  This occurred when he was in the hospital, and has happened in the past when he \"had edema\" and \"bascially when medicine stops working.\"  He does feel overall improvement since starting the milrinone.  His BPs at home have been ranging 101-104/60-70s.  He is unsure what his prior history of BPs have been.  He is not exercising much, noting that he is walking very little.  He thinks he can walk 1-2 blocks before becoming short of breath.  He states that this is worse than where he was at 3 months ago, but is better than when compared to where he was at in the last month.  He feels poor strength and endurance.  His appetite has been okay, and he is eating 2 meals per day.  He is trying to limit his salt and fluid intake.  He denies nausea, vomiting, diarrhea, constipation.  His weight has been stable, ranging 194-196# per home scale.  He is unsure if he is retaining fluid.  He has noted some bloody stools last couple of days, when wiping.  He states that the blood has not been in the stool nor around the stool, and has been bright red.  He has " ongoing cataract issues.  He has not been Covid vaccinated.  He has not received the flu shot.  He believes he had a pneumonia shot a few years ago, and then states that he ended up hospitalized that same day.  He otherwise denies palpitations, dizziness, falls, headaches, acute vision changes, fevers, chills, cough, sore throat, and other signs of bleeding.      PAST MEDICAL HISTORY:  No past medical history on file.    FAMILY HISTORY:  No family history on file.    SOCIAL HISTORY:  Social History     Socioeconomic History     Marital status:      Spouse name: Not on file     Number of children: Not on file     Years of education: Not on file     Highest education level: Not on file   Occupational History     Not on file   Tobacco Use     Smoking status: Never Smoker     Smokeless tobacco: Never Used   Substance and Sexual Activity     Alcohol use: Not Currently     Comment: used to be a heavy drinker up until 50     Drug use: Not on file     Sexual activity: Not on file   Other Topics Concern     Not on file   Social History Narrative     Not on file     Social Determinants of Health     Financial Resource Strain: Not on file   Food Insecurity: Not on file   Transportation Needs: Not on file   Physical Activity: Not on file   Stress: Not on file   Social Connections: Not on file   Intimate Partner Violence: Not on file   Housing Stability: Not on file       CURRENT MEDICATIONS:  Current Outpatient Medications   Medication Sig Dispense Refill     acetaminophen (TYLENOL) 325 MG tablet Take 2 tablets (650 mg) by mouth every 4 hours as needed for mild pain       allopurinol (ZYLOPRIM) 100 MG tablet Take 1 tablet (100 mg) by mouth daily 90 tablet 1     alum & mag hydroxide-simethicone (MAALOX) 200-200-20 MG/5ML SUSP suspension Take 30 mLs by mouth every 4 hours as needed for indigestion 355 mL 0     colchicine (COLCYRS) 0.6 MG tablet Take 1 tablet (0.6 mg) by mouth daily 90 tablet 1     escitalopram (LEXAPRO)  "10 MG tablet Take 1 tablet (10 mg) by mouth At Bedtime 30 tablet 1     magnesium oxide (MAG-OX) 400 MG tablet Take 1 tablet (400 mg) by mouth daily 30 tablet 1     milrinone (PRIMACOR) infusion 200 mcg/mL PREMIX Inject 22.275 mcg/min into the vein continuous       polyethylene glycol (MIRALAX) 17 GM/Dose powder Take 17 g by mouth 2 times daily as needed       potassium chloride ER (KLOR-CON M) 20 MEQ CR tablet Take 2 tablets (40 mEq) by mouth 2 times daily 120 tablet 1     ramelteon (ROZEREM) 8 MG tablet Take 1 tablet (8 mg) by mouth every evening 30 tablet 0     senna-docusate (SENOKOT-S/PERICOLACE) 8.6-50 MG tablet Take 1 tablet by mouth 2 times daily as needed for constipation       torsemide (DEMADEX) 10 MG tablet Take 60 mg of torsemide in the morning and 40 mg of torsemide in the evening 150 tablet 1       ROS:   Constitutional: No fever, chills, or sweats.  Stable weight since hospital discharge.   ENT: No visual disturbance, ear ache, epistaxis, sore throat.   Allergies/Immunologic: Negative.   Respiratory: As per HPI.   Cardiovascular: As per HPI.   GI: As per HPI  : No urinary frequency, dysuria, or hematuria.   Integument: Negative.   Psychiatric: Negative.   Neuro: Negative.   Endocrinology: Negative.   Musculoskeletal: As per HPI.    EXAM:  /73 (BP Location: Left arm, Patient Position: Chair, Cuff Size: Adult Regular)   Pulse 97   Ht 1.854 m (6' 1\")   Wt 92 kg (202 lb 12.8 oz)   SpO2 98%   BMI 26.76 kg/m    General: appears comfortable, alert and articulate  Head: normocephalic, atraumatic  Eyes: anicteric sclera, EOMI  Neck: no adenopathy  Orophyarynx: moist mucosa, no lesions, dentition intact  Heart: regular, S1/S2, no murmur, gallop, rub, JVD above clavicle when sitting upright  Lungs: clear, no rales or wheezing  Abdomen: soft, non-tender, bowel sounds present, no hepatosplenomegaly  Extremities: no clubbing, cyanosis or LE edema  Neurological: normal speech and affect, no gross motor " deficits  Integument: No open lesions, rashes, or jaundice    Labs:  CBC RESULTS:  Lab Results   Component Value Date    WBC 6.7 11/12/2021    RBC 4.76 11/12/2021    HGB 10.9 (L) 11/12/2021    HCT 37.1 (L) 11/12/2021    MCV 78 11/12/2021    MCH 22.9 (L) 11/12/2021    MCHC 29.4 (L) 11/12/2021    RDW 22.3 (H) 11/12/2021     11/12/2021       CMP RESULTS:  Lab Results   Component Value Date     11/12/2021    POTASSIUM 4.0 11/12/2021    CHLORIDE 105 11/12/2021    CO2 29 11/12/2021    ANIONGAP 4 11/12/2021    GLC 88 11/12/2021    BUN 12 11/12/2021    CR 1.11 11/12/2021    GFRESTIMATED 75 11/12/2021    JESSICA 8.8 11/12/2021    BILITOTAL 0.5 11/12/2021    ALBUMIN 2.7 (L) 11/12/2021    ALKPHOS 109 11/12/2021    ALT 13 11/12/2021    AST 11 11/12/2021        INR RESULTS:  Lab Results   Component Value Date    INR 1.22 (H) 11/08/2021       Lab Results   Component Value Date    MAG 2.0 11/12/2021     Lab Results   Component Value Date    NTBNPI 3,891 (H) 11/01/2021     Lab Results   Component Value Date    NTBNP 11,110 (H) 11/16/2021       Assessment and Plan:   Mr. Kavon Banerjee is a 56yr old male with a longstanding history of chronic systolic heart failure secondary to NICM (LVEF 5-10%, LVIDd 8.1cm per TTE 11/2/21), polysubstance abuse (remote cocaine use, former heavy alcohol use, meth use within the last year), newly diagnosed DMII, chronic gout with painful tophi, and anxiety/depression who [place] [facility] and for follow-up of recent hospitalization.    He had labs drawn yesterday, which are not yet available for review.    Mr. Banerjee appears stable today.  His blood pressures are controlled.  His weight trend, per his home scale, remain stable, and he appears euvolemic today.    He notes chest tightness and shortness of breath today.  He does not appear to be fluid overloaded, so asked that he call if his symptoms were to persist or worsen.    Will refer him to cardiac rehab, with the hopes of  "getting him into more regular exercise/physical activity.    Given the bright red blood he has seen when wiping after a stool, advised that he follow-up with his PCP re: possible hemorrhoids.  He notes that he also needs to have a colonoscopy done.    We will plan for him to follow-up in core as scheduled on 11/26.  Asked that he call with any new concerns prior to this date.      Chronic systolic heart failure secondary to NICM (LVEF <10% per TTE 11/2/21)  Stage D, NYHA Class III  - ACEi/ARB/ARNi:  entresto was held during hospitalization due to hypotension, defer restarting it now due to lower blood pressures today.  - Afterload reduction:  n/a  - BB:  Deferred due to cardiogenic shock  - Aldosterone antagonist: Deferred while other medications are prioritized.  Has been on spironolactone with good tolerance in the past.  - SGLT2i:  Deferred for now, will consider at future appts  - Inotropy: milrinone @ 0.25mcg/kg/min.   - SCD ppx:  Lifevest  - Fluid status:   Appears euvolemic, continue torsemide 60/40mg      Polysubstance abuse  History of EtOH abuse as well as prior polysubstance abuse (reports prior use of cocaine, methamphetamine, mushrooms, LSD).  Has had recent meth use within the last month.  He reports that he has his EtOH under control, but does still \"sip\" on liquor at times when out with friends.  Drug screening and PETH were negative on 11/2.  He has not completed formal chemical dependency treatment recently.  He has been informed that he will need to complete a chemical dependency program in order to be considered for advanced heart failure therapies in the future.  Reinforced complete abstinence from substances.  Neuropych and social work teams following, to help determine length of sobriety needed.  Will random drug screening per protocol -- discussed today.     DMII  Elevated HgbA1c, has not required insulin.  Managed by PCP.     Gout  Reports extremely longstanding history of gout, and has been " taking taking allopurinol and colchicine.  Has painful tophi on his wrist, which he would like removed.  Needs to follow-up with rheumatology and general surgery for further consideration.    Anxiety/depression  Psychiatry consulted while inpatient on 11/4, and he was started on Lexapro 10mg daily.  Note that he did not tolerate hydroxyzine and melatonin.  Continue ramelteon, further management per PCP/psychiatry.       The above was reviewed with Drew Chriss, who verbalized understanding and will call with further questions/concerns.    60+ minutes spent with patient, along with preparing for visit, reviewing follow up, and completing documentation.      Chastity Goldman DNP, FNP-BC, CHFN  Advanced Heart Failure Nurse Practitioner  [facility]  Patient Care Team:  No Ref-Primary, Physician as PCP - Zoe Mendez RN as Specialty Care Coordinator (Cardiology)  Chastity Goldman NP as Nurse Practitioner (Interventional Cardiology)

## 2021-11-17 NOTE — TELEPHONE ENCOUNTER
Yes, it is on the protocol however it is a hospital follow up that Dr. Weiss said to follow up in 3-4 weeks. Should this be scheduled as a return? Patient saw Dr. Weiss and Dr. Muse which provider should patient be scheduled with. Please advise.

## 2021-11-18 NOTE — TELEPHONE ENCOUNTER
Returned call to Kavon. HE states he has ongoing labored breathing - has been going on for a few days. States it is about the same as yesterday but last night was worse. He couldn't lay down due to his breathing, he was coughing a lot. State he is not coughing anything up. No other ill symptoms but state cough seems 'wet.' reports his weight has been steady, then tells me he hasn't been able to get on the scale yet today. States chest pressure remains, although improved after taking off life vest. He states he has no swelling in his legs or bloating in the abdomen. Reports he has continued extreme fatigue, states he cannot even stand at the stove due to fatigue and breathing isssues. States this morning he went down the stairs and was fatigued from this. He states no issues noted with his milrinone infusion. No pump alarms noted. He would really like to avoid ER if possible, but notes his breathing is very bothersome to him and he will need to go in if it doesn't get better.   Date: 11/18/2021    Time of Call: 9:27 AM     Diagnosis:  Heart failure     [ VORB ] Ordering provider: Chastity Goldman NP    Order: increase torsemide to 80mg BID today. If not feeling better by this afternoon/evening, he needs to go to ER for further evaluation.     Order received by: Zoe Suarez RN     Follow-up/additional notes: order placed. Called Kavon to discuss. He states understanding. We reviewed that if he isn't feeling better or is feeling worse, he needs to go to the emergency room. Discussed that we would try this, but if not getting better, then symptoms likely not due to fluid status and he needs further evaluation to determine causation of his symptoms so we can treat him properly. Discussed that if he feels worse, he needs to go emergently to the ER for evaluation. Kavon stated understanding. HE has not yet taken his morning medications. I asked him to please take these now and he said he would do that. He will also  check his BP and weight now. Asked him to call me if these are not within his normal readings. Kavon stated no additional questions at this time.

## 2021-11-18 NOTE — TELEPHONE ENCOUNTER
Pt reporting, he did not get any sleep last night due to coughing and shortness of breath throughout the night.        Pt feels like he has a weight in the middle of his chest.   Pt is having a lot of short of breath.   Feels really fatigued.    Gets winded easy and going down a flight of stairs is exhausting.     Pt took off his lift vest with the external defibrillator on it because it was to heavy on his chest.    No fever or cold symptoms noted. No nausea or vomiting noted. Last bowel movement was yesterday soft normal brown to form.   No issues with urinary.    No edema in the lower extremities.   No chest pain.  But he feels like he has a weight in the middle of his chest.    Increased shortness of breath.   Increased fatigue.   Very short of breath.  Felt like he was gasping for air.   Pt was coughing a lot last night and did not get any sleep.       Pt has burped a couple of times really big burps which seems to help reduce  the pressure.   But then the pressure in his chest comes back.      Pt refused ER.   Would like a call back from the clinic.    Please call Pt back @ [phone] for further assistance.      Karina Short RN  Central Triage Red Flags/Med Refills          Reason for Disposition    Patient wants to be seen    Additional Information    Negative: Breathing stopped and hasn't returned    Negative: Choking on something    Negative: SEVERE difficulty breathing (e.g., struggling for each breath, speaks in single words, pulse > 120)    Negative: Bluish (or gray) lips or face    Negative: Difficult to awaken or acting confused (e.g., disoriented, slurred speech)    Negative: Passed out (i.e., fainted, collapsed and was not responding)    Negative: Wheezing started suddenly after medicine, an allergic food, or bee sting    Negative: Stridor    Negative: Slow, shallow and weak breathing    Negative: Sounds like a life-threatening emergency to the triager    Negative: Chest pain    Negative: Wheezing  "(high pitched whistling sound) and previous asthma attacks or use of asthma medicines    Negative: Difficulty breathing and only present when coughing    Negative: Difficulty breathing and only from stuffy or runny nose    Negative: Difficulty breathing and within 14 days of COVID-19 Exposure    Negative: MODERATE difficulty breathing (e.g., speaks in phrases, SOB even at rest, pulse 100-120) of new onset or worse than normal    Negative: Wheezing can be heard across the room    Negative: Drooling or spitting out saliva (because can't swallow)    Negative: Any history of prior \"blood clot\" in leg or lungs    Negative: Illness requiring prolonged bedrest in past month (e.g., immobilization, long hospital stay)    Negative: Hip or leg fracture (broken bone) in past month (or had cast on leg or ankle in past month)    Negative: Major surgery in the past month    Negative: Long-distance travel in past month (e.g., car, bus, train, plane; with trip lasting 6 or more hours)    Negative: Extra heart beats OR irregular heart beating   (i.e., \"palpitations\")    Negative: Fever > 103 F (39.4 C)    Negative: Fever > 101 F (38.3 C) and over 60 years of age    Negative: Fever > 100.0 F (37.8 C) and bedridden (e.g., nursing home patient, stroke, chronic illness, recovering from surgery)    Negative: Fever > 100.0 F (37.8 C) and diabetes mellitus or weak immune system (e.g., HIV positive, cancer chemo, splenectomy, organ transplant, chronic steroids)    Negative: Periods where breathing stops and then resumes normally and bedridden (e.g., nursing home patient, CVA)    Negative: Pregnant or postpartum (from 0 to 6 weeks after delivery)    Negative: Patient sounds very sick or weak to the triager    Negative: MILD difficulty breathing (e.g., minimal/no SOB at rest, SOB with walking, pulse < 100) of new onset or worse than normal    Negative: Longstanding difficulty breathing (e.g., CHF, COPD, emphysema) and worse than normal    " Negative: Longstanding difficulty breathing and not responding to usual therapy    Negative: Continuous (nonstop) coughing    Protocols used: BREATHING DIFFICULTY-A-OH

## 2021-11-18 NOTE — TELEPHONE ENCOUNTER
Received triage page regarding Mr. Banerjee. Chest tightness and shortness of breath. Attempted to call x 2 with no answer. Left voice message that he should immediately present to the nearest ER or call 911.

## 2021-11-18 NOTE — ED TRIAGE NOTES
"Pt arrived BIBA for Shortness of breath. Sharp abdominal pain midsternal prior to shortness of breath getting worse. Pt described as \"stabbing him\". Diminished breath sounds on right side. Pressures started in 100s systolic and dropped to 90 systolic. Hx: EF of 10%, on Lifevest. Candidate for heart transplant.   "

## 2021-11-18 NOTE — ED PROVIDER NOTES
[place] EMERGENCY DEPARTMENT ([facility])  11/18/21 ED 3 4:35 PM    History     Chief Complaint   Patient presents with     Shortness of Breath     The history is provided by the patient and medical records.     Kavon Banerjee is a 56 year old male with history of polysubstance abuse, cardiomyopathy, CHF (EF<10% per TTE on 11/2/2021), newly diagnosed type II diabetes mellitus who presents with shortness of breath for the past 2-3 days.  [facility]  He had recent hospitalization here from 11/1-11/12/2021 for heart failure exacerbation.  He had hospitalization at outside hospital for with complaints of shortness of breath, generalized weakness and bilateral lower extremity swelling.  He was felt to be in cardiogenic shock and was started on inotropic agents, but left AGAINST MEDICAL ADVICE soon after this he developed shortness of breath, dizziness and cyanosis.  911 was called at that time and he had blood pressure reading of 50/20s though satting 100% on room air.  He was admitted to cardiology and was started on milrinone infusion, diuretics, as well as a LifeVest.  He was discharged home with instructions to follow-up with cardiology as an outpatient.  Over the past few days he has noticed labored breathing out of nowhere.  He has shortness of breath even at rest.  He feels panicked, can't catch his breath, has to sit upright. He is on diuretics.  He spoke with cardiology and was told to increase his torsemide this morning to help manage his symptoms. He was told that if symptoms didn't improve to present to the Emergency Department.  Medics note that he was satting well but had cyanosis to his lips.  He continues to feel short of breath here and has chest pressure sensation like he has a weight in middle of chest. Has nonproductive dry cough. He denies any fever or chills.  Has a PICC line in place. No weight gain. He denies any leg swelling. 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 He is not vaccinated for COVID-19 and is reluctant to have this, states that we do not know the long-term effects of this vaccine.  He states that the cardiology team will be made him have a COVID-19 vaccine in order for him to be eligible for a heart transplant.  He states that he has tested negative COVID-19 twice in past 2 weeks.      Wt Readings from Last 10 Encounters:   11/18/21 99.3 kg (219 lb)   11/17/21 92 kg (202 lb 12.8 oz)   11/16/21 91.4 kg (201 lb 9.6 oz)   11/12/21 88.5 kg (195 lb 3.2 oz)     ECHOCARDIOGRAM 11/1/21   Interpretation Summary  Severe left ventricular dilation is present. Left ventricular function is  decreased. The ejection fraction is 5-10% (severely reduced). Severe diffuse  hypokinesis is present.  Mild right ventricular dilation is present. Global right ventricular function  is mildly reduced.  Mild mitral and tricuspid insufficiency present.  Pulmonary hypertension is present. Estimated pulmonary artery systolic  pressure is 41 mmHg plus right atrial pressure.  Dilation of the inferior vena cava is present with abnormal respiratory  variation in diameter.  No pericardial effusion is present.    RIGHT HEART CATH 11/1/2021    Right sided filling pressures are mildly elevated.    Moderately elevated pulmonary artery hypertension.    Left sided filling pressures are moderately elevated.    Hemodynamics consistent with cardiogenic shock     Elevated left and right sided filling pressures with moderate Group II Pulmonary Hypertension.       Past Medical History  No past medical history on file.  Past Surgical History:   Procedure Laterality Date     CV RIGHT HEART CATH MEASUREMENTS RECORDED N/A 11/04/2021    Procedure: Heart Cath Right Heart Cath;  Surgeon: Rome Franklin MD;  Location:  HEART CARDIAC CATH LAB     PICC SINGLE LUMEN PLACEMENT Right  "11/09/2021    44cm (1cm external), Basilic vein     acetaminophen (TYLENOL) 325 MG tablet  allopurinol (ZYLOPRIM) 100 MG tablet  alum & mag hydroxide-simethicone (MAALOX) 200-200-20 MG/5ML SUSP suspension  colchicine (COLCYRS) 0.6 MG tablet  escitalopram (LEXAPRO) 10 MG tablet  magnesium oxide (MAG-OX) 400 MG tablet  milrinone (PRIMACOR) infusion 200 mcg/mL PREMIX  polyethylene glycol (MIRALAX) 17 GM/Dose powder  potassium chloride ER (KLOR-CON M) 20 MEQ CR tablet  predniSONE (DELTASONE) 20 MG tablet  ramelteon (ROZEREM) 8 MG tablet  senna-docusate (SENOKOT-S/PERICOLACE) 8.6-50 MG tablet  torsemide (DEMADEX) 10 MG tablet      Allergies   Allergen Reactions     Pneumococcal Polysaccharides Other (See Comments)     Family History  No family history on file.  Social History   Social History     Tobacco Use     Smoking status: Never Smoker     Smokeless tobacco: Never Used   Substance Use Topics     Alcohol use: Not Currently     Comment: used to be a heavy drinker up until 50     Drug use: Not on file      Past medical history, past surgical history, medications, allergies, family history, and social history were reviewed with the patient. No additional pertinent items.       Review of Systems   Constitutional: Negative for chills and fever.   Respiratory: Positive for cough (Dry), chest tightness and shortness of breath.    Cardiovascular: Negative for chest pain and leg swelling.     A complete review of systems was performed with pertinent positives and negatives noted in the HPI, and all other systems negative.    Physical Exam   BP: 104/77  Pulse: 107  Temp: 97.9  F (36.6  C)  Resp: 22  Height: 185.4 cm (6' 1\")  Weight: 99.3 kg (219 lb)  SpO2: 100 %  Physical Exam  Constitutional:       General: He is not in acute distress.     Appearance: He is well-developed.   HENT:      Head: Normocephalic and atraumatic.   Eyes:      Extraocular Movements: Extraocular movements intact.      Pupils: Pupils are equal, round, " and reactive to light.   Cardiovascular:      Rate and Rhythm: Normal rate and regular rhythm.   Pulmonary:      Effort: Pulmonary effort is normal. No respiratory distress.      Breath sounds: No wheezing, rhonchi or rales.   Abdominal:      Palpations: Abdomen is soft.      Tenderness: There is no abdominal tenderness. There is no guarding or rebound.   Musculoskeletal:         General: Normal range of motion.      Cervical back: Normal range of motion.      Right lower leg: No edema.      Left lower leg: No edema.   Neurological:      General: No focal deficit present.      Mental Status: He is alert and oriented to person, place, and time.       ED Course      Procedures            EKG Interpretation:      Interpreted by Ronald Luna MD  Time reviewed: 1648  Symptoms at time of EKG: SOB   Rhythm: sinus tachycardia  Rate: 109  Axis: Left Axis Deviation  Ectopy: none  Conduction: nonspecific interventricular conduction block  ST Segments/ T Waves: Non-specific ST-T wave changes  Q Waves: none  Comparison to prior: Unchanged    Clinical Impression: no acute changes     Results for orders placed or performed during the hospital encounter of 11/18/21   XR Chest Port 1 View     Status: None    Narrative    Exam: XR CHEST PORT 1 VIEW, 11/18/2021 4:57 PM    Indication: sob, CHF    Comparison: 11/9/2021    Findings:   Right PICC distal tip projects over the superior vena cava.  Cardiomegaly similar to prior. Slightly increased perihilar  interstitial opacities. No pneumothorax. No pleural effusion. No acute  osseous abnormalities.      Impression    Impression: Cardiomegaly with streaky perihilar opacities which may  represent edema or atelectasis.    I have personally reviewed the examination and initial interpretation  and I agree with the findings.    ZAIN WILLINGHAM MD         SYSTEM ID:  [id number]   Symptomatic Influenza A/B & SARS-CoV2 (COVID-19) Virus PCR Multiplex Nasopharyngeal     Status: Normal    Specimen:  Nasopharyngeal; Swab   Result Value Ref Range    Influenza A target Negative Negative    Influenza B target Negative Negative    RSV target Negative Negative    SARS CoV2 PCR Negative Negative, Testing sent to reference lab. Results will be returned via unsolicited result    Narrative    Testing was performed using the Xpert Xpress CoV2/Flu/RSV Assay on the [employer] GeneXpert Instrument. This test should be ordered for the detection of SARS-CoV-2 and influenza viruses in individuals who meet clinical and/or epidemiological criteria. Test performance is unknown in asymptomatic patients. This test is for in vitro diagnostic use under the FDA EUA for laboratories certified under CLIA to perform high or moderate complexity testing. This test has not been FDA cleared or approved. A negative result does not rule out the presence of PCR inhibitors in the specimen or target RNA in concentration below the limit of detection for the assay. If only one viral target is positive but coinfection with multiple targets is suspected, the sample should be re-tested with another FDA cleared, approved, or authorized test, if coinfection would change clinical management. This test was validated by the [facility]. These laboratories are certified under the Clinical  Laboratory Improvement Amendments of 1988 (CLIA-88) as qualified to perform high complexity laboratory testing.   Extra Blue Top Tube     Status: None   Result Value Ref Range    Hold Specimen JIC    Extra Red Top Tube     Status: None   Result Value Ref Range    Hold Specimen JIC    Extra Green Top (Lithium Heparin) Tube     Status: None   Result Value Ref Range    Hold Specimen JIC    Extra Purple Top Tube     Status: None   Result Value Ref Range    Hold Specimen JIC    Basic metabolic panel     Status: Abnormal   Result Value Ref Range    Sodium 134 133 - 144 mmol/L    Potassium 4.7 3.4 - 5.3 mmol/L    Chloride 102 94 - 109 mmol/L    Carbon Dioxide  (CO2) 23 20 - 32 mmol/L    Anion Gap 9 3 - 14 mmol/L    Urea Nitrogen 23 7 - 30 mg/dL    Creatinine 1.37 (H) 0.66 - 1.25 mg/dL    Calcium 9.1 8.5 - 10.1 mg/dL    Glucose 91 70 - 99 mg/dL    GFR Estimate 58 (L) >60 mL/min/1.73m2   Nt probnp inpatient (BNP)     Status: Abnormal   Result Value Ref Range    N terminal Pro BNP Inpatient 7,641 (H) 0 - 900 pg/mL   Troponin I     Status: Normal   Result Value Ref Range    Troponin I 0.042 0.000 - 0.045 ug/L   CBC with platelets and differential     Status: Abnormal   Result Value Ref Range    WBC Count 8.6 4.0 - 11.0 10e3/uL    RBC Count 4.80 4.40 - 5.90 10e6/uL    Hemoglobin 11.3 (L) 13.3 - 17.7 g/dL    Hematocrit 36.7 (L) 40.0 - 53.0 %    MCV 77 (L) 78 - 100 fL    MCH 23.5 (L) 26.5 - 33.0 pg    MCHC 30.8 (L) 31.5 - 36.5 g/dL    RDW 24.1 (H) 10.0 - 15.0 %    Platelet Count 308 150 - 450 10e3/uL    % Neutrophils 59 %    % Lymphocytes 27 %    % Monocytes 8 %    % Eosinophils 5 %    % Basophils 1 %    % Immature Granulocytes 0 %    NRBCs per 100 WBC 0 <1 /100    Absolute Neutrophils 5.0 1.6 - 8.3 10e3/uL    Absolute Lymphocytes 2.3 0.8 - 5.3 10e3/uL    Absolute Monocytes 0.7 0.0 - 1.3 10e3/uL    Absolute Eosinophils 0.4 0.0 - 0.7 10e3/uL    Absolute Basophils 0.1 0.0 - 0.2 10e3/uL    Absolute Immature Granulocytes 0.0 <=0.0 10e3/uL    Absolute NRBCs 0.0 10e3/uL   Lipase     Status: Normal   Result Value Ref Range    Lipase 106 73 - 393 U/L   Hepatic panel     Status: Abnormal   Result Value Ref Range    Bilirubin Total 1.2 0.2 - 1.3 mg/dL    Bilirubin Direct 0.3 (H) 0.0 - 0.2 mg/dL    Protein Total 7.0 6.8 - 8.8 g/dL    Albumin 3.2 (L) 3.4 - 5.0 g/dL    Alkaline Phosphatase 122 40 - 150 U/L    AST 37 0 - 45 U/L    ALT 17 0 - 70 U/L   EKG 12-lead, tracing only     Status: None   Result Value Ref Range    Systolic Blood Pressure  mmHg    Diastolic Blood Pressure  mmHg    Ventricular Rate 109 BPM    Atrial Rate 109 BPM    IN Interval 154 ms    QRS Duration 140 ms     ms     QTc 530 ms    P Axis 54 degrees    R AXIS -36 degrees    T Axis 104 degrees    Interpretation ECG       Sinus tachycardia  Possible Left atrial enlargement  Left axis deviation  Non-specific intra-ventricular conduction block  Cannot rule out Septal infarct , age undetermined  T wave abnormality, consider lateral ischemia  Abnormal ECG  Unconfirmed report - interpretation of this ECG is computer generated - see medical record for final interpretation  Confirmed by - EMERGENCY ROOM, PHYSICIAN ([id number]),  JAYSON SARMIENTO ([id number]) on 11/18/2021 9:16:34 PM     [place] Draw     Status: None    Narrative    The following orders were created for panel order [place] Draw.  Procedure                               Abnormality         Status                     ---------                               -----------         ------                     Extra Blue Top Tube[[id number]]                              Final result               Extra Red Top Tube[[id number]]                               Final result               Extra Green Top (Lithium...[[id number]]                      Final result               Extra Purple Top Tube[[id number]]                            Final result                 Please view results for these tests on the individual orders.   CBC with platelets differential     Status: Abnormal    Narrative    The following orders were created for panel order CBC with platelets differential.  Procedure                               Abnormality         Status                     ---------                               -----------         ------                     CBC with platelets and d...[[id number]]  Abnormal            Final result                 Please view results for these tests on the individual orders.     Medications   milrinone (PRIMACOR) infusion 200 mcg/mL PREMIX (0 mcg/kg/min × 89.1 kg (Dosing Weight) Intravenous Stopped 11/18/21 2107)   furosemide (LASIX) injection 160 mg (160 mg Intravenous Given  11/18/21 2023)        Assessments & Plan (with Medical Decision Making)   Patient presents for shortness of breath.  He has very low ejection fraction CHF on milrinone continuous infusion and diuretics.  He was recently discharged in the hospital for CHF.  He also had a outpatient appointment yesterday.  They recently increase his diuretics due to his symptoms.  He comes back in for shortness of breath and chest pressure.  He states that he cannot catch his breath however his vitals are stable he is 100% on room air he is hemodynamically stable lungs are clear and in no acute respiratory distress.  I discussed all this with the patient.  His EKG was sinus tachycardia with nonspecific ST changes that are similar to his previous there is no signs for acute ischemia.  Laboratory work was obtained and is stable from his previous labs there are no new major significant abnormalities.  His BMP has decreased from several days earlier from 11,00 to 7000.  He does not appear volume overloaded on exam.  Troponin is negative.  Chest x-ray shows is cardiomegaly and appears stable for his congestion from recent imaging.  This last admission in the last week he had multiple tests including echo and cath done prior.  I discussed the results with the patient.  He still feels short of breath but I discussed that he is not hypoxic and is not in any respiratory distress and it is reassuring on his labs imaging and exam that this appears stable.  Discussed with his heart failure cardiology team who did not feel that he needed to be readmitted and is reassured with his test vitals and exam.  They did recommend a large dose of IV Lasix to continue to help with diuresis and his symptoms.  This will be given.  Milrinone was ordered here as his home infusion is running out and we do not have any cartridges to send the patient home.  Patient's family will come pick the patient up and bring a replacement cartridge.  Patient is stable for  discharge and outpatient follow-up with cardiology.  He understands signs and symptoms to return.  Discharged in stable condition.    I have reviewed the nursing notes. I have reviewed the findings, diagnosis, plan and need for follow up with the patient.    New Prescriptions    No medications on file       Final diagnoses:   Shortness of breath     I, Maral Samuels, am serving as a trained medical scribe to document services personally performed by Ronald Luna MD based on the provider's statements to me on November 18, 2021.  This document has been checked and approved by the attending provider.    IRonald MD, was physically present and have reviewed and verified the accuracy of this note documented by Maral Samuels, medical scribe.      Ronald Luna MD   [facility] EMERGENCY DEPARTMENT  11/18/2021     Ronald Luna MD  11/18/21 2126

## 2021-11-18 NOTE — PROGRESS NOTES
This is a recent snapshot of the patient's [facility] medical record.  For current drug dose and complete information and questions, call [phone] or In Basket pool, [facility]v home infusion ([id number])  CSN Number:  [id number]

## 2021-11-19 NOTE — ED NOTES
Pt declines discharge vitals. Pt discharge instructions reviewed and all questions answered. Pt ambulatory to lobby with family at 21:45.

## 2021-11-19 NOTE — DISCHARGE INSTRUCTIONS
Please make an appointment to follow up with your cardiologist, Your Primary Care Provider, and [facility] (phone: [phone]) as soon as possible as needed.

## 2021-11-24 NOTE — TELEPHONE ENCOUNTER
Reason for Call:  Medication or medication refill:    Do you use a [facility]?  Name of the pharmacy and phone number for the current request:  [facility] [phone]    Name of the medication requested: Torsemide 20 mg; Ramelteon 8 mg; Escitalopram Oxalate 10 mg; Colchisine 0.6 mg; Allopurinol 300 mg;     Other request: None    Can we leave a detailed message on this number? YES    Phone number patient can be reached at: Cell number on file:    Telephone Information:   Mobile [phone]     Best Time: Any    Call taken on 11/24/2021 at 1:49 PM by Elke Mckenzie

## 2021-11-26 NOTE — PROGRESS NOTES
Advanced Heart Failure Clinic -- CORE follow up  November 17, 2021    HPI:   Mr. Kavon Banerjee is a 56yr old male with a longstanding history of chronic systolic heart failure secondary to NICM (LVEF < 10%, LVEDD 8.0cm), polysubstance abuse (alcohol, meth), newly diagnosed DMII, chronic gout with painful tophi, and anxiety/depression who [place] [facility] [place] for follow-up of recent hospitalization.    Has a longstanding history of NICM, thought to be secondary to polysubstance abuse (meth, EtOH).  He has previously been living in [place], where he reported feeling well until about 2 months when he developed progressive exertional intolerance, PND/orthopnea, abdominal distention, edema.  He was recently hospitalized at [facility], where he was found to be in cardiogenic shock requiring inotropic support.  He left AMA on 11/1, but represented to [facility] ED later that day due to progressively worsening symptoms.  His decline was attributed to his inotrope wean, not optimized medical therapy, and nonadherence to diet and fluid restrictions.     11/26/21- had some gas pain. Had a an ER visit 11/18 they didn't find any cardiac issue.  He feels better today.  His weight at home 192-195 lbs.  He has some SOB at rest. He has a hard time laying flat. He is on milrinone. He has some DUNN. Has some tightness across the chest, attributes to Lifevest being tight, called company, got a bigger vest, helped a lot. No swelling in the legs/feet/ankles    PAST MEDICAL HISTORY:  No past medical history on file.    FAMILY HISTORY:  Family History   Problem Relation Age of Onset     Hypertension Mother      Hypertension Father      Prostate Cancer Father      Cancer Father      Substance Abuse Father      Hypertension Brother        SOCIAL HISTORY:  Social History     Socioeconomic History     Marital status:      Spouse name: Not on file     Number of children: Not on file     Years of education: Not on file     Highest  education level: Not on file   Occupational History     Not on file   Tobacco Use     Smoking status: Never Smoker     Smokeless tobacco: Never Used   Substance and Sexual Activity     Alcohol use: Not Currently     Comment: used to be a heavy drinker up until 50     Drug use: Not on file     Sexual activity: Not on file   Other Topics Concern     Not on file   Social History Narrative     Not on file     Social Determinants of Health     Financial Resource Strain: Not on file   Food Insecurity: Not on file   Transportation Needs: Not on file   Physical Activity: Not on file   Stress: Not on file   Social Connections: Not on file   Intimate Partner Violence: Not on file   Housing Stability: Not on file       CURRENT MEDICATIONS:  Current Outpatient Medications   Medication Sig Dispense Refill     acetaminophen (TYLENOL) 325 MG tablet Take 2 tablets (650 mg) by mouth every 4 hours as needed for mild pain       allopurinol (ZYLOPRIM) 100 MG tablet Take 1 tablet (100 mg) by mouth daily 90 tablet 1     alum & mag hydroxide-simethicone (MAALOX) 200-200-20 MG/5ML SUSP suspension Take 30 mLs by mouth every 4 hours as needed for indigestion 355 mL 0     colchicine (COLCYRS) 0.6 MG tablet Take 1 tablet (0.6 mg) by mouth daily 90 tablet 1     escitalopram (LEXAPRO) 10 MG tablet Take 1 tablet (10 mg) by mouth At Bedtime 30 tablet 1     magnesium oxide (MAG-OX) 400 MG tablet Take 1 tablet (400 mg) by mouth daily 30 tablet 1     milrinone (PRIMACOR) infusion 200 mcg/mL PREMIX Inject 22.275 mcg/min into the vein continuous       polyethylene glycol (MIRALAX) 17 GM/Dose powder Take 17 g by mouth 2 times daily as needed       potassium chloride ER (KLOR-CON M) 20 MEQ CR tablet Take 2 tablets (40 mEq) by mouth 2 times daily 120 tablet 1     predniSONE (DELTASONE) 20 MG tablet Take 20 mg by mouth as needed       ramelteon (ROZEREM) 8 MG tablet Take 1 tablet (8 mg) by mouth every evening 30 tablet 0     senna-docusate  "(SENOKOT-S/PERICOLACE) 8.6-50 MG tablet Take 1 tablet by mouth 2 times daily as needed for constipation       torsemide (DEMADEX) 10 MG tablet Take 8 tablets (80 mg) by mouth 2 times daily 600 tablet 1       ROS:   Constitutional: No fever, chills, or sweats. No weight gain/loss.   ENT: No visual disturbance, ear ache, epistaxis, sore throat.   Allergies/Immunologic: Negative.   Respiratory: No cough, hemoptysis.   Cardiovascular: As per HPI.   GI: No nausea, vomiting, hematemesis, melena, or hematochezia.   : No urinary frequency, dysuria, or hematuria.   Integument: Negative.   Psychiatric: Negative.   Neuro: Negative.   Endocrinology: Negative.   Musculoskeletal: Negative.    EXAM:  BP 91/67 (BP Location: Left arm, Patient Position: Chair, Cuff Size: Adult Regular)   Pulse 93   Ht 1.854 m (6' 1\")   Wt 89.9 kg (198 lb 3.2 oz)   SpO2 99%   BMI 26.15 kg/m    General: appears comfortable, alert and articulate  Head: normocephalic, atraumatic  Eyes: anicteric sclera, EOMI  Neck: no adenopathy  Orophyarynx: moist mucosa, no lesions, dentition intact  Heart: regular, S1/S2, no murmur, gallop, rub, estimated JVP not elevated  Lungs: clear, no rales or wheezing  Abdomen: soft, non-tender, bowel sounds present, no hepatosplenomegaly  Extremities: no clubbing, cyanosis or edema  Neurological: normal speech and affect, no gross motor deficits    Labs:  CBC RESULTS:  Lab Results   Component Value Date    WBC 8.6 11/18/2021    RBC 4.80 11/18/2021    HGB 11.3 (L) 11/18/2021    HCT 36.7 (L) 11/18/2021    MCV 77 (L) 11/18/2021    MCH 23.5 (L) 11/18/2021    MCHC 30.8 (L) 11/18/2021    RDW 24.1 (H) 11/18/2021     11/18/2021       CMP RESULTS:  Lab Results   Component Value Date     11/26/2021    POTASSIUM 3.3 (L) 11/26/2021    CHLORIDE 100 11/26/2021    CO2 31 11/26/2021    ANIONGAP 7 11/26/2021     (H) 11/26/2021    BUN 15 11/26/2021    CR 1.31 (H) 11/26/2021    GFRESTIMATED 61 11/26/2021    JESSICA 9.5 " "11/26/2021    BILITOTAL 1.2 11/18/2021    ALBUMIN 3.2 (L) 11/18/2021    ALKPHOS 122 11/18/2021    ALT 17 11/18/2021    AST 37 11/18/2021        INR RESULTS:  Lab Results   Component Value Date    INR 1.22 (H) 11/08/2021       Lab Results   Component Value Date    MAG 2.0 11/12/2021     Lab Results   Component Value Date    NTBNPI 7,641 (H) 11/18/2021     Lab Results   Component Value Date    NTBNP 11,110 (H) 11/16/2021       Assessment and Plan:     Chronic systolic heart failure secondary to NICM (LVEF <10% per TTE 11/2/21)  Stage D, NYHA Class III  - ACEi/ARB/ARNi:  entresto was held during hospitalization due to hypotension- soft BP  - Afterload reduction:  n/a  - BB:  Deferred due to cardiogenic shock  - Aldosterone antagonist: Deferred due to soft Bps in the hospital.  Has been on spironolactone with good tolerance in the past.  - SGLT2i:  Deferred for now, will consider at future appts  - Inotropy: milrinone @ 0.25mcg/kg/min.   - SCD ppx:  Lifevest  - Fluid status: euvolemic, continue torsemide 60/40mg      Polysubstance abuse  History of EtOH abuse as well as prior polysubstance abuse (reports prior use of cocaine, methamphetamine, mushrooms, LSD).  Has had recent meth use within the last 2 months.  He reports that he has his EtOH under control, but does still \"sip\" on liquor at times when out with friends.  Drug screening and PETH were negative on 11/2.  He has not completed formal chemical dependency treatment recently.  He has been informed that he will need to complete a chemical dependency program in order to be considered for advanced heart failure therapies in the future.  Reinforced complete abstinence from substances.  Neuropych and social work teams following, to help determine length of sobriety needed.  Will random drug screening per protocol.     DMII  Elevated HgbA1c, has not required insulin.  Managed by PCP.     Gout  Reports extremely longstanding history of gout, and has been taking taking " allopurinol and colchicine.  Has painful tophi on his wrist, which he would like removed.  Needs to follow-up with rheumatology and general surgery for further consideration.    Anxiety/depression  Psychiatry consulted while inpatient on 11/4, and he was started on Lexapro 10mg daily.  Note that he did not tolerate hydroxyzine and melatonin.  Continue ramelteon, further management per PCP/psychiatry.       Will not re-start any medications today due to lower BP's and how he is feeling. Would like to reassess after the weekend and if he is better we can possibly trial a start of low dose Entresto. Hypokalemia noted and will have him take a couple extra potassium doses.     - CORE schedule with Hillary S CORE - 12/20  - re-start Entresto next week if Systolic BP's are above 100. 12/13 mg BID.   - Potassium extra 40 mEq for 2 days only    Kimberly SOTO NP-C  Advanced Heart Failure Nurse Practitioner  [facility]  Patient Care Team:  No Ref-Primary, Physician as PCP - Zoe Mendez, RN as Specialty Care Coordinator (Cardiology)  Chastity Goldman NP as Nurse Practitioner (Interventional Cardiology)

## 2021-11-26 NOTE — TELEPHONE ENCOUNTER
Routing refill request to provider for review/approval because:  Labs not current  Anisa GARCIA RN

## 2021-11-26 NOTE — PROGRESS NOTES
Return Appointment: Patient given instructions regarding scheduling next clinic visit. Patient demonstrated understanding of this information and agreed to call with further questions or concerns. CORE in 1 week with labs prior.    Patient stated he understood all health information given and agreed to call with further questions or concerns.    Gianna Gamino RN

## 2021-11-26 NOTE — PATIENT INSTRUCTIONS
Take your medicines every day, as directed    Changes made today:  o RN will call you on Monday for BP check.  o If SBP is over 100, we will start 0.5 tab of Entresto (24-26).   o Take an extra 40 mEq potassium for 2 days.   Monitor Your Weight and Symptoms    Contact us if you:      Gain 2 pounds in one day or 5 pounds in one week    Feel more short of breath    Notice more leg swelling    Feel lightheadeded   Change your lifestyle    Limit Salt or Sodium:    2000 mg  Limit Fluids:    2000 mL or approximately 64 ounces  Eat a Heart Healthy Diet    Low in saturated fats  Stay Active:    Aim to move at least 150 minutes every  week         To Contact us    During Business Hours:  [phone], option # 1 (University)  Then option # 4 (medical questions)     After hours, weekends or holidays:   [phone], Option #4  Ask to speak to the On-Call Cardiologist. Inform them you are a CORE/heart failure patient at the [place].     Use [employer] allows you to communicate directly with your heart team through secure messaging.    [employer] can be accessed any time on your phone, computer, or tablet.    If you need assistance, we'd be happy to help!         Keep your Heart Appointments:    Follow up with CORE Clinic in 1 week with labs prior.         Results for LADI MENDOZA (MRN [id number]) as of 11/26/2021 11:42   Ref. Range 11/26/2021 10:55   Sodium Latest Ref Range: 133 - 144 mmol/L 138   Potassium Latest Ref Range: 3.4 - 5.3 mmol/L 3.3 (L)   Chloride Latest Ref Range: 94 - 109 mmol/L 100   Carbon Dioxide Latest Ref Range: 20 - 32 mmol/L 31   Urea Nitrogen Latest Ref Range: 7 - 30 mg/dL 15   Creatinine Latest Ref Range: 0.66 - 1.25 mg/dL 1.31 (H)   GFR Estimate Latest Ref Range: >60 mL/min/1.73m2 61   Calcium Latest Ref Range: 8.5 - 10.1 mg/dL 9.5   Anion Gap Latest Ref Range: 3 - 14 mmol/L 7   Uric Acid Latest Ref Range: 3.5 - 7.2 mg/dL 5.8   Glucose Latest Ref Range: 70 - 99 mg/dL 101 (H)        Results for LADI MENDOZA (MRN [id number]) as of 11/26/2021 11:42   Ref. Range 11/18/2021 16:43   Sodium Latest Ref Range: 133 - 144 mmol/L 134   Potassium Latest Ref Range: 3.4 - 5.3 mmol/L 4.7   Chloride Latest Ref Range: 94 - 109 mmol/L 102   Carbon Dioxide Latest Ref Range: 20 - 32 mmol/L 23   Urea Nitrogen Latest Ref Range: 7 - 30 mg/dL 23   Creatinine Latest Ref Range: 0.66 - 1.25 mg/dL 1.37 (H)   GFR Estimate Latest Ref Range: >60 mL/min/1.73m2 58 (L)   Calcium Latest Ref Range: 8.5 - 10.1 mg/dL 9.1   Anion Gap Latest Ref Range: 3 - 14 mmol/L 9   Albumin Latest Ref Range: 3.4 - 5.0 g/dL 3.2 (L)   Protein Total Latest Ref Range: 6.8 - 8.8 g/dL 7.0   Bilirubin Total Latest Ref Range: 0.2 - 1.3 mg/dL 1.2   Alkaline Phosphatase Latest Ref Range: 40 - 150 U/L 122   ALT Latest Ref Range: 0 - 70 U/L 17   AST Latest Ref Range: 0 - 45 U/L 37   Bilirubin Direct Latest Ref Range: 0.0 - 0.2 mg/dL 0.3 (H)   Lipase Latest Ref Range: 73 - 393 U/L 106   N-Terminal Pro BNP Inpatient Latest Ref Range: 0 - 900 pg/mL 7,641 (H)   Troponin I Latest Ref Range: 0.000 - 0.045 ug/L 0.042   Glucose Latest Ref Range: 70 - 99 mg/dL 91   WBC Latest Ref Range: 4.0 - 11.0 10e3/uL 8.6   Hemoglobin Latest Ref Range: 13.3 - 17.7 g/dL 11.3 (L)   Hematocrit Latest Ref Range: 40.0 - 53.0 % 36.7 (L)   Platelet Count Latest Ref Range: 150 - 450 10e3/uL 308   RBC Count Latest Ref Range: 4.40 - 5.90 10e6/uL 4.80   MCV Latest Ref Range: 78 - 100 fL 77 (L)   MCH Latest Ref Range: 26.5 - 33.0 pg 23.5 (L)   MCHC Latest Ref Range: 31.5 - 36.5 g/dL 30.8 (L)   RDW Latest Ref Range: 10.0 - 15.0 % 24.1 (H)   % Neutrophils Latest Units: % 59   % Lymphocytes Latest Units: % 27   % Monocytes Latest Units: % 8   % Eosinophils Latest Units: % 5   % Basophils Latest Units: % 1   Absolute Basophils Latest Ref Range: 0.0 - 0.2 10e3/uL 0.1   Absolute Eosinophils Latest Ref Range: 0.0 - 0.7 10e3/uL 0.4   Absolute Immature Granulocytes Latest Ref Range: <=0.0  10e3/uL 0.0   Absolute Lymphocytes Latest Ref Range: 0.8 - 5.3 10e3/uL 2.3   Absolute Monocytes Latest Ref Range: 0.0 - 1.3 10e3/uL 0.7   % Immature Granulocytes Latest Units: % 0   Absolute Neutrophils Latest Ref Range: 1.6 - 8.3 10e3/uL 5.0   Absolute NRBCs Latest Units: 10e3/uL 0.0   NRBCs per 100 WBC Latest Ref Range: <1 /100 0

## 2021-11-26 NOTE — NURSING NOTE
Chief Complaint   Patient presents with     Follow Up     Core return      Vitals were taken and medications were reconciled.   MARY ELLEN Dang  11:24 AM

## 2021-11-26 NOTE — PROGRESS NOTES
Patient of Dr. Franklin seen in clinic for psychosocial assessment.   90 minutes spent with patient and his mother, Teresa. 100% of visit consisted of counseling related to issues surrounding LVAD and Heart Transplant.    Psychosocial Assessment   Name: Kavon Banerjee     MRN:  [id number]        Patient Name / Age / Race: Kavon Banerjee 56 year old    Source of Information: Patient and mother, Teresa   : Hort[facility]   Interview Date: 2021   Reason for Referral: Heart Transplant and Mechanical Circulatory Support     Current Living Situation   Location ([facility]/State): [address] [place] [place] [address] [address]  [facility] [id number]   With Whom: Living arrangements - the patient lives with their family.   Type of Home: [facility]- 2 level with 9-10 stairs w/ landing to bedroom   Working Phone? Yes    [employer] Outlet? Yes    Distance from Hospital: 20 minutes   Need to Relocate Post Surgery? No   Discussed? Yes      Vocational/Employment/Financial   Employment: Disabled due to heart condition    Occupation: [employer]-worked at a [employer] for 25yrs    Education: High School + 2 1/2 yrs of college    ? No   Income: SSD   Insurance: Medicare and Private Insurance   Name of Private Insurance: BCBS   Medication Coverage: Yes    In current situation, pt. can afford medication and supply costs:  Yes    Other Financial Concerns/Issues: Pt denies financial concerns     Family/Social Support   Marital Status:  ()   Partner Name: N/A    Length of Time : N/A   Partner s Employment: N/A    Relationship: N/A    Children: 15yr old [id number] in [place]   Parents: Mother is alive and father is     Siblings: 1 brother    Other Family or Friends Close by: Extended family in area   Support System: available, helpful   Primary Support Person: Pt's motherTeresa    Issues: Discussed with pt's mother caregiver role and expectations. She is 79yo but looks younger  then stated age and is still very active and working 3 days a week. She has no concerns in being able to fulfill caregiver duties.      Activities/Functional Ability   Current Level: ambulatory, cane/walker, visually impaired and independent with ADL's  Pt reports he is unable to see well due to cataracts. Pt reports he uses a cane because he can't see.    Daily Activities: Pt tries to walk, does some household chores    Transportation: Other: Mother provides transportation      Medical Status   Patient s understanding of need for surgical intervention: Good understanding    Pt was initially diagnosed with heart failure in February 2019, in [place], during an ED encounter.               Advanced Directive? No    Details: provided education   Adherence History: There is extensive documentation in care everywhere regarding pt's noncompliance with medical recommendations (missed appts with EP for pacemaker noted 1/7/2020), not taking medications and leaving AMA (10/21/2021,9/29/2021 1/9/2021, 11/16/2020)    Confirmed with pt that medication noncompliance was not related to insurance or financial concerns.          Ability to Adhere to Complex Medical Regime: Pt has not demonstrated medical adherence and compliance since dx of heart failure 2/2019.     Behavioral   Chemical Dependency Issues: Yes : Hx of meth and alcohol use more recently. Remote hx of LSD, THC and cocaine. Heavy alcohol use most of his life but quit on his own in 10/2018. Pt has had 3 DUIs with last DUI in 2013. Pt reports that a DUI he received in July of this year (2021) is being thrown out because it was related to prescription drug use.      Reports he has dabbled in methamphetamines over the years with last use at the beginning of this year. This is corroborated by documentation in care everywhere when admitted to using on 1/9/2021. Pt reports that he has not had negative consequences due to drug use. Pt reports that working in the [employer] for 25yrs  "he was subjected to random drug testing and never tested positive.     Pt reports he has never engaged in CD treatment or a CD evaluation.     Smoker? No   Hx of cigarettes use quit 4/2019. Denies hx of vaping or smokeless tobacco use.           Psychiatric impairment: Yes Depression and anxiety.   Pt denied inpatient psychiatric hospitalization, but writer did find documentation of pt being hospitalized, in June 2018 for SI with plan of \"suicide by \". Writer found this information after writer had met w/ pt; will need to get more information about mental health history.            Coping Style/Strategies: Pt could not identify any protective coping factors that help him during stressful situations.    Recent Legal History: Yes  3 DUIs  Not counted is recent DUI that was dropped    Teaching Completed During Assessment Related To Transplant and Mechanical Circulatory Support: 1. Housing and relocation needs post surgery.  2. Caregiver needs post surgery.  3. Financial issues related to surgery.  4. Risks of alcohol use post surgery.  5. Common psychosocial stressors pre/post surgery.  6. Support group availability.   Psychosocial Risks Reviewed Related To Transplant and Mechanical Circulatory Support: 1. Increased stress related to your emotional, family, social, employment, or financial situation.  2. Affect on work and/or disability benefits.  3. Affect on future health and life insurance.  4. Outcome expectations may not be met.  5. Mental Health risks: anxiety, depression, PTSD, guilt, grief and chronic fatigue.     Observed Behavior   Well informed? Yes  Angry? No   Process info well? Yes  Hostile? No   Evasive? No Oriented X3? Yes    Cautious/Suspicious? No Motivated? Yes    Appropriate Behavior? Yes  Depressed? Yes    Appropriate Affect? Yes  Anxious? Yes    Interview Observations: Pt was pleasant and cooperative. Initially met w/ pt alone and then brought his mother in. Mother appears very supportive of " pt.      Selection Criteria Met   Plan for Support Yes    Chemical Dependence No   Smoking Yes    Mental Health Yes    Adequate Finances Yes      Risk Issues:    -patient has a poor hx of medical noncompliance with medication management, medical recommendations and leaving AMA.     -Pt has been inconsistent in reporting his recent drug use history.    -Pt would benefit from establishing mental health support.     Final Evaluation/Assessment:      met w/ pt and his mother, in clinic, to assess complex psychosocial situation in setting of pt likely needing advanced therapies in future. [place], [place], due to declining health and wanting to be closer to his mother and extended family for support. Pt moved from [place] to the [facility] in [place] and worked as a  in a refinery for 25yrs before he had to go on disability due to his heart condition. Pt was first diagnosed with heart failure in February 2019. Extensive documentation of medical noncompliance since beginning of diagnosis up until he left [place] in October (2021).     Pt has an identified caregiver support plan that would include his mother, whom he is living with. She appears very supportive of pt and motivated to provide whatever support he would need. Pt also has other extended family in area that would be available to help as well.     Pt's chemical dependency history is concerning. Pt appears to be sober from alcohol use since 2018, but does still report he has sips occasionally. Pt reports occasional methamphetamine use.Pt self reported methamphetamine use in January (2021). Pt's has been inconsistent in reporting when he last used methamphetamine; told our team, during November admissions that he used the month prior but told Dr. Goode, during same admissions, that he used several months prior to that admission. Recent PEth and drug screens have been negative. Pt has been evaluated by  Addiction Medicine with no recommendation for CD evaluation. Plan was for pt to f/u outpatient with Dr. Goode at [facility].     Pt was minimizing his mental health hx to writer and needs f/u. Pt does have hx of depression and anxiety. Pt reports a very stressful relationship with his ex-wife and not being able to see his 15yr old dtr, whom lives in [place]. Writer also wonders what role if any pt's depression and anxiety have played in his noncompliance.     Writer has no concerns in the area of smoking or insurance.    From a psychosocial perspective pt is a conditional candidate for LVAD or heart transplant. Pt has significant hx of medical noncompliance and leaving AMA; would need like to see better compliance and patient participation in his medical care. Need to continue to drug screen patient as pt has reported drug use in past few months. Pt appears motivated to f/u with Dr. Goode, with the Addiction Medicine Team. Pt should be connected to community mental health support for adjustment to illness counseling and family issues and to develop coping skills.    Patient understands risks and benefits of Heart Transplant and Mechanical Circulatory Support: Yes     Recommendation:    conditional    Signature: DAVID Hernandez     Title: Licensed Independent Clinical                Interview Date: November 26, 2021

## 2021-11-26 NOTE — LETTER
11/26/2021      RE: Kavon Banerjee  [address]  [facility] [address]       Dear Colleague,    Thank you for the opportunity to participate in the care of your patient, Kavon Banerjee, at the [facility] [place] [facility]. Please see a copy of my visit note below.    Return Appointment: Patient given instructions regarding scheduling next clinic visit. Patient demonstrated understanding of this information and agreed to call with further questions or concerns. CORE in 1 week with labs prior.    Patient stated he understood all health information given and agreed to call with further questions or concerns.    Gianna Gamino RN      Advanced Heart Failure Clinic -- CORE follow up  November 17, 2021    HPI:   Mr. Kavon Banerjee is a 56yr old male with a longstanding history of chronic systolic heart failure secondary to NICM (LVEF < 10%, LVEDD 8.0cm), polysubstance abuse (alcohol, meth), newly diagnosed DMII, chronic gout with painful tophi, and anxiety/depression who [place] [facility] [place] and for follow-up of recent hospitalization.    Has a longstanding history of NICM, thought to be secondary to polysubstance abuse (meth, EtOH).  He has previously been living in [place], where he reported feeling well until about 2 months when he developed progressive exertional intolerance, PND/orthopnea, abdominal distention, edema.  He was recently hospitalized at OSH, where he was found to be in cardiogenic shock requiring inotropic support.  He left AMA on 11/1, but represented to [facility] ED later that day due to progressively worsening symptoms.  His decline was attributed to his inotrope wean, not optimized medical therapy, and nonadherence to diet and fluid restrictions.     11/26/21- had some gas pain. Had a an ER visit 11/18 they didn't find any cardiac issue.  He feels better today.  His weight at home 192-195 lbs.  He has  some SOB at rest. He has a hard time laying flat. He is on milrinone. He has some DUNN. Has some tightness across the chest, attributes to Lifevest being tight, called company, got a bigger vest, helped a lot. No swelling in the legs/feet/ankles    PAST MEDICAL HISTORY:  No past medical history on file.    FAMILY HISTORY:  Family History   Problem Relation Age of Onset     Hypertension Mother      Hypertension Father      Prostate Cancer Father      Cancer Father      Substance Abuse Father      Hypertension Brother        SOCIAL HISTORY:  Social History     Socioeconomic History     Marital status:      Spouse name: Not on file     Number of children: Not on file     Years of education: Not on file     Highest education level: Not on file   Occupational History     Not on file   Tobacco Use     Smoking status: Never Smoker     Smokeless tobacco: Never Used   Substance and Sexual Activity     Alcohol use: Not Currently     Comment: used to be a heavy drinker up until 50     Drug use: Not on file     Sexual activity: Not on file   Other Topics Concern     Not on file   Social History Narrative     Not on file     Social Determinants of Health     Financial Resource Strain: Not on file   Food Insecurity: Not on file   Transportation Needs: Not on file   Physical Activity: Not on file   Stress: Not on file   Social Connections: Not on file   Intimate Partner Violence: Not on file   Housing Stability: Not on file       CURRENT MEDICATIONS:  Current Outpatient Medications   Medication Sig Dispense Refill     acetaminophen (TYLENOL) 325 MG tablet Take 2 tablets (650 mg) by mouth every 4 hours as needed for mild pain       allopurinol (ZYLOPRIM) 100 MG tablet Take 1 tablet (100 mg) by mouth daily 90 tablet 1     alum & mag hydroxide-simethicone (MAALOX) 200-200-20 MG/5ML SUSP suspension Take 30 mLs by mouth every 4 hours as needed for indigestion 355 mL 0     colchicine (COLCYRS) 0.6 MG tablet Take 1 tablet (0.6 mg)  "by mouth daily 90 tablet 1     escitalopram (LEXAPRO) 10 MG tablet Take 1 tablet (10 mg) by mouth At Bedtime 30 tablet 1     magnesium oxide (MAG-OX) 400 MG tablet Take 1 tablet (400 mg) by mouth daily 30 tablet 1     milrinone (PRIMACOR) infusion 200 mcg/mL PREMIX Inject 22.275 mcg/min into the vein continuous       polyethylene glycol (MIRALAX) 17 GM/Dose powder Take 17 g by mouth 2 times daily as needed       potassium chloride ER (KLOR-CON M) 20 MEQ CR tablet Take 2 tablets (40 mEq) by mouth 2 times daily 120 tablet 1     predniSONE (DELTASONE) 20 MG tablet Take 20 mg by mouth as needed       ramelteon (ROZEREM) 8 MG tablet Take 1 tablet (8 mg) by mouth every evening 30 tablet 0     senna-docusate (SENOKOT-S/PERICOLACE) 8.6-50 MG tablet Take 1 tablet by mouth 2 times daily as needed for constipation       torsemide (DEMADEX) 10 MG tablet Take 8 tablets (80 mg) by mouth 2 times daily 600 tablet 1       ROS:   Constitutional: No fever, chills, or sweats. No weight gain/loss.   ENT: No visual disturbance, ear ache, epistaxis, sore throat.   Allergies/Immunologic: Negative.   Respiratory: No cough, hemoptysis.   Cardiovascular: As per HPI.   GI: No nausea, vomiting, hematemesis, melena, or hematochezia.   : No urinary frequency, dysuria, or hematuria.   Integument: Negative.   Psychiatric: Negative.   Neuro: Negative.   Endocrinology: Negative.   Musculoskeletal: Negative.    EXAM:  BP 91/67 (BP Location: Left arm, Patient Position: Chair, Cuff Size: Adult Regular)   Pulse 93   Ht 1.854 m (6' 1\")   Wt 89.9 kg (198 lb 3.2 oz)   SpO2 99%   BMI 26.15 kg/m    General: appears comfortable, alert and articulate  Head: normocephalic, atraumatic  Eyes: anicteric sclera, EOMI  Neck: no adenopathy  Orophyarynx: moist mucosa, no lesions, dentition intact  Heart: regular, S1/S2, no murmur, gallop, rub, estimated JVP not elevated  Lungs: clear, no rales or wheezing  Abdomen: soft, non-tender, bowel sounds present, no " hepatosplenomegaly  Extremities: no clubbing, cyanosis or edema  Neurological: normal speech and affect, no gross motor deficits    Labs:  CBC RESULTS:  Lab Results   Component Value Date    WBC 8.6 11/18/2021    RBC 4.80 11/18/2021    HGB 11.3 (L) 11/18/2021    HCT 36.7 (L) 11/18/2021    MCV 77 (L) 11/18/2021    MCH 23.5 (L) 11/18/2021    MCHC 30.8 (L) 11/18/2021    RDW 24.1 (H) 11/18/2021     11/18/2021       CMP RESULTS:  Lab Results   Component Value Date     11/26/2021    POTASSIUM 3.3 (L) 11/26/2021    CHLORIDE 100 11/26/2021    CO2 31 11/26/2021    ANIONGAP 7 11/26/2021     (H) 11/26/2021    BUN 15 11/26/2021    CR 1.31 (H) 11/26/2021    GFRESTIMATED 61 11/26/2021    JESSICA 9.5 11/26/2021    BILITOTAL 1.2 11/18/2021    ALBUMIN 3.2 (L) 11/18/2021    ALKPHOS 122 11/18/2021    ALT 17 11/18/2021    AST 37 11/18/2021        INR RESULTS:  Lab Results   Component Value Date    INR 1.22 (H) 11/08/2021       Lab Results   Component Value Date    MAG 2.0 11/12/2021     Lab Results   Component Value Date    NTBNPI 7,641 (H) 11/18/2021     Lab Results   Component Value Date    NTBNP 11,110 (H) 11/16/2021       Assessment and Plan:     Chronic systolic heart failure secondary to NICM (LVEF <10% per TTE 11/2/21)  Stage D, NYHA Class III  - ACEi/ARB/ARNi:  entresto was held during hospitalization due to hypotension- soft BP  - Afterload reduction:  n/a  - BB:  Deferred due to cardiogenic shock  - Aldosterone antagonist: Deferred due to soft Bps in the hospital.  Has been on spironolactone with good tolerance in the past.  - SGLT2i:  Deferred for now, will consider at future appts  - Inotropy: milrinone @ 0.25mcg/kg/min.   - SCD ppx:  Lifevest  - Fluid status: euvolemic, continue torsemide 60/40mg      Polysubstance abuse  History of EtOH abuse as well as prior polysubstance abuse (reports prior use of cocaine, methamphetamine, mushrooms, LSD).  Has had recent meth use within the last 2 months.  He reports  "that he has his EtOH under control, but does still \"sip\" on liquor at times when out with friends.  Drug screening and PETH were negative on 11/2.  He has not completed formal chemical dependency treatment recently.  He has been informed that he will need to complete a chemical dependency program in order to be considered for advanced heart failure therapies in the future.  Reinforced complete abstinence from substances.  Neuropych and social work teams following, to help determine length of sobriety needed.  Will random drug screening per protocol.     DMII  Elevated HgbA1c, has not required insulin.  Managed by PCP.     Gout  Reports extremely longstanding history of gout, and has been taking taking allopurinol and colchicine.  Has painful tophi on his wrist, which he would like removed.  Needs to follow-up with rheumatology and general surgery for further consideration.    Anxiety/depression  Psychiatry consulted while inpatient on 11/4, and he was started on Lexapro 10mg daily.  Note that he did not tolerate hydroxyzine and melatonin.  Continue ramelteon, further management per PCP/psychiatry.       Will not re-start any medications today due to lower BP's and how he is feeling. Would like to reassess after the weekend and if he is better we can possibly trial a start of low dose Entresto. Hypokalemia noted and will have him take a couple extra potassium doses.     - CORE schedule with [facility] - 12/20  - re-start Entresto next week if Systolic BP's are above 100. 12/13 mg BID.   - Potassium extra 40 mEq for 2 days only    Kimberly SOTO NP-C  Advanced Heart Failure Nurse Practitioner  [facility]  Patient Care Team:  No Ref-Primary, Physician as PCP - Zoe Mendez, RN as Specialty Care Coordinator (Cardiology)  Chastity Goldman NP as Nurse Practitioner (Interventional Cardiology)      "

## 2021-11-27 NOTE — PROGRESS NOTES
This is a recent snapshot of the patient's [facility] medical record.  For current drug dose and complete information and questions, call [phone] or In Basket pool, fv home infusion ([id number])  CSN Number:  [id number]

## 2021-11-29 NOTE — TELEPHONE ENCOUNTER
"Called Kavon for an update. He said that yesterday was \"rough\". He is having problems with an upset stomach, denies nausea or vomiting. He says he has no appetite. He says he is extremely fatigued, especially with activity. Weight has been consistent around 194 lbs. Swelling has not changed, Kavon says it looks \"pretty good\". He says overall he has not felt well, which is what prompted him to go to the ER last week. He says they weren't able to do much in the ER and is not sure they would do anything different if he went in again. Kavon's BP over the weekend:    11/27 - 100/72  11/28 -  90/73 11/29 - 89/70    Gianna Gamino RN        "

## 2021-11-29 NOTE — TELEPHONE ENCOUNTER
[facility] Call Center    Phone Message    May a detailed message be left on voicemail: no     Reason for Call: Other: Kavon is returning a phone call, please reach back out to him at [phone].     Action Taken: Message routed to:  [facility] ([facility]): Cardiology    Travel Screening: Not Applicable

## 2021-11-30 NOTE — TELEPHONE ENCOUNTER
"Called Kavon back regarding his symptoms. He is reporting extreme shortness of breath, fatigue, no stamina, \"stomach going crazy\" unbearable and in pain all through his abdomen, under his sternum, and diaphragm area, discomfort. He thinks he is getting worse. meds - he has been taking mylanta - this has not been helping at all.    No Appetite. Frequent nausea, no vomiting.     Went to the ED 11/18 for shortness of breath and chest pressure.     Weight 193.6, Swelling is about the same.  Kavon says he has \"not really been able to sleep at night\", has been able to sleep some during the day. Sleeps sitting up.    Discussed with Kimberly Mcguire NP - her advice is for patient to go in to the ED. Patient is hesitant to go in because last time he felt this way he went in and said they could not help him. I advised him that this is our recommendation for best and quickest treatment.    Patient agreed to schedule CORE appointment tomorrow morning and will go in to the ED overnight if he feels any worse.    Gianna Gamino RN    "

## 2021-11-30 NOTE — TELEPHONE ENCOUNTER
Attempted to call Kavon back x2 regarding his symptoms, voicemail left, will try again later.    Gianna Gamino RN

## 2021-12-01 NOTE — LETTER
12/1/2021      RE: Kavon Banerjee  [address]  [facility] [address]       Dear Colleague,    Thank you for the opportunity to participate in the care of your patient, Kavon Banerjee, at the [facility] [place] at [facility]. Please see a copy of my visit note below.    Advanced Heart Failure Clinic -- CORE Evaluation    HPI:   Mr. Kavon Banerjee is a 56yr old male with a longstanding history of chronic systolic heart failure secondary to NICM (LVEF < 10%, LVEDD 8.0cm), polysubstance abuse (alcohol, meth), newly diagnosed DMII, chronic gout with painful tophi, and anxiety/depression who presents to [facility] for evaluation and for follow-up of recent hospitalization.    Has a longstanding history of NICM, thought to be secondary to polysubstance abuse (meth, EtOH).  He has previously been living in [place], where he reported feeling well until about 2 months when he developed progressive exertional intolerance, PND/orthopnea, abdominal distention, edema.  He was recently hospitalized at [facility], where he was found to be in cardiogenic shock requiring inotropic support.  He left AMA on 11/1, but represented to [facility] ED later that day due to progressively worsening symptoms.  His decline was attributed to his inotrope wean, not optimized medical therapy, and nonadherence to diet and fluid restrictions.     Patient is feeling slightly better since last night but still quite terrible.  He is having significant abdominal pain.  He has not eaten in 2 or 3 days because it hurts so bad.  He has had nausea although he has not thrown up.  He does think that he has been maintaining his fluid intake okay.  He feels short of breath at rest.  He gets dyspnea on exertion walking less than 100 feet.  He has orthopnea and he has PND as well.  He has swelling in his stomach although he does not have swelling in his legs.  He gets lightheadedness when he goes  "from sitting to standing, none with exertion.  No palpitations.  He has had a few \"quick squeezes \"with chest pain since he left the hospital, maybe on 2 occasions, last seconds.  Not associated with other anginal equivalents.  Not sustained.  He has early satiety.    Weight was 195 when he left the hospital. He was 193. He has lost weight since leaving the hospital.     Following a low salt diet.    Cardiac Medications  Milrinone 0.25  Torsemide 60/40  Kcl 40 meq BID  Mag ox 400 mg daily    PAST MEDICAL HISTORY:  No past medical history on file.    FAMILY HISTORY:  Family History   Problem Relation Age of Onset     Hypertension Mother      Hypertension Father      Prostate Cancer Father      Cancer Father      Substance Abuse Father      Hypertension Brother        SOCIAL HISTORY:  Social History     Socioeconomic History     Marital status:      Spouse name: Not on file     Number of children: Not on file     Years of education: Not on file     Highest education level: Not on file   Occupational History     Not on file   Tobacco Use     Smoking status: Never Smoker     Smokeless tobacco: Never Used   Substance and Sexual Activity     Alcohol use: Not Currently     Comment: used to be a heavy drinker up until 50     Drug use: Not on file     Sexual activity: Not on file   Other Topics Concern     Not on file   Social History Narrative     Not on file     Social Determinants of Health     Financial Resource Strain: Not on file   Food Insecurity: Not on file   Transportation Needs: Not on file   Physical Activity: Not on file   Stress: Not on file   Social Connections: Not on file   Intimate Partner Violence: Not on file   Housing Stability: Not on file       CURRENT MEDICATIONS:  Current Outpatient Medications   Medication Sig Dispense Refill     acetaminophen (TYLENOL) 325 MG tablet Take 2 tablets (650 mg) by mouth every 4 hours as needed for mild pain       allopurinol (ZYLOPRIM) 100 MG tablet Take 1 " "tablet (100 mg) by mouth daily 90 tablet 1     alum & mag hydroxide-simethicone (MAALOX) 200-200-20 MG/5ML SUSP suspension Take 30 mLs by mouth every 4 hours as needed for indigestion 355 mL 0     colchicine (COLCYRS) 0.6 MG tablet Take 1 tablet (0.6 mg) by mouth daily 90 tablet 1     escitalopram (LEXAPRO) 10 MG tablet Take 1 tablet (10 mg) by mouth At Bedtime 30 tablet 1     magnesium oxide (MAG-OX) 400 MG tablet Take 1 tablet (400 mg) by mouth daily 30 tablet 1     milrinone (PRIMACOR) infusion 200 mcg/mL PREMIX Inject 22.275 mcg/min into the vein continuous       polyethylene glycol (MIRALAX) 17 GM/Dose powder Take 17 g by mouth 2 times daily as needed       potassium chloride ER (KLOR-CON M) 20 MEQ CR tablet Take 2 tablets (40 mEq) by mouth 2 times daily 120 tablet 1     predniSONE (DELTASONE) 20 MG tablet Take 20 mg by mouth as needed       ramelteon (ROZEREM) 8 MG tablet Take 1 tablet (8 mg) by mouth every evening 30 tablet 0     senna-docusate (SENOKOT-S/PERICOLACE) 8.6-50 MG tablet Take 1 tablet by mouth 2 times daily as needed for constipation       torsemide (DEMADEX) 10 MG tablet Take 6 tablets (60 mg) by mouth every morning AND 4 tablets (40 mg) daily at 2 pm. 600 tablet 1       ROS:   See HPI    EXAM:  BP 93/63 (BP Location: Left arm, Patient Position: Chair, Cuff Size: Adult Regular)   Pulse 90   Ht 1.854 m (6' 1\")   Wt 93.3 kg (205 lb 9.6 oz)   SpO2 97%   BMI 27.13 kg/m    General: appears comfortable, alert and articulate  Head: normocephalic, atraumatic  Eyes: anicteric sclera, EOMI  Neck: no adenopathy  Orophyarynx: moist mucosa, no lesions, dentition intact  Heart: regular, S1/S2, no murmur, gallop, rub, estimated JVP mid neck at 90 degrees  Lungs: clear, no rales or wheezing  Abdomen: soft, non-tender, bowel sounds present, no hepatosplenomegaly  Extremities: no clubbing, cyanosis or edema  Neurological: normal speech and affect, no gross motor deficits    Labs:  CBC RESULTS:  Lab Results "   Component Value Date    WBC 6.9 12/01/2021    RBC 4.85 12/01/2021    HGB 11.4 (L) 12/01/2021    HCT 36.8 (L) 12/01/2021    MCV 76 (L) 12/01/2021    MCH 23.5 (L) 12/01/2021    MCHC 31.0 (L) 12/01/2021    RDW 24.2 (H) 12/01/2021     12/01/2021       CMP RESULTS:  Lab Results   Component Value Date     12/01/2021    POTASSIUM 4.4 12/01/2021    CHLORIDE 100 12/01/2021    CO2 25 12/01/2021    ANIONGAP 12 12/01/2021     (H) 12/01/2021    BUN 24 12/01/2021    CR 1.67 (H) 12/01/2021    GFRESTIMATED 46 (L) 12/01/2021    JESSICA 9.2 12/01/2021    BILITOTAL 2.1 (H) 12/01/2021    ALBUMIN 3.4 12/01/2021    ALKPHOS 132 12/01/2021    ALT 16 12/01/2021    AST 19 12/01/2021        INR RESULTS:  Lab Results   Component Value Date    INR 1.22 (H) 11/08/2021       Lab Results   Component Value Date    MAG 2.0 11/12/2021     Lab Results   Component Value Date    NTBNPI 7,641 (H) 11/18/2021     Lab Results   Component Value Date    NTBNP 4,432 (H) 12/01/2021       Assessment and Plan:   Mr. Kavon Banerjee is a 56yr old male with a longstanding history of chronic systolic heart failure secondary to NICM (LVEF < 10%, LVEDD 8.0cm), polysubstance abuse (alcohol, meth), newly diagnosed DMII, chronic gout with painful tophi, and anxiety/depression who [place] [facility] [place]    Patient is on home inotropes as a bridge to possible candidacy for advanced therapies.  His barriers to advanced therapy have been compliance (had not been regularly seeing a cardiologist, had had AMA departures) and a history of substance abuse (meth and alcohol as well as other drugs as well).  Since leaving the hospital on milrinone he has been regularly coming to core and has been compliant with his medications.  Unfortunately he is not wearing his LifeVest because it is exacerbating his stomach pain so severely.    He comes to clinic today with class IV symptoms, most significantly he is having severe abdominal pain and nausea.  I am concerned for low  output state.  He does look mildly hypervolemic to me as well so could benefit from some diuresis.  Note that his BNP is significantly improved from when he was in the hospital.  However he does have an PARUL with a creatinine up to 1.7 from a creatinine closer to 1.  He also has a bilirubin of 2.1 from a total bilirubin of 0.5 a few weeks ago.    Given his severe symptomatology he is to present to the ER now for work-up of his abdominal pain.  I anticipate he will need cards to admission.  I requested a bed but there are not available at this time.  Dr. Stein is aware of this patient.  This patient may need an increase in milrinone.  I am not comfortable doing this at home as he is not wearing his LifeVest and this would put him at increased risk of arrhythmias.  I discussed the case with Dr. Franklin, the patient is still not a candidate for advanced therapies but on discussion with Dr. Franklin we would consider him a candidate for ICD at this time.  I think that that should be considered this hospital stay given his inability to wear the LifeVest due to abdominal bloating.  He has already called the company for an upsize once and was only a temporary fix.      Chronic systolic heart failure secondary to NICM (LVEF <10% per TTE 11/2/21)  Stage D, NYHA Class III  - ACEi/ARB/ARNi:  entresto was held during hospitalization due to hypotension- soft BP  - Afterload reduction:  n/a  - BB:  Deferred due to cardiogenic shock  - Aldosterone antagonist: Deferred due to soft Bps in the hospital.  Has been on spironolactone with good tolerance in the past.  - SGLT2i:  Deferred for now, will consider at future appts  - Inotropy: milrinone @ 0.25mcg/kg/min. May need to consider increase   - SCD ppx:  Lifevest- Consider ICD this admission, discussed with Dr. Franklin  - Fluid status: mild hypervolemia- management per ER and inpatient team     Polysubstance abuse  History of EtOH abuse as well as prior polysubstance abuse (reports prior  "use of cocaine, methamphetamine, mushrooms, LSD).  Has had recent meth use within the last month.  He reports that he has his EtOH under control, but does still \"sip\" on liquor at times when out with friends.  Drug screening and PETH were negative on 11/2.  He has not completed formal chemical dependency treatment recently.  He has been informed that he will need to complete a chemical dependency program in order to be considered for advanced heart failure therapies in the future.  Reinforced complete abstinence from substances.  Neuropych and social work teams following, to help determine length of sobriety needed.  Will random drug screening per protocol.     DMII  Elevated HgbA1c, has not required insulin.  Managed by PCP.     Gout  Reports extremely longstanding history of gout, and has been taking taking allopurinol and colchicine.  Has painful tophi on his wrist, which he would like removed.  Needs to follow-up with rheumatology and general surgery for further consideration.    Anxiety/depression  Psychiatry consulted while inpatient on 11/4, and he was started on Lexapro 10mg daily.  Note that he did not tolerate hydroxyzine and melatonin.  Continue ramelteon, further management per PCP/psychiatry.       Follow-up:  - Present to the ER immediately  - scheduled for CORE on Friay, but if still admitted will defer f/u to inpatient team    Billing:  - I reviewed 1 lab  -I managed 2 chronic conditions and one condition with a severe exacerbation  -I discussed the case and management with 2 providers (Dr. Stein and Dr. Franklin)  -I made a decision regarding hospitalization.      Karla Ragland PA-C  Advanced Heart Failure   [facility]  Patient Care Team:  No Ref-Primary, Physician as PCP - Zoe Mendez, RN as Specialty Care Coordinator (Cardiology)  Chastity Goldman NP as Nurse Practitioner (Interventional Cardiology)      "

## 2021-12-01 NOTE — PROGRESS NOTES
"Advanced Heart Failure Clinic -- CORE Evaluation    HPI:   Mr. Kavon Banerjee is a 56yr old male with a longstanding history of chronic systolic heart failure secondary to NICM (LVEF < 10%, LVEDD 8.0cm), polysubstance abuse (alcohol, meth), newly diagnosed DMII, chronic gout with painful tophi, and anxiety/depression who presents to [facility] for evaluation and for follow-up of recent hospitalization.    Has a longstanding history of NICM, thought to be secondary to polysubstance abuse (meth, EtOH).  He has previously been living in [place], where he reported feeling well until about 2 months when he developed progressive exertional intolerance, PND/orthopnea, abdominal distention, edema.  He was recently hospitalized at [facility], where he was found to be in cardiogenic shock requiring inotropic support.  He left AMA on 11/1, but represented to [facility] ED later that day due to progressively worsening symptoms.  His decline was attributed to his inotrope wean, not optimized medical therapy, and nonadherence to diet and fluid restrictions.     Patient is feeling slightly better since last night but still quite terrible.  He is having significant abdominal pain.  He has not eaten in 2 or 3 days because it hurts so bad.  He has had nausea although he has not thrown up.  He does think that he has been maintaining his fluid intake okay.  He feels short of breath at rest.  He gets dyspnea on exertion walking less than 100 feet.  He has orthopnea and he has PND as well.  He has swelling in his stomach although he does not have swelling in his legs.  He gets lightheadedness when he goes from sitting to standing, none with exertion.  No palpitations.  He has had a few \"quick squeezes \"with chest pain since he left the hospital, maybe on 2 occasions, last seconds.  Not associated with other anginal equivalents.  Not sustained.  He has early satiety.    Weight was 195 when he left the hospital. He was 193. He has lost weight since leaving " the hospital.     Following a low salt diet.    Cardiac Medications  Milrinone 0.25  Torsemide 60/40  Kcl 40 meq BID  Mag ox 400 mg daily    PAST MEDICAL HISTORY:  No past medical history on file.    FAMILY HISTORY:  Family History   Problem Relation Age of Onset     Hypertension Mother      Hypertension Father      Prostate Cancer Father      Cancer Father      Substance Abuse Father      Hypertension Brother        SOCIAL HISTORY:  Social History     Socioeconomic History     Marital status:      Spouse name: Not on file     Number of children: Not on file     Years of education: Not on file     Highest education level: Not on file   Occupational History     Not on file   Tobacco Use     Smoking status: Never Smoker     Smokeless tobacco: Never Used   Substance and Sexual Activity     Alcohol use: Not Currently     Comment: used to be a heavy drinker up until 50     Drug use: Not on file     Sexual activity: Not on file   Other Topics Concern     Not on file   Social History Narrative     Not on file     Social Determinants of Health     Financial Resource Strain: Not on file   Food Insecurity: Not on file   Transportation Needs: Not on file   Physical Activity: Not on file   Stress: Not on file   Social Connections: Not on file   Intimate Partner Violence: Not on file   Housing Stability: Not on file       CURRENT MEDICATIONS:  Current Outpatient Medications   Medication Sig Dispense Refill     acetaminophen (TYLENOL) 325 MG tablet Take 2 tablets (650 mg) by mouth every 4 hours as needed for mild pain       allopurinol (ZYLOPRIM) 100 MG tablet Take 1 tablet (100 mg) by mouth daily 90 tablet 1     alum & mag hydroxide-simethicone (MAALOX) 200-200-20 MG/5ML SUSP suspension Take 30 mLs by mouth every 4 hours as needed for indigestion 355 mL 0     colchicine (COLCYRS) 0.6 MG tablet Take 1 tablet (0.6 mg) by mouth daily 90 tablet 1     escitalopram (LEXAPRO) 10 MG tablet Take 1 tablet (10 mg) by mouth At  "Bedtime 30 tablet 1     magnesium oxide (MAG-OX) 400 MG tablet Take 1 tablet (400 mg) by mouth daily 30 tablet 1     milrinone (PRIMACOR) infusion 200 mcg/mL PREMIX Inject 22.275 mcg/min into the vein continuous       polyethylene glycol (MIRALAX) 17 GM/Dose powder Take 17 g by mouth 2 times daily as needed       potassium chloride ER (KLOR-CON M) 20 MEQ CR tablet Take 2 tablets (40 mEq) by mouth 2 times daily 120 tablet 1     predniSONE (DELTASONE) 20 MG tablet Take 20 mg by mouth as needed       ramelteon (ROZEREM) 8 MG tablet Take 1 tablet (8 mg) by mouth every evening 30 tablet 0     senna-docusate (SENOKOT-S/PERICOLACE) 8.6-50 MG tablet Take 1 tablet by mouth 2 times daily as needed for constipation       torsemide (DEMADEX) 10 MG tablet Take 6 tablets (60 mg) by mouth every morning AND 4 tablets (40 mg) daily at 2 pm. 600 tablet 1       ROS:   See HPI    EXAM:  BP 93/63 (BP Location: Left arm, Patient Position: Chair, Cuff Size: Adult Regular)   Pulse 90   Ht 1.854 m (6' 1\")   Wt 93.3 kg (205 lb 9.6 oz)   SpO2 97%   BMI 27.13 kg/m    General: appears comfortable, alert and articulate  Head: normocephalic, atraumatic  Eyes: anicteric sclera, EOMI  Neck: no adenopathy  Orophyarynx: moist mucosa, no lesions, dentition intact  Heart: regular, S1/S2, no murmur, gallop, rub, estimated JVP mid neck at 90 degrees  Lungs: clear, no rales or wheezing  Abdomen: soft, non-tender, bowel sounds present, no hepatosplenomegaly  Extremities: no clubbing, cyanosis or edema  Neurological: normal speech and affect, no gross motor deficits    Labs:  CBC RESULTS:  Lab Results   Component Value Date    WBC 6.9 12/01/2021    RBC 4.85 12/01/2021    HGB 11.4 (L) 12/01/2021    HCT 36.8 (L) 12/01/2021    MCV 76 (L) 12/01/2021    MCH 23.5 (L) 12/01/2021    MCHC 31.0 (L) 12/01/2021    RDW 24.2 (H) 12/01/2021     12/01/2021       CMP RESULTS:  Lab Results   Component Value Date     12/01/2021    POTASSIUM 4.4 12/01/2021    " CHLORIDE 100 12/01/2021    CO2 25 12/01/2021    ANIONGAP 12 12/01/2021     (H) 12/01/2021    BUN 24 12/01/2021    CR 1.67 (H) 12/01/2021    GFRESTIMATED 46 (L) 12/01/2021    JESSICA 9.2 12/01/2021    BILITOTAL 2.1 (H) 12/01/2021    ALBUMIN 3.4 12/01/2021    ALKPHOS 132 12/01/2021    ALT 16 12/01/2021    AST 19 12/01/2021        INR RESULTS:  Lab Results   Component Value Date    INR 1.22 (H) 11/08/2021       Lab Results   Component Value Date    MAG 2.0 11/12/2021     Lab Results   Component Value Date    NTBNPI 7,641 (H) 11/18/2021     Lab Results   Component Value Date    NTBNP 4,432 (H) 12/01/2021       Assessment and Plan:   Mr. Kavon Banerjee is a 56yr old male with a longstanding history of chronic systolic heart failure secondary to NICM (LVEF < 10%, LVEDD 8.0cm), polysubstance abuse (alcohol, meth), newly diagnosed DMII, chronic gout with painful tophi, and anxiety/depression who [place] [facility] [place]    Patient is on home inotropes as a bridge to possible candidacy for advanced therapies.  His barriers to advanced therapy have been compliance (had not been regularly seeing a cardiologist, had had AMA departures) and a history of substance abuse (meth and alcohol as well as other drugs as well).  Since leaving the hospital on milrinone he has been regularly coming to core and has been compliant with his medications.  Unfortunately he is not wearing his LifeVest because it is exacerbating his stomach pain so severely.    He comes to clinic today with class IV symptoms, most significantly he is having severe abdominal pain and nausea.  I am concerned for low output state.  He does look mildly hypervolemic to me as well so could benefit from some diuresis.  Note that his BNP is significantly improved from when he was in the hospital.  However he does have an PARUL with a creatinine up to 1.7 from a creatinine closer to 1.  He also has a bilirubin of 2.1 from a total bilirubin of 0.5 a few weeks ago.    Given his  "severe symptomatology he is to present to the ER now for work-up of his abdominal pain.  I anticipate he will need cards to admission.  I requested a bed but there are not available at this time.  Dr. Stein is aware of this patient.  This patient may need an increase in milrinone.  I am not comfortable doing this at home as he is not wearing his LifeVest and this would put him at increased risk of arrhythmias.  I discussed the case with Dr. Franklin, the patient is still not a candidate for advanced therapies but on discussion with Dr. Franklin we would consider him a candidate for ICD at this time.  I think that that should be considered this hospital stay given his inability to wear the LifeVest due to abdominal bloating.  He has already called the company for an upsize once and was only a temporary fix.      Chronic systolic heart failure secondary to NICM (LVEF <10% per TTE 11/2/21)  Stage D, NYHA Class III  - ACEi/ARB/ARNi:  entresto was held during hospitalization due to hypotension- soft BP  - Afterload reduction:  n/a  - BB:  Deferred due to cardiogenic shock  - Aldosterone antagonist: Deferred due to soft Bps in the hospital.  Has been on spironolactone with good tolerance in the past.  - SGLT2i:  Deferred for now, will consider at future appts  - Inotropy: milrinone @ 0.25mcg/kg/min. May need to consider increase   - SCD ppx:  Lifevest- Consider ICD this admission, discussed with Dr. Franklin  - Fluid status: mild hypervolemia- management per ER and inpatient team     Polysubstance abuse  History of EtOH abuse as well as prior polysubstance abuse (reports prior use of cocaine, methamphetamine, mushrooms, LSD).  Has had recent meth use within the last month.  He reports that he has his EtOH under control, but does still \"sip\" on liquor at times when out with friends.  Drug screening and PETH were negative on 11/2.  He has not completed formal chemical dependency treatment recently.  He has been informed that he " will need to complete a chemical dependency program in order to be considered for advanced heart failure therapies in the future.  Reinforced complete abstinence from substances.  Neuropych and social work teams following, to help determine length of sobriety needed.  Will random drug screening per protocol.     DMII  Elevated HgbA1c, has not required insulin.  Managed by PCP.     Gout  Reports extremely longstanding history of gout, and has been taking taking allopurinol and colchicine.  Has painful tophi on his wrist, which he would like removed.  Needs to follow-up with rheumatology and general surgery for further consideration.    Anxiety/depression  Psychiatry consulted while inpatient on 11/4, and he was started on Lexapro 10mg daily.  Note that he did not tolerate hydroxyzine and melatonin.  Continue ramelteon, further management per PCP/psychiatry.       Follow-up:  - Present to the ER immediately  - scheduled for CORE on Friay, but if still admitted will defer f/u to inpatient team    Billing:  - I reviewed 1 lab  -I managed 2 chronic conditions and one condition with a severe exacerbation  -I discussed the case and management with 2 providers (Dr. Stein and Dr. Franklin)  -I made a decision regarding hospitalization.      Karla Ragland PA-C  Advanced Heart Failure   [facility]  Patient Care Team:  No Ref-Primary, Physician as PCP - Zoe Mendez, RN as Specialty Care Coordinator (Cardiology)  Chastity Goldman NP as Nurse Practitioner (Interventional Cardiology)

## 2021-12-01 NOTE — PROGRESS NOTES
Admission          12/1/2021 12:40 PM  -----------------------------------------------------------  Diagnosis:    Admitted from: [facility]    Via: stretcher  Accompanied by: family  Family Aware of Admission: Yes  Belongings: Placed in closet; valuables sent home with family  Admission Profile: complete  Teaching: orientation to unit, call don't fall, use of console, meal times, visiting hours,  when to call for the RN (angina/sob/dizzyness, etc.), and enforced importance of safety   Access: PIV  Telemetry:Placed on pt  Ht./Wt.: complete    2 person skin check completed with Cheryl PEREIRA     Temp:  [98.1  F (36.7  C)] 98.1  F (36.7  C)  Pulse:  [89-90] 89  Resp:  [16] 16  BP: (93-97)/(63-74) 97/74  SpO2:  [97 %-100 %] 100 %

## 2021-12-01 NOTE — H&P
"  [facility]   Cardiology II Service / Advanced Heart Failure  History & Physical    Kavon Banerjee  : 1965  MRN # [id number]    ADMIT DATE: 2021    PCP: No Ref-Primary, Physician    CHIEF COMPLAINT: Abdominal Pain    HPI: Kavon Banerjee is a 56 year old male with history of chronic systolic heart failure 2/2 NICM (LVEF < 10%), polysubstance abuse (EtOH, meth), newly diagnosed T2DM, chronic gout, and anxiety/depression who presents with worsening abdominal pain since most recent admission with increased DUNN and SOB. He has not been able to eat for 4-5 days due to abdominal discomfort and has been experiencing nausea but no emesis. He reports loose stools daily with episodes of hematochezia and is aware he is in need of a colonoscopy per previous admission recommendations.    He was recently hospitalized at OSH and found to be in cardiogenic shock requiring inotropic support but left AMA, representing to [facility] ED later that day due to worsening symptoms. His decline was attributed to his inotrope wean, not optimized medical therapy, and nonadherence to diet and fluid restrictions.    Patient arrived to [facility] for evaluation early today reporting feeling \"terrible\". He reported feeling short of breath at rest and dyspnea with minor movements. He reported a dry cough and the sensation of starving for air. His abdomen was found to be edematous but no edema to BLE. Lightheadedness occurring when moving from seated to standing but none with exertion. Nausea is described as waxing and waning but no emesis reported. Weight was 195 upon most recent hospital discharge and was 193 today.    ASSESSMENT & PLAN:  Kavon Banerjee is a 56 year old male who presents with abdominal pain, DUNN, SOB, nausea, early satiety, and hematochezia. He has not eaten in 4-5 days but continues to have loose stools and passing excessive gas which provides temporary pain relief. Labs ordered    Today's Plan:  - PTA meds " ordered  - Labs ordered  - Urine drug screen ordered  - Increase milrinone infusion to 0.375mcg/kg/min  - Start Lasix 40 mg IV for overnight  - Dilaudid   - Ppx Protonix 40 mg IV initiated  - RHC planned for tomorrow. NPO at 0000 12/02.    # Chronic Systolic HF 2/2 NICM with EF < 10%  # Abdminal pain  # Hyponatremia  # Inotrope dependence  Stage D, NYHA Class IV  Patient is on home inotropes as bridge to possible candidacy for advanced therapies. He has not been wearing his LifeVest due to it exacerbating his abdominal pain.  - Lactate 2.1 12/01. Repeat labs ordered for overnight  - Venous blood gas: Oxyhgb 31% 12/01. Repeat labs ordered for overnight  - Lipase/amylase unremarkable  - Troponin in process  - Sodium 132 12/01    - ACEI/ARB/ARNI: discontinued due to low BP  - Afterload reduction: n/a  - BB: deferred due to cardiogenic shock  - Aldosterone antagonist: deferred due to low BP  - SGLT2i: deferred for now  - Inotropy: Milrinone increased to 0.375mcg/kg/min  - SCD placement: consider ICD this admission  - Fluid status: Lasix 40 mg IV given 12/01  - Dilaudid injection 0.2 mg IV q4hrs as needed for moderate-severe pain    # Gout  Continue allopurinol and colchicine plan.    # T2DM  Elevated A1c but has not required insulin. Managed by PCP    # Polysubstance Abuse  Consider chem dep referral. Reports prior use of cocaine, methamphetamine, mushrooms, LSD. Most recent meth use within the last month. Reports having his EtOH use under control but still drinks liquor from time to time socially. He is aware he will need to complete chem dep program in order to be considered for advanced heart therapies in the future.   - Urine drug screen pending  - PEth negative 12/1    # Anxiety/Depression  Continue Lexapro 10 mg daily      FEN: NPO for RHC tomorrow  PROPHY: regular  DISPO:  Plan to discharge to home pending treatment of cardiogenic shock  CODE STATUS:  Full    ROS:   CONSTITUTIONAL: Denies fever, chills, fatigue.  Reports minor weight loss  HEAD: Denies headache.  EENT: Denies vision changes, hearing changes, dysphagia, or sore throat.   NECK: Denies lymphadenopathy.   CV:Denies chest pain, palpitations. Endorses worsening SOB and DUNN  PULMONARY: Endores SOB at rest, dry cough. Denies previous TB exposure. Negative for COVID  GI: Nausea without emesis. Loose stools with occurrences of hematochezia. Severe abdominal pain localized to mid sternal/diaphragm  :Denies urinary alterations, dysuria, urinary frequency, hematuria, and abnormal drainage.   EXT:Denies lower extremity edema.   SKIN:Denies abnormal rashes or lesions.   MUSCULOSKELETAL:Denies upper or lower extremity weakness and pain.   NEUROLOGIC:Denies dizziness, seizures, or upper or lower extremity paresthesia. Reports episodes of lightheadedness  HEMATOLOGICAL:No abnormal bruising or bleeding.   PSYCHIATRIC:Denies any mood alterations.     PMH:  No past medical history on file.    PSH:  Past Surgical History:   Procedure Laterality Date     CV RIGHT HEART CATH MEASUREMENTS RECORDED N/A 11/04/2021    Procedure: Heart Cath Right Heart Cath;  Surgeon: Rome Franklin MD;  Location:  HEART CARDIAC CATH LAB     PICC SINGLE LUMEN PLACEMENT Right 11/09/2021    44cm (1cm external), Basilic vein       MEDICATIONS:  Prior to Admission Medications   Prescriptions Last Dose Informant Patient Reported? Taking?   acetaminophen (TYLENOL) 325 MG tablet   No No   Sig: Take 2 tablets (650 mg) by mouth every 4 hours as needed for mild pain   allopurinol (ZYLOPRIM) 100 MG tablet   No No   Sig: Take 1 tablet (100 mg) by mouth daily   alum & mag hydroxide-simethicone (MAALOX) 200-200-20 MG/5ML SUSP suspension   No No   Sig: Take 30 mLs by mouth every 4 hours as needed for indigestion   colchicine (COLCYRS) 0.6 MG tablet   No No   Sig: Take 1 tablet (0.6 mg) by mouth daily   escitalopram (LEXAPRO) 10 MG tablet   No No   Sig: Take 1 tablet (10 mg) by mouth At Bedtime   magnesium oxide  (MAG-OX) 400 MG tablet   No No   Sig: Take 1 tablet (400 mg) by mouth daily   milrinone (PRIMACOR) infusion 200 mcg/mL PREMIX   No No   Sig: Inject 22.275 mcg/min into the vein continuous   polyethylene glycol (MIRALAX) 17 GM/Dose powder   Yes No   Sig: Take 17 g by mouth 2 times daily as needed   potassium chloride ER (KLOR-CON M) 20 MEQ CR tablet   No No   Sig: Take 2 tablets (40 mEq) by mouth 2 times daily   predniSONE (DELTASONE) 20 MG tablet   Yes No   Sig: Take 20 mg by mouth as needed   ramelteon (ROZEREM) 8 MG tablet   No No   Sig: Take 1 tablet (8 mg) by mouth every evening   senna-docusate (SENOKOT-S/PERICOLACE) 8.6-50 MG tablet   No No   Sig: Take 1 tablet by mouth 2 times daily as needed for constipation   torsemide (DEMADEX) 10 MG tablet   No No   Sig: Take 6 tablets (60 mg) by mouth every morning AND 4 tablets (40 mg) daily at 2 pm.      Facility-Administered Medications: None        ALLERGIES:     Allergies   Allergen Reactions     Pneumococcal Polysaccharides Other (See Comments)       FAMILY HISTORY:  Family History   Problem Relation Age of Onset     Hypertension Mother      Hypertension Father      Prostate Cancer Father      Cancer Father      Substance Abuse Father      Hypertension Brother        SOCIAL HISTORY:  Social History     Socioeconomic History     Marital status:      Spouse name: Not on file     Number of children: Not on file     Years of education: Not on file     Highest education level: Not on file   Occupational History     Not on file   Tobacco Use     Smoking status: Never Smoker     Smokeless tobacco: Never Used   Substance and Sexual Activity     Alcohol use: Not Currently     Comment: used to be a heavy drinker up until 50     Drug use: Not on file     Sexual activity: Not on file   Other Topics Concern     Not on file   Social History Narrative     Not on file     Social Determinants of Health     Financial Resource Strain: Not on file   Food Insecurity: Not on  file   Transportation Needs: Not on file   Physical Activity: Not on file   Stress: Not on file   Social Connections: Not on file   Intimate Partner Violence: Not on file   Housing Stability: Not on file       PHYSICAL EXAM:  Blood pressure 97/74, pulse 85, temperature 98.1  F (36.7  C), temperature source Oral, resp. rate 16, weight 92.5 kg (203 lb 14.4 oz), SpO2 100 %.    GENERAL: Acute distress due to severe pain  HEENT: Eye symmetrical, no discharge or icterus bilaterally. Mucous membranes moist and without lesions.  NECK: Supple, JVD to mid neck at 45 degrees.  CV: RRR  RESPIRATORY: Respirations regular, even, and unlabored. Lungs CTA throughout.   GI: Soft abdomen and non distended with normoactive bowel sounds present in all quadrants. No tenderness, rebound, guarding. No organomegaly.   EXTREMITIES: No BLE peripheral edema. 2+ bilateral pedal pulses.   NEUROLOGIC: Alert and orientated x 3. No focal deficits.   MUSCULOSKELETAL: No joint swelling or tenderness.   SKIN: No jaundice. No rashes or lesions.     LABS:  CBC:  Recent Labs   Lab Test 12/01/21  0646   WBC 6.9   RBC 4.85   HGB 11.4*   HCT 36.8*   MCV 76*   MCH 23.5*   MCHC 31.0*   RDW 24.2*          CMP:  Recent Labs   Lab Test 12/01/21  0646      POTASSIUM 4.4   CHLORIDE 100   JESSICA 9.2   CO2 25   BUN 24   CR 1.67*   *   AST 19   ALT 16   BILITOTAL 2.1*   ALBUMIN 3.4   PROTTOTAL 6.7*   ALKPHOS 132       IMAGING:  None     Time/Communication  I personally spent a total of 25 minutes. Of that 20 minutes was counseling/coordination of patient's care. Plan of care discussed with patient. See my note above for details.    Report Completed By  JOSEFINA Skinner    I saw the patient with the student and I agree with student's documentation and findings. The note above reflects our joint assessment and plan.       Connie Kam DNP, NP-C  Advanced Heart Failure/Cardiology II Service  Pager [phone]  12/1/2021

## 2021-12-01 NOTE — NURSING NOTE
Per provider patient needs further evaluation at hospital. Tried for direct admission but per admitting no beds available.   Sent to ER for Class IV worsening HF symptoms. Called report to Charge nurse in ER and advised them that Cards 2 Dr. Stein is aware of patient coming to ER.   Jenna Sharif RN

## 2021-12-01 NOTE — NURSING NOTE
Chief Complaint   Patient presents with     Follow Up     Core      Vitals were taken and medications were reconciled.   MARY ELLEN Dang  7:06 AM

## 2021-12-01 NOTE — PROGRESS NOTES
Shift Summary 13:    Admitted from [facility] clinic on 12/1 for abdominal pain, nausea, swelling in abdomen and SOB at rest. PMH DM2, Drug abuse (meth and alcohol), gout, anxiety/depression, and NICM.    Neuro: A&Ox4.   Cardiac: Afebrile, VSS.   Respiratory: RA   GI/: Voiding spontaneously. No BM this shift. LBM 12/1.    Diet/appetite: Tolerating cardiac diet. NPO at midnight. Denies nausea   Activity: UAL   Pain: C/o abdominal pain; given PRN dilaudid.   Skin: R wrist abscess/gout lump. No intervention needed. Pt has plans for evaluation.   Lines: R PICC SL infusing-came to hospital with this in. R PIV SL.   Drains: none   Replacement: none     Increased continuous milrinone to 0.375 mcg/kg/min   -given 1x dose 40mg Lasix IV.     Pt has been resting comfortably, will continue to monitor and follow plan of care. [facility] tomorrow. Cards 2

## 2021-12-01 NOTE — PATIENT INSTRUCTIONS
"Go to the [place] ER for further treatment. We are concerned about worsening heart failure. We have notified the Heart Failure Dr on service that you will be coming to the ER.     Jenna Sharif RN CHFN  [facility] Nurse Coordinator  Questions and [phone]   First press #1 for the [facility] and then press #4 for \"Medical Advice\" to reach a Cardiology Nurse.    "

## 2021-12-01 NOTE — TELEPHONE ENCOUNTER
Called Kavon as he has not made it to the ER yet. Was advised to go directly to ER from clinic this morning. He reports he is on his way right now.   Jenna Sharif RN

## 2021-12-02 NOTE — PROGRESS NOTES
Brief Addiction note    Addiction team is aware of the consult  He is known to our service  No new issues or use of meth or ETOH recently    Will likely be seen by the Addiction team next week    Raymond Mcgee MD  [facility]   Contact information available via [facility] Paging/Directory  Please see sign in/sign out for up to date coverage information     ChAT team (Addiction Consult Team): Coverage Monday-Friday 8-4pm     Provider (Pager)  (Pager)   Mon Dr. Elias Goode [phone]   Tues Dr. Elias Goode [phone]   Wed Dr. Elias Goode [phone] None   Thurs Dr. Raymond Mcgee [phone]   Fri Dr. Osei Valdes [phone]   Sat-[facility] Team- Refer to [facility].  For urgent needs, please place a  consult for psychiatry. None

## 2021-12-02 NOTE — PROGRESS NOTES
[facility]   Cardiology II Service / Advanced Heart Failure  Daily Progress Note  Date of Service: 12/2/2021      Patient: Kavon Banerjee  MRN: [id number]  Admission Date: 12/1/2021  Hospital Day # 1    Assessment and Plan:   Kavon Banerjee is a 56 year old male with history of chronic systolic heart failure 2/2 NICM (LVEF < 10%), polysubstance abuse (EtOH, meth), newly diagnosed T2DM, chronic gout, and anxiety/depression who presents with worsening abdominal pain since most recent admission. Found to be in cardiogenic shock as OSH and [facility] at last admission (11/1). Decline attributed to inotrope wean, not optimized medical therapy, and nonadherence to diet and fluid restrictions.    Today's Plan:  - RHC today, NPO  - Consider increasing milrinone to 0.5 pending RHC results  - Diuresis pending RHC  - Dilaudid prn available  - Protonix switched to PO  - EP consult for advanced heart failure therapy options     # Chronic Systolic HF 2/2 NICM with EF < 10%  # Abdominal pain 2/2 poor perfusion, resolved  # Hyponatremia, resolved  # Inotrope dependence  Stage D, NYHA Class IV  Patient is on home inotropes as bridge to possible candidacy for advanced therapies. He has not been wearing his LifeVest due to it exacerbating his abdominal pain.  - Lactate 1.1 12/02 (2.1 on admission)  - Venous blood gas: Oxyhgb 31% at admission. Repeat labs proved to be inaccurate - RHC scheduled today  - Lipase/amylase unremarkable  - Troponin unremarkable  - Sodium 132 12/01     - ACEI/ARB/ARNI: discontinued due to low BP  - Afterload reduction: n/a  - BB: deferred due to cardiogenic shock  - Aldosterone antagonist: deferred due to low BP  - SGLT2i: deferred for now  - Inotropy: Milrinone increased to 0.375mcg/kg/min 12/01  - SCD placement: consider ICD this admission  - Fluid status: Lasix 40 mg IV given 12/01  - Dilaudid injection 0.2 mg IV q4hrs as needed for moderate-severe pain    # Goals of care  - palliative care  consulted given prior DNR status and inotrope dependence ?candidate for adv therapies  - EP consult made, will pursue primary prevention ICD if in line with goals of care    # Gout  Continue allopurinol and colchicine plan.     # T2DM  Elevated A1c but has not required insulin. Managed by PCP     # Polysubstance Abuse  Consider chem dep referral. Reports prior use of cocaine, methamphetamine, mushrooms, LSD. Most recent meth use within the last month. Reports having his EtOH use under control but still drinks liquor from time to time socially. He is aware he will need to complete chem dep program in order to be considered for advanced heart therapies in the future.   - Urine drug screen negative 12/1  - PEth negative 12/1  - addiction medicine reconsulted/made aware patient readmitted     # Anxiety/Depression  Continue Lexapro 10 mg daily    # Cataracts  Surgery scheduled for 12/15 at outside hospital.       FEN: NPO for RHC today  PROPHY: regular  DISPO:  Plan to discharge to home pending treatment of cardiogenic shock  CODE STATUS:  Full  ==========================================    Interval History/ROS: Ashely says that he no longer feels any nausea nor abdominal pain, and no longer experiencing any cough/air starvation since receiving dilaudid overnight. Slept well through the night. Still no emesis and reports last BM was yesterday. Reports good urine output overnight. Denies chest pain, fever, chills, hematochezia, or melena.    Last 24 hr care team notes reviewed.   ROS:  4 point ROS including Respiratory, CV, GI and , other than that noted in the HPI, is negative.     Medications: Reviewed in EPIC.     Physical Exam:   /80   Pulse 85   Temp 97.9  F (36.6  C) (Oral)   Resp 16   Wt 89.4 kg (197 lb 1.6 oz)   SpO2 94%   BMI 26.00 kg/m       GENERAL: Appears alert and oriented times three.   HEENT: Eye symmetrical and free of discharge bilaterally. Mucous membranes moist and without lesions.  NECK:  Supple and without lymphadenopathy. JVD to midneck.   CV: RRR, S1S2 present without murmur, rub, or gallop.   RESPIRATORY: Respirations regular, even, and unlabored. Lungs CTA throughout.   GI: Soft and non distended with normoactive bowel sounds present in all quadrants. No tenderness, rebound, guarding. No organomegaly.   EXTREMITIES: No peripheral edema. 2+ bilateral pedal pulses.   NEUROLOGIC: Alert and orientated x 3.   MUSCULOSKELETAL: No joint swelling or tenderness.   SKIN: No jaundice. No rashes or lesions.     Data:  CMPRecent Labs   Lab 12/02/21  0334 12/01/21  1443 12/01/21  0646 11/26/21  1055    132* 137 138   POTASSIUM 3.5 4.5  4.5 4.4 3.3*   CHLORIDE 102 102 100 100   CO2 25 19* 25 31   ANIONGAP 8 11 12 7   * 68* 103* 101*   BUN 24 27 24 15   CR 1.58* 1.62* 1.67* 1.31*   GFRESTIMATED 49* 47* 46* 61   JESSICA 8.8 9.2 9.2 9.5   MAG  --  2.5*  --   --    PROTTOTAL 6.7* 6.9 6.7*  --    ALBUMIN 3.4 3.4 3.4  --    BILITOTAL 2.1* 2.3* 2.1*  --    ALKPHOS 128 129 132  --    AST 22 26 19  --    ALT 17 14 16  --      CBC  Recent Labs   Lab 12/02/21  0334 12/01/21  0646   WBC 6.0 6.9   RBC 5.05 4.85   HGB 12.0* 11.4*   HCT 39.5* 36.8*   MCV 78 76*   MCH 23.8* 23.5*   MCHC 30.4* 31.0*   RDW 24.8* 24.2*    245     INRNo lab results found in last 7 days.     Report Completed By  JOSEFINA Skinner    I saw the patient with the student and I agree with student's documentation and findings. The note above reflects our joint assessment and plan.      Connie Kam DNP, NP-C  Advanced Heart Failure/Cardiology II Service  Pager [phone]  12/1/2021    Patient seen and discussed with Dr. Stein.

## 2021-12-02 NOTE — CONSULTS
CARDIOTHORACIC SURGERY CONSULT NOTE  12/2/2021      Reason for Consult: LVAD evaluation      ASSESSMENT/PLAN: Kavon Banerjee is a 56 year old male with history of chronic systolic heart failure 2/2 NICM (LVEF < 10%), polysubstance abuse (EtOH, meth), newly diagnosed T2DM, chronic gout, and anxiety/depression who presents with worsening abdominal pain since most recent admission. Previous hospitalization 11/1 - 11/12 found to be in cardiogenic shock as [facility] and [facility]. He was discharged with inotrope dependency (milrinone 200mcg/mL) and resolved symtpoms. His decline was attributed to inotrope wean, non-optimized medical therapy, and nonadherence to diet and fluid restrictions. CVTS was consulted for LVAD consideration.    Cardiac catheterization:   Severly reduced cardiac output.   Right sided filling pressures are normal.   Mild elevated pulmonary hypertension.   Left sided filling pressures are moderately elevated        - Agree with LVAD work-up, will discuss case at weekly heart failure conference  - Will discuss Surgical options with Surgeon  - Other cares per primary team  - Thank you for the opportunity to participate in the care of this patient.    Patient and plan discussed with attending, Dr. Corado.      Paula Bonilla PA-C  Cardiothoracic Surgery  December 2, 2021 3:01 PM   p: [phone]       ________________________________________________________________________________________________    HPI: Kavon Banerjee is a 56 year old male with history of chronic systolic heart failure 2/2 NICM (LVEF < 10%), polysubstance abuse (EtOH, meth), newly diagnosed T2DM, chronic gout, and anxiety/depression who presents with worsening abdominal pain since most recent admission. Previous hospitalization 11/1 - 11/12 found to be in cardiogenic shock as [facility] and [facility]. He was discharged with inotrope dependency (milrinone 200mcg/mL) and resolved symtpoms. His decline was attributed to inotrope wean, non-optimized medical  therapy, and nonadherence to diet and fluid restrictions. He currently endorses SOB with activity, even brushing his teeth, speaking, or adjusting himself in bed. He has significant fatigue which is better now then prior to admission. CVTS was consulted for LVAD consideration.    PMH:  No past medical history on file.    PSH:  Past Surgical History:   Procedure Laterality Date     CV RIGHT HEART CATH MEASUREMENTS RECORDED N/A 11/04/2021    Procedure: Heart Cath Right Heart Cath;  Surgeon: Rome Franklin MD;  Location:  HEART CARDIAC CATH LAB     PICC SINGLE LUMEN PLACEMENT Right 11/09/2021    44cm (1cm external), Basilic vein       FH:  Family History   Problem Relation Age of Onset     Hypertension Mother      Hypertension Father      Prostate Cancer Father      Cancer Father      Substance Abuse Father      Hypertension Brother        SH:  Social History     Socioeconomic History     Marital status:      Spouse name: Not on file     Number of children: Not on file     Years of education: Not on file     Highest education level: Not on file   Occupational History     Not on file   Tobacco Use     Smoking status: Never Smoker     Smokeless tobacco: Never Used   Substance and Sexual Activity     Alcohol use: Not Currently     Comment: used to be a heavy drinker up until 50     Drug use: Not on file     Sexual activity: Not on file   Other Topics Concern     Not on file   Social History Narrative     Not on file     Social Determinants of Health     Financial Resource Strain: Not on file   Food Insecurity: Not on file   Transportation Needs: Not on file   Physical Activity: Not on file   Stress: Not on file   Social Connections: Not on file   Intimate Partner Violence: Not on file   Housing Stability: Not on file       Home Meds:  Medications Prior to Admission   Medication Sig Dispense Refill Last Dose     acetaminophen (TYLENOL) 325 MG tablet Take 2 tablets (650 mg) by mouth every 4 hours as needed for mild  pain        allopurinol (ZYLOPRIM) 100 MG tablet Take 1 tablet (100 mg) by mouth daily 90 tablet 1      alum & mag hydroxide-simethicone (MAALOX) 200-200-20 MG/5ML SUSP suspension Take 30 mLs by mouth every 4 hours as needed for indigestion 355 mL 0      colchicine (COLCYRS) 0.6 MG tablet Take 1 tablet (0.6 mg) by mouth daily 90 tablet 1      escitalopram (LEXAPRO) 10 MG tablet Take 1 tablet (10 mg) by mouth At Bedtime 30 tablet 1      magnesium oxide (MAG-OX) 400 MG tablet Take 1 tablet (400 mg) by mouth daily 30 tablet 1      milrinone (PRIMACOR) infusion 200 mcg/mL PREMIX Inject 22.275 mcg/min into the vein continuous        polyethylene glycol (MIRALAX) 17 GM/Dose powder Take 17 g by mouth 2 times daily as needed        potassium chloride ER (KLOR-CON M) 20 MEQ CR tablet Take 2 tablets (40 mEq) by mouth 2 times daily 120 tablet 1      predniSONE (DELTASONE) 20 MG tablet Take 20 mg by mouth as needed        ramelteon (ROZEREM) 8 MG tablet Take 1 tablet (8 mg) by mouth every evening 30 tablet 0      senna-docusate (SENOKOT-S/PERICOLACE) 8.6-50 MG tablet Take 1 tablet by mouth 2 times daily as needed for constipation        torsemide (DEMADEX) 10 MG tablet Take 6 tablets (60 mg) by mouth every morning AND 4 tablets (40 mg) daily at 2 pm. 600 tablet 1      Allergies:  Allergies   Allergen Reactions     Pneumococcal Polysaccharides Other (See Comments)     ROS:  ROS: 10 point ROS neg other than the symptoms noted above in the HPI.    Physical Exam:  Temp:  [97.5  F (36.4  C)-98.3  F (36.8  C)] 97.9  F (36.6  C)  Pulse:  [] 89  Resp:  [16-18] 16  BP: ()/(70-81) 96/74  SpO2:  [94 %-100 %] 97 %  Gen: NAD, resting comfortably in bed, conversational, but is short of breath while speaking and feels very fatigued during ADLs.  HEENT: normocephalic, atraumatic cranium, EOMI, sclerae anicteric. Oral mucosa pink and moist, no tonsillar edema or erythema, midline trachea, nonpalpable thyroid  Lungs: Crackles heard  at the bases, no wheezing or rhonchi  CV: RRR, S1S2 normal, no murmur. Radial pulses and DP pulses symmetric. No dependent edema.   Abd: Positive normal pitched bowel sounds, overall soft and non distended, slightly tender, but improved, no hepatosplenomegaly, no masses/guarding/rebound tenderness.   Musculoskeletal: grossly intact, strength 5/5 upper and lower extremities.He has significant gout with tophi noted on the right wrist. The nodules are nontender and nonerythematous  Neuro: AOx3, CN II-VII grossly intact, sensation/motor intact in upper and lower extremities  Mental: normal mood and affect, regular rate of speech    Labs:  ABG No lab results found in last 7 days.  CBC  Recent Labs   Lab 12/02/21  1049 12/02/21  1046 12/02/21  0334 12/01/21  0646   WBC  --   --  6.0 6.9   HGB 11.7* 11.8* 12.0* 11.4*   PLT  --   --  248 245     BMP  Recent Labs   Lab 12/02/21  1258 12/02/21  0334 12/01/21  1443 12/01/21  0646    135 132* 137   POTASSIUM 3.6  3.6 3.5 4.5  4.5 4.4   CHLORIDE 104 102 102 100   CO2 24 25 19* 25   BUN 24 24 27 24   CR 1.57* 1.58* 1.62* 1.67*   * 120* 68* 103*     LFT  Recent Labs   Lab 12/02/21  1258 12/02/21  0334 12/01/21  1443 12/01/21  0646   AST 16 22 26 19   ALT 15 17 14 16   ALKPHOS 114 128 129 132   BILITOTAL 1.8* 2.1* 2.3* 2.1*   ALBUMIN 3.0* 3.4 3.4 3.4     Pancreas  Recent Labs   Lab 12/01/21  1443   LIPASE 94   AMYLASE 23*       Imaging:  Recent Results (from the past 24 hour(s))   Cardiac Catheterization    Narrative      Severly reduced cardiac output.    Right sided filling pressures are normal.    Mild elevated pulmonary hypertension.    Left sided filling pressures are moderately elevated.

## 2021-12-02 NOTE — PROGRESS NOTES
Admitted 12/1 from [facility] for ambulatory cardiogenic shock. PMH chronic systolic heart failure 2/2 NICM (LVEF < 10%), polysubstance abuse (EtOH, meth), newly diagnosed T2DM, chronic gout, and anxiety/depression.     Neuro: A&Ox4.   Cardiac: Afebrile. SR-ST ('s), All other VSS. Echo done today; 5-10% EF   Respiratory: RA, no SOB at rest, DUNN. lung sounds clear.   GI/: Voiding spontaneously. No BM this shift.   Diet/appetite: Tolerating 2g sodium, 2L FR diet. NPO @ midnight for ultrasound tomorrow. Denies nausea.  Activity: Up ad aliya  Pain: Denies, no abdominal pain reported.   Skin: No new deficits noted.   Lines: R SL PICC infusing, R PIV SL.   Drains: none   Replacement: Potassium replaced x1.     -RHC done today. Increased milrinone to 0.5 mcg/kg/min  -1x dose 40mg IV lasix.   -LVAD work-up in process. 0900 meeting with VAD coordinator and pts mother on 12/3.   -EP consulted for consideration of ICD placement this admission.   -palliative consulted pt today.    Pt has been resting comfortably, will continue to monitor and follow plan of care.

## 2021-12-02 NOTE — PLAN OF CARE
Admitted 12/1 for acute on chronic systolic congestive HF with a history of chronic systolic heart failure 2/2 NICM (LVEF < 10%), polysubstance abuse (EtOH, meth), newly diagnosed T2DM, chronic gout, and anxiety/depression who presents with worsening abdominal pain since most recent admission with increased DUNN and SOB. He has not been able to eat for 4-5 days due to abdominal discomfort and has been experiencing nausea but no emesis.    Neuro: AOx4, denies headache, lightheadedness and dizziness at rest. Reports numbness and tingling in BLE baseline per pt.   Resp: Denies SOB at rest, reports mild DUNN when up to the bathroom. LS clear and equal bilaterally, sating >95% on RA.   Cardiac: Denies chest pain, SR/ST, VSS and afebrile.   GI/: Regular diet, NPO since MN for RHC. Reports intermittent nausea and abdominal pain, denies feeling of vomiting. Voids without difficulty, LBM 12/1 morning per pt.   Skin: WDL, dry and flaky, no interventions taken.   Activity: pt up ad aliya, denies dizziness and lightheadedness reports mild DUNN with activity.  Labs: Cr 1.58, K 3.5, AM draw ordered, no replacement required.  LDAs: R single lumen PICC infusing Milrinone 0.375 mcg/kg/min, R PIV SL and WDL.  PRN Dilaudid 0.2 mg IV given 2134 for abdominal pain 7/10. Pt reports relief from all discomfort and slept well.    Pt had H. Pylori Breath Test done and sent to lab. Pt has been NPO prepping for RHC procedure today. Will continue to monitor and report changes to team

## 2021-12-02 NOTE — PROGRESS NOTES
"CLINICAL NUTRITION SERVICES    Reason for Assessment:  Low-sodium (2 g/day) nutrition education, received consult    Diet History:  Per RD note 11/2021: \"Pt reports history of receiving low-sodium nutrition education in the past. Reports he follows a low sodium diet at home and him and his mom are aware of content of foods and foods to stay away from. Provided verbal instruction on low-sodium meal planning. Provided the following handouts: How to Read Nutrition Labels, Low-Sodium Foods and Drinks, Tips for a Low-Sodium Diet, Seasoning Your Foods Without Adding Salt, and Managing Fluid Restriction.\"  Per nutrition note 11/10/21: \"Per conversation with the pt on 11/10, he has received diet education regarding a low sodium diet, but stated he has not been following that diet at home. He is aware that he has a 2 liter fluid restriction. Pt stated that there have been no changes in appetite and that he is still eating as well as he did PTA. He recently (within the past 2 weeks) moved from [place] to [place] and lives with his mother here. Provided further education on low sodium diet regarding salt substitutes and lower sodium food options. He claims to have prior education as well. Provided pt with sodium room service menu.\"    Today (12/2), pt reports history of receiving low-sodium nutrition education in the past. States he has received written nutrition education and he reads labels. Has been on a fluid restriction. Pt was sleepy at time of visit.     Nutrition Diagnosis:  No nutrition education dx.    Nutrition Prescription/Recs:  Resume low-sodium diet when able (post-procedure), and order a fluid restriction.      Interventions:  Nutrition Education: Offered verbal and written nutrition education. Pt declined need for nutrition education as pt states he has received nutrition education and it is \"straight-forward.\" Encouraged pt to continue to follow a low-sodium diet and fluid restriction and ask any " questions, if needed. Gave room service sodium menu to help with the meal ordering process.     Goals:    Pt will verbalize at least five high sodium foods and the importance of avoiding added salt to foods for cooking or seasoning foods.     Follow-up:   Patient to ask any further nutrition-related questions before discharge. In addition, pt may request outpatient RD appointment.     Ene Brody MS, RD, LD, [facility]   6C Pgr: [phone]

## 2021-12-02 NOTE — CONSULTS
"[facility]  Palliative Care Consultation Note    Patient: Kavon Banerjee  Date of Admission:  12/1/2021    Requesting Clinician / Team: Edith Kam CNP/Cardiology  Reason for consult: Goals of care    Recommendations:    We were consulted for general goals of care prior to the consult for LVAD evaluation was placed, and as teaching not yet completed, did not address LVAD preparedness this visit. In general his goals are life-prolonging. He has an estranged 15 year old daughter and he is deeply worried/distressed about her welfare if he should die. He was also clear that his independence is important to him and he would not find it acceptable quality of life if he were dependent on cares in a nursing home setting, for example. We did discuss that if he is not a candidate for more advanced heart therapy, that given his advance heart failure, CPR would likely cause more harm than benefit. I think he would be agreeable to reverting back to no CPR in that situation as he wants measures to prolong life that are \"reasonable\". He actually stated that he felt in the past that a heart transplant should go to someone younger and healthier than him, rather placing a \"good engine\" in a \"jalopy\" like himself.    Hx anxiety/depression: Started on Lexapro 10 mg daily by Psychiatry during prior hospitalizaiton. He feels \"jittery\" after taking it at bedtime. I did switch it to AM dosing to see if this helps. May want to consider having Psychiatry follow up with him this visit.    Provided him with a copy of a HCD. If he remains inpatient, perhaps  could help him complete and notarize this.    Code status: He changed his code status to FULL code when it was explained to him by a provider that he is young, has good renal function, and that goal of resuscitation attempt would be to bring him back short-term in setting of possibility of advance heart therapies. He does not want long " term ventilation or to continue life support if no hope of recovery.     Thank you for the opportunity to participate in the care of this patient and family. Our team: will continue to follow. Will see him again after LVAD teaching completed.    During regular M-F work hours -- if you are not sure who specifically to contact -- please contact us by sending a text page to our team consult pager at [phone].    After regular work hours and on weekends/holidays, you can call our answering service at [phone]. Also, who's on call for us is available in [facility] Smart Web.     Ruth Hewitt PA-C  [facility] [place]  Contact information available via [facility] Paging/Directory    Total time spent was 55 minutes,  >50% of time was spent counseling and/or coordination of care.    Assessments:  Kavon Banerjee is a 56 year old male with history of chronic systolic heart failure 2/2 NICM (LVEF < 10%), polysubstance abuse (EtOH, meth), newly diagnosed T2DM, chronic gout, and anxiety/depression who presents with worsening abdominal pain since most recent admission. Found to be in cardiogenic shock as OSH and [facility] at last admission (11/1). Decline attributed to inotrope wean, not optimized medical therapy, and nonadherence to diet and fluid restrictions.    Today, the patient was seen for:  Chronic systolic HF 2/2 NICM with EF<10%, inotrope dependent  Goals of care      Prognosis, Goals, & Planning:      Functional Status just prior to hospitalization: 3 (Capable of only limited self-care; needs help with ADLs; in bed/chair >50% of waking hours)       Prognosis, Goals, and/or Advance Care Planning were addressed today: Yes        Summary/Comments: Kavon understands that without advance heart failure therapies, his condition is terminal. His goal is to live long enough to try to ensure his daughter (whom he has not had contact with in 2-3 years) will be ok. He himself feels he has had a  "good life and does not worry about death itself. He changed his code status when it was explained to him that he is young, has good kidneys, and that resuscitation would be to bring him back in the setting of potential advance heart therapies. He would not want to be ventilator dependent or \"hooked up to machines\" with no hope of recovery. He stated that independence was very important to him, and he would not find being dependent and needing nursing home level care acceptable quality of life. He wanted only measures \"within reason\" done to prolong his life.      Patient's decision making preferences: with strong input from medical clinicians          Patient has decision-making capacity today for complex decisions: Yes            I have concerns about the patient/family's health literacy today: No           Patient has a completed Health Care Directive: No.       Code status: Full Code      Coping, Meaning, & Spirituality:   Mood, coping, and/or meaning in the context of serious illness were addressed today: Yes  Summary/Comments: Kavon does endorse issues with anxiety/depression. The majority of our visit today was his expressing his distress regarding his daughter, whom he has not had contact with in 2-3 years. His ex-wife is a [employer], and won't answer his calls. His daughter Latonia also will not answer his calls. Both live in [place]. His main worry is that he feels his ex-wife is isolating their daughter--she does not go out with friends, stays at home, does not have a social life. He feels his ex-wife is preventing him from having contact with her because he is not a [employer]. His main worry is that if he dies, who will help his daughter or prepare her to navigate in the world (she is 15). He himself expressed being at peace with the idea of his own death--feels he has lived a good life.    Social:     Living situation: Lives with his mother (age 80) in her [facility]    Key family / " "caregivers: Mother alive. Father decesed. Has a daughter Latonia (age 15) in [place]. Has a brother who lives in [place] with his family. 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 Does endorse use within the past year of marijuana and \"edibles\". Denies prescription drug misuse. Father was alcoholic.    History of Present Illness:  History gathered today from: patient, medical chart, medical team members    Introduced the Palliative Care team model and services we provide including symptom management, assistance navigating chronic illness and goals for treatment, counseling, psychosocial and spiritual support to Kavon. Kavon came to [place] on October 16th, leaving behind in [place] (where he has lived since 1989), his house, his vehicles. He also has an ex-wife and daughter Latonia (15) who live in [place]. 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 He states he had hoped to retire a year later on full pension, but now has a reduced pension. He is grateful for his health insurance though. He also has friends in [place] that he left behind. He came to get treatment for his medical problems, moving in with his mother who owns a townhome in [place]. She is in her 80s, but still works part-time and is very active. He joked that she will outlive him. He has a brother (Chema) who lives with his family in [place]. He has 4 nieces. He was diagnosed with heart failure about 3 years ago, with an EF<20% at the time. He never required hospitalization for heart failure until recently. He feels he has beaten the odds up to now--he has read about heart failure. But he knows that currently his condition is very serious--\"I'm at the point where I might not be around\". He isn't certain if he " "will be a candidate for LVAD or heart transplant. He knows he is getting an ICD tomorrow which is a \"must\" if he wants to live and allow him to move toward heart transplant or LVAD. He states that he has been feeling poorly recently--abdominal pain from sternum downwards that he now understands is related to heart failure. He doesn't get chest pain. He does get very short of breath even with adjusting sheets on his bed. He has to pause while brushing teeth or going down stairs. He states he can't do \"anything\". He has a cataract in left eye causing almost complete loss of vision--he hopes to be able to have surgery for this on 12/15. He also has cataracts affecting vision in right eye. He states it has been hard to fall asleep due to discomfort. He did find relief instantaneously with dilaudid. He endorses some mild nausea, but no vomiting. He does endorse some anxiety/depression and takes lexapro and ramelteon at bedtime (ramelteon only if needed, but lexapro regularly). He doesn't think he has noticed any improvement yet, and does feel \"jittery\" at bedtime and is unsure if this is due to medication or not.     His main worry and source of distress is his situation with his daughter that he describes as \"unbearable\". He is \"scared to death\" he won't be there for her in the future. He does not have nixon that his ex-wife will help launch their daughter. He worries that she is socially isolated and not prepared for life. He has not seen her in at least 2-3 years. He even rented an apartment \"within 600 ft\" of their home to see if he could at least catch a glimpse of his daughter, but never saw her. He worries that his ex-wife is keeping her from him as he is not a L[employer] like she and her family. Neither she nor his daughter will answer his calls. His mother also has not been able to reach his ex-wife. He hopes that perhaps his brother will be able to introduce her to her cousins so that she has some family " support if he should die.     Kavon does not have a HCD. He would want his mother to make his medical decisions. He knows he should have one and wants to have everything clear in a document so that she can make decisions for him. He was too overwhelmed and distressed today to engage much discussion in what quality of life meant to him and what would be important to him, although we did establish that his independence is of paramount importance and he would not have good quality of life if he were dependent on care in a nursing home, for example. We did discuss that previously he was DNR and now FULL code. He changed his code status to FULL code when it was explained to him by a provider that he is young, has good renal function, and that goal of resuscitation attempt would be to bring him back short-term in setting of possibility of advance heart therapies. He does not want long term ventilation or to continue life support if no hope of recovery    Key Palliative Symptom Data:  We are not helping to manage these symptoms currently in this patient.    ROS:  Palliative Symptom Review (0=no symptom/no concern, 1=mild, 2=moderate, 3=severe):      Pain: 2      Fatigue: 2      Nausea: 1      Constipation: 0      Diarrhea: 0      Depressive Symptoms: 1      Anxiety: 2      Drowsiness: 0      Shortness of Breath: 3      Insomnia: 1     Past Medical History:  No past medical history on file.     Past Surgical History:  Past Surgical History:   Procedure Laterality Date     CV RIGHT HEART CATH MEASUREMENTS RECORDED N/A 11/04/2021    Procedure: Heart Cath Right Heart Cath;  Surgeon: Rome Franklin MD;  Location:  HEART CARDIAC CATH LAB     PICC SINGLE LUMEN PLACEMENT Right 11/09/2021    44cm (1cm external), Basilic vein         Family History:  Family History   Problem Relation Age of Onset     Hypertension Mother      Hypertension Father      Prostate Cancer Father      Cancer Father      Substance Abuse Father       Hypertension Brother         Allergies:  Allergies   Allergen Reactions     Pneumococcal Polysaccharides Other (See Comments)        Medications:  I have reviewed this patient's medication profile and medications from this hospitalization.   Noted:  Allopurinol  Colchicine 0.6 mg po daily  Lexapro 10 mg daily  Milrinone infusion  Dilaudid 0.2 mg iv q4h prn--x2 yest, last dose 21:34    Physical Exam:  Vital Signs: Temp: 97.9  F (36.6  C) Temp src: Oral BP: 111/80 Pulse: 85   Resp: 16 SpO2: 94 % O2 Device: None (Room air)    Weight: 197 lbs 1.6 oz  Gen: Lying in bed. Appears comfortable, in NAD.  HEENT: NCAT. Conjunctiva clear. Sclera anicteric.  Resp: On O2 via nasal cannula. No increased work of breathing, but O2 sats do drop during conversation at times.  Msk: no gross deformity  Skin: No jaundice  Ext: warm, well perfused.   Neuro: face symmetric. EOM, vision, hearing grossly intact. Speech fluent. Moves all extremities  Mental status/Psych: alert. Oriented. Asks/answers questions appropriately. Affect is appropriate.      Data reviewed:  Recent imaging reviewed, my comments on pertinents:   Echo 11/1/21:  Interpretation Summary  Severe left ventricular dilation is present. Left ventricular function is  decreased. The ejection fraction is 5-10% (severely reduced). Severe diffuse  hypokinesis is present.  Mild right ventricular dilation is present. Global right ventricular function  is mildly reduced.  Mild mitral and tricuspid insufficiency present.  Pulmonary hypertension is present. Estimated pulmonary artery systolic  pressure is 41 mmHg plus right atrial pressure.  Dilation of the inferior vena cava is present with abnormal respiratory  variation in diameter.  No pericardial effusion is present.    Recent lab data reviewed, my comments on pertinents:   Na 135  K 3.5  BUN 24  Cr 1.58  T bili 2.1  Alk Phos 128  ALT 17  AST 22  WBC 6.0  Hgb 12.0  Plt 248k

## 2021-12-02 NOTE — PRE-PROCEDURE
GENERAL PRE-PROCEDURE:   Procedure:  Right heart catheterization  Date/Time:  12/2/2021 10:18 AM    Written consent obtained?: Yes    Risks and benefits: Risks, benefits and alternatives were discussed    Consent given by:  Patient  Patient states understanding of procedure being performed: Yes    Patient's understanding of procedure matches consent: Yes    Procedure consent matches procedure scheduled: Yes    Appropriately NPO:  Yes  Lungs:  Lungs clear with good breath sounds bilaterally  Heart:  Normal heart sounds and rate  Labs reviewed.     Angelika Harvey PA-C  [facility] Cardiology

## 2021-12-02 NOTE — PROGRESS NOTES
Care Coordinator  D/I: Per chart review, pt is on service with [facility] for IV home Milrinone/PICC.  P: I have informed [facility] carmela Fish. Has a Lifevest. Will follow

## 2021-12-02 NOTE — CONSULTS
Electrophysiology Consultation Note   EP Attending: .   Reason for consultation: consideration for ICD.   Provider requesting consultation: Ms. Kam, [facility] Cardiology II Service.  Date of Service: 12/2/2021      HPI:   Mr. Banerjee is a 56 year old male who has a past medical history significant for NICM LVEF 5-10%, polysubstance abuse, DM2, gout, anxiety, and depression.   He had a history of longstanding NICM thought secondary to substance use (meth and ETOH). He recently moved here from [place]. He had been doing well until 2-3 months ago when he developed progressive HF symptoms. He was recently admitted from 11/1/21-11/12/21 with ambulatory cardiogenic shock. He was discharged on inotrope and lifevest. He had had a prior dobutamine wean and was quickly readmitted. Last admission inotrope wean failed. He was felt not to be an initial advanced therapies candidate given noncompliance and substance abuse issues; however, he is working to become a candidate. He has been sober from ETOH for last couple years. There is inconsistency about when his last meth use, for sure within last year. He is now readmitted on 12/1/21 again with cardiogenic shock. Urine drugs and PEth negative on admission. RHC today showed CI 1.3. His milrinone was increased. Recent echo showed severely reduced LVEF 5-10%, severe LV dilation, mild RV dilation, mild/moderately reduced RV function, mild MR, mild AI, and mild/moderate TR.  SCr 1.58, GFR 49, electrolytes stable. Current cardiac medications include: hydralazine and milrinone.      Past Medical History:   As per HPI  Past Surgical History:   Past Surgical History:   Procedure Laterality Date    CV RIGHT HEART CATH MEASUREMENTS RECORDED N/A 11/04/2021    Procedure: Heart Cath Right Heart Cath;  Surgeon: Rome Franklin MD;  Location:  HEART CARDIAC CATH LAB    PICC SINGLE LUMEN PLACEMENT Right 11/09/2021    44cm (1cm external), Basilic vein     Allergies: Per MAR     Allergies    Allergen Reactions    Pneumococcal Polysaccharides Other (See Comments)     Medications:   Per MAR current outpatient cardiovascular medications include:   Medications Prior to Admission   Medication Sig Dispense Refill Last Dose    acetaminophen (TYLENOL) 325 MG tablet Take 2 tablets (650 mg) by mouth every 4 hours as needed for mild pain       allopurinol (ZYLOPRIM) 100 MG tablet Take 1 tablet (100 mg) by mouth daily 90 tablet 1     alum & mag hydroxide-simethicone (MAALOX) 200-200-20 MG/5ML SUSP suspension Take 30 mLs by mouth every 4 hours as needed for indigestion 355 mL 0     colchicine (COLCYRS) 0.6 MG tablet Take 1 tablet (0.6 mg) by mouth daily 90 tablet 1     escitalopram (LEXAPRO) 10 MG tablet Take 1 tablet (10 mg) by mouth At Bedtime 30 tablet 1     magnesium oxide (MAG-OX) 400 MG tablet Take 1 tablet (400 mg) by mouth daily 30 tablet 1     milrinone (PRIMACOR) infusion 200 mcg/mL PREMIX Inject 22.275 mcg/min into the vein continuous       polyethylene glycol (MIRALAX) 17 GM/Dose powder Take 17 g by mouth 2 times daily as needed       potassium chloride ER (KLOR-CON M) 20 MEQ CR tablet Take 2 tablets (40 mEq) by mouth 2 times daily 120 tablet 1     predniSONE (DELTASONE) 20 MG tablet Take 20 mg by mouth as needed       ramelteon (ROZEREM) 8 MG tablet Take 1 tablet (8 mg) by mouth every evening 30 tablet 0     senna-docusate (SENOKOT-S/PERICOLACE) 8.6-50 MG tablet Take 1 tablet by mouth 2 times daily as needed for constipation       torsemide (DEMADEX) 10 MG tablet Take 6 tablets (60 mg) by mouth every morning AND 4 tablets (40 mg) daily at 2 pm. 600 tablet 1      No current outpatient medications on file.     Current Facility-Administered Medications   Medication Dose Route Frequency    allopurinol  100 mg Oral Daily    colchicine  0.6 mg Oral Daily    escitalopram  10 mg Oral At Bedtime    [START ON 12/3/2021] pantoprazole  40 mg Oral QAM AC     Family History:   Family History   Problem Relation Age  of Onset    Hypertension Mother     Hypertension Father     Prostate Cancer Father     Cancer Father     Substance Abuse Father     Hypertension Brother      Social History:   Social History     Tobacco Use    Smoking status: Never Smoker    Smokeless tobacco: Never Used   Substance Use Topics    Alcohol use: Not Currently     Comment: used to be a heavy drinker up until 50       ROS:   A comprehensive 10 point ROS was negative other than as mentioned in HPI.    Physical Examination:   VITALS: /80   Pulse 85   Temp 97.9  F (36.6  C) (Oral)   Resp 16   Wt 89.4 kg (197 lb 1.6 oz)   SpO2 94%   BMI 26.00 kg/m    GENERAL APPEARANCE: AxO, NAD   HEENT: NCAT, EOMI, MMM. PERRLA.   NECK: Supple.  CHEST: CTAB   CARDIOVASCULAR: S1S2, Reg, No m/r/g.   ABDOMEN: BS+, soft, NT, ND.   EXTREMITIES: No pedal edema. Distal pulses intact.   NEURO: Grossly nonfocal.   PSYCH: Normal affect.  SKIN: Warm and dry.   Data:   Labs:  BMP  Recent Labs   Lab 21  0334 21  1443 21  0646 21  1055    132* 137 138   POTASSIUM 3.5 4.5  4.5 4.4 3.3*   CHLORIDE 102 102 100 100   JESSICA 8.8 9.2 9.2 9.5   CO2 25 19* 25 31   BUN 24 27 24 15   CR 1.58* 1.62* 1.67* 1.31*   * 68* 103* 101*     CBC  Recent Labs   Lab 21  0334 21  0646   WBC 6.0 6.9   RBC 5.05 4.85   HGB 12.0* 11.4*   HCT 39.5* 36.8*   MCV 78 76*   MCH 23.8* 23.5*   MCHC 30.4* 31.0*   RDW 24.8* 24.2*    245     Cholesterol (mg/dL)   Date Value   2021 90   2021 85     Direct Measure HDL (mg/dL)   Date Value   2021 25 (L)   2021 27 (L)     LDL Cholesterol Calculated (mg/dL)   Date Value   2021 53   2021 46     EK/2/21 ECHO:   Interpretation Summary  The left ventricular ejection fraction is 5-10% (severely reduced). Severe  left ventricular dilation (LVIDd 8.4 cm).  Mild right ventricular dilation. Normal RV TAPSE and tissue doppler, however  visually RV function appears mildly to  moderately reduced.  Mild MR and AI.  Mild to moderate TR.  Right ventricular systolic pressure is 34mmHg above the right atrial pressure.  IVC diameter >2.1 cm collapsing <50% with sniff suggests a high RA pressure  estimated at 15 mmHg or greater.  No pericardial effusion is present.     This study was compared with the study from 11/2/2021. No significant changes  noted.  Assessment:   Mr. Banerjee is a 56 year old male who has a past medical history significant for NICM LVEF 5-10%, polysubstance abuse, DM2, gout, anxiety, and depression.   He had a history of longstanding NICM thought secondary to substance use (meth and ETOH). He recently moved here from [place]. He had been doing well until 2-3 months ago when he developed progressive HF symptoms. He was recently admitted from 11/1/21-11/12/21 with ambulatory cardiogenic shock. He was discharged on inotrope and lifevest. He had had a prior dobutamine wean and was quickly readmitted. Last admission inotrope wean failed. He was felt not to be an initial advanced therapies candidate given noncompliance and substance abuse issues; however, he is working to become a candidate. He has been sober from ETOH for last couple years. There is inconsistency about when his last meth use, for sure within last year. He is now readmitted on 12/1/21 again with cardiogenic shock. Urine drugs and PEth negative on admission. RHC today showed CI 1.3. His milrinone was increased. Recent echo showed severely reduced LVEF 5-10%, severe LV dilation, mild RV dilation, mild/moderately reduced RV function, mild MR, mild AI, and mild/moderate TR.  SCr 1.58, GFR 49, electrolytes stable. Current cardiac medications include: hydralazine and milrinone.   EP Recommendations:  NICM LVEF 5-10%, NYHA IV on home inotrope:  1. ACEi/ARB: on hydralazine for afterload reduction.  2. BB: on hold d/t cardiogenic shock   3. Aldosterone antagonist: deferred due to hypotension.  4. SCD prophylaxis: he had had  indication for ICD as primary prevention for several years. He had previously declined ICD he reports. Now he is inotrope dependent. Unclear candidacy for advanced therapies. Primary prevention ICDs are contraindicated in stage IV heart failure unless patient is candidate for advanced therapies, thus will need clarification from primary team on his status for this. If he is candidate for advanced therapies and they are not eminent, then we would recommend pursing primary prevention ICD. If he is deemed not a candidate for advanced therapies, then likely would need to consider more palliative care route and defer ICD.   5. Fluid status: on IV lasix, diuresis per primary team.     The patient states understanding and is agreeable with plan.   Thank you for allowing us to participate in the care of this patient.     The patient was discussed w/ Dr. Lynn.  The above note reflects our joint plan.    BRITTANY Underwood CNP  Electrophysiology Consult Service  Pager: [phone]    EP STAFF NOTE  I have seen and examined the patient as part of a shared visit with JOB Underwood NP.  I agree with the note above. I reviewed today's vital signs and medications. I have reviewed and discussed with the advanced practice provider their physical examination, assessment, and plan   Briefly, NICM, previous drug use, now with cardiogenic shock, unclear candidacy for LVAD  My key history/exam findings are: ST.   The key management decisions made by me: HF team to make decision for LVAD. If candidate, consider ICD depending on timing.He already has a lifevest.    Octavio Lynn MD [facility]  Cardiology - Electrophysiology

## 2021-12-03 NOTE — CONSULTS
"Pre-LVAD/Transplant Admission Psychosocial Assessment    Patient Name: Kavon Banerjee  : 1965  Age: 56 year old  MRN: [id number]  Date of Initial Social Work Evaluation: 2021    Patient undergoing LVAD/ Heart Transplant. Writer had met w/ pt and his mother in clinic last week for initial psychosocial assessment.  Met with pt and mother again to have ongoing discussions SW role during evaluation, and have ongoing discussions of expectations/requirements, caregiver needs and follow up needs post-LVAD/transplant.     Presenting Information   Living Situation: Pt moved from [place] in October () to live with his mother, in her town home, in [place].   If not local, plans for short term stay:  [place]   Previous Functional Status: Independent with ADLs and IADLs.   Cultural/Language/Spiritual Considerations: none     Support System  Primary Support Person Pt's motherTeresa   Other support:  Brother (Emily), extended family in [facility]  Plan for support in immediate post-LVAD/transplant period: Pt's mother will be the caregiver.     Health Care Directive  Decision Maker: Pt   Alternate Decision Maker: Mahesh SALCIDO, pt's mother is next legal decision maker. Pt is  and next of kin is a minor (14yo dtr)  Health Care Directive: Provided Copy by Palliative Care yesterday-has not yet completed     Mental Health/Coping:   History of Mental Health: Depression and anxiety.   Pt denied inpatient psychiatric hospitalization, but writer did find documentation of pt being hospitalized, in 2018 for SI with plan of \"suicide by \". Writer found this information after writer had met w/ pt in clinic.     History of Chemical Health: Hx of meth and alcohol use more recently. Remote hx of LSD, THC and cocaine. Heavy alcohol use most of his life but quit on his own in 10/2018. Pt has had 3 DUIs with last DUI in . Pt reports that a DUI he received in July of " "this year (2021) is being thrown out because it was related to prescription drug use.       Pt's has been inconsistent in reporting when he last used methamphetamine; told our team, during November admissions that he used the month prior but told Dr. Goode, during same admissions, that he used several months prior to that admission. Recent PEth and drug screens have been negative. Pt has been evaluated by Addiction Medicine with no recommendation for CD evaluation. Plan was for pt to f/u outpatient with Dr. Goode at [facility]. Pt reports that he has not had negative consequences due to drug use. Pt reports that working in the [employer] for 25yrs he was subjected to random drug testing and never tested positive.      Pt reports he has never engaged in CD treatment or a CD evaluation.    Current status: Pt continues to be unclear when he last used methamphetamines. Pt expresses some frustration that team keeps asking him about his CD hx. Pt feels this is insignificant because he uses so infrequently. Had a liudmila conversation that just one time using is a concern in talking about candidacy for advanced therapies.     Readdressed mental health hx today with pt. Pt does acknowledge that he was briefly involuntarily hospitalized for suicidal ideation with plan for \"suicide by \" in June 2018, in [place]. Pt reports he was transported to the psychiatric toro from alf on a psychiatric hold. He reports he spent \"30 minutes\" in the psychiatric toro before he was discharged. After that pt reports he was not required to continue with mental health treatment and never followed up.     Coping: Pt appears to be coping appropriately to hospitalization and the start of the advanced therapy evaluation. Saw LVAD pump this morning and feels he would be able to manage it.     Services Needed/Recommended: Discussed with pt that he should seek out mental health support and pt is agreeable. Writer will connect him to resources. "     Financial   Income: Social Security Disability   Insurance and medication coverage: Yes   Financial concerns: Pt denies having financial concerns.   Resources needed: None at the moment    Education provided by SW: Social Work role during advanced therapy evaluation, availability of virtual support group    Assessment and recommendations and plan:      From a psychosocial perspective, pt is a conditional candidate for advanced therapies. Pt has significant hx of medical noncompliance and leaving AMA since initial heart failure diagnosis in 2019; would want to see better compliance and patient participation in his medical care. Would recommend continuing to do drug screens on patient as pt has reported drug use in past few months. Pt appears motivated to f/u with Dr. Goode, with the Addiction Medicine Team. Pt is agreeable to be connected to mental health support for adjustment to illness and family stressors.     Discussed above concerns with team.

## 2021-12-03 NOTE — PROGRESS NOTES
[facility]   Cardiology II Service / Advanced Heart Failure  Daily Progress Note  Date of Service: 12/2/2021      Patient: Kavon Banerjee  MRN: [id number]  Admission Date: 12/1/2021  Hospital Day # 2    Assessment and Plan:   Kavon Baenrjee is a 56 year old male with history of chronic systolic heart failure 2/2 NICM (LVEF < 10%), polysubstance abuse (EtOH, meth), newly diagnosed T2DM, chronic gout, and anxiety/depression who presents with worsening abdominal pain since most recent admission. Found to be in cardiogenic shock as OSH and [facility] at last admission (11/1). Decline attributed to inotrope wean, not optimized medical therapy, and nonadherence to diet and fluid restrictions.     Today's Plan:  - Bedside [place]-Livan catheter placed today for invasive hemodynamic monitoring  - Arterial line placement  - milrinone to 0.5 pending   - Introduce oral hydralazine and Isordil, wean nitroprusside  - continue diuresis, goal 1 to 2 L negative daily  - Dilaudid prn av  - EP consult for ICD eval: No ICD at this time.        # Chronic Systolic HF 2/2 NICM with EF < 10%  # Abdominal pain 2/2 poor perfusion, resolved  # Hyponatremia, resolved  # Inotrope dependence  Stage D, NYHA Class IV  Patient is on home inotropes as bridge to possible candidacy for advanced therapies. He has not been wearing his LifeVest due to it exacerbating his abdominal pain.  - Lactate 1.1 12/02 (2.1 on admission)  - Venous blood gas: Oxyhgb 31% at admission. Repeat labs proved to be inaccurate - RHC scheduled today  - Lipase/amylase unremarkable  - Troponin unremarkable  - Sodium 132 12/01     - ACEI/ARB/ARNI: discontinued due to low BP  - Afterload reduction: n/a  - BB: deferred due to cardiogenic shock  - Aldosterone antagonist: deferred due to low BP  - SGLT2i: deferred for now  - Inotropy: Milrinone increased to 0.375mcg/kg/min 12/01  - SCD placement: consider ICD this admission  - Fluid status: Lasix 40 mg IV given 12/01  -  Dilaudid injection 0.2 mg IV q4hrs as needed for moderate-severe pain    # Goals of care  - palliative care consulted given prior DNR status and inotrope dependence ?candidate for adv therapies  - EP consult made, will pursue primary prevention ICD if in line with goals of care    # Gout  Continue allopurinol and colchicine plan.     # T2DM  Elevated A1c but has not required insulin. Managed by PCP     # Polysubstance Abuse  Consider chem dep referral. Reports prior use of cocaine, methamphetamine, mushrooms, LSD. Most recent meth use within the last month. Reports having his EtOH use under control but still drinks liquor from time to time socially. He is aware he will need to complete chem dep program in order to be considered for advanced heart therapies in the future.   - Urine drug screen negative 12/1  - PEth negative 12/1  - addiction medicine reconsulted/made aware patient readmitted     # Anxiety/Depression  Continue Lexapro 10 mg daily    # Cataracts  Surgery scheduled for 12/15 at outside hospital.       FEN: NPO for RHC today  PROPHY: regular  DISPO:  Plan to discharge to home pending treatment of cardiogenic shock  CODE STATUS:  Full    Werner Streeter MD, MSc  Cardiovascular Disease Fellow  [facility]    To be discussed with Dr Wilson.     ==========================================    Interval History/ROS: RHC yesterday revealed elevated LV filling pressures and reduced cardiac output/index. SGC removed due to no ICU be availability, later transferred to ICU for further management. Denies chest pain, fever, chills, hematochezia, or melena.    Last 24 hr care team notes reviewed.   ROS:  4 point ROS including Respiratory, CV, GI and , other than that noted in the HPI, is negative.     Medications:     Current Facility-Administered Medications   Medication     - MEDICATION INSTRUCTIONS -     acetaminophen (TYLENOL) tablet 650 mg     allopurinol (ZYLOPRIM) tablet 100 mg      colchicine (COLCYRS) tablet 0.6 mg     escitalopram (LEXAPRO) tablet 10 mg     HOLD: nitroGLYcerin IF     hydrALAZINE (APRESOLINE) half-tab 12.5 mg     HYDROmorphone (DILAUDID) injection 0.2 mg     milrinone (PRIMACOR) infusion 200 mcg/mL PREMIX     naloxone (NARCAN) injection 0.2 mg    Or     naloxone (NARCAN) injection 0.4 mg    Or     naloxone (NARCAN) injection 0.2 mg    Or     naloxone (NARCAN) injection 0.4 mg     nitroPRUsside (NIPRIDE) 50 mg, sodium thiosulfate 500 mg in D5W 125 mL IV infusion (conc: 0.4 mg/mL)     pantoprazole (PROTONIX) EC tablet 40 mg     polyethylene glycol (MIRALAX) powder 17 g     ramelteon (ROZEREM) tablet 8 mg     Reason ACE/ARB/ARNI order not selected     Reason beta blocker not prescribed     simethicone (MYLICON) chewable tablet 80 mg     Physical Exam:   BP (!) 87/48   Pulse 88   Temp (!) 96.1  F (35.6  C) (Axillary)   Resp 18   Wt 89.5 kg (197 lb 5 oz)   SpO2 95%   BMI 26.03 kg/m       GENERAL: Appears alert and oriented times three.   HEENT: Eye symmetrical and free of discharge bilaterally. Mucous membranes moist and without lesions.  NECK: Supple and without lymphadenopathy. JVD to midneck.   CV: RRR, S1S2 present without murmur, rub, or gallop.   RESPIRATORY: Respirations regular, even, and unlabored. Lungs CTA throughout.   GI: Soft and non distended with normoactive bowel sounds present in all quadrants. No tenderness, rebound, guarding. No organomegaly.   EXTREMITIES: No peripheral edema. 2+ bilateral pedal pulses.   NEUROLOGIC: Alert and orientated x 3.   MUSCULOSKELETAL: No joint swelling or tenderness.   SKIN: No jaundice. No rashes or lesions.     Data:  CMP  Recent Labs   Lab 12/03/21  0539 12/03/21  0016 12/02/21  1258 12/02/21  0334 12/01/21  1443     --  136 135 132*   POTASSIUM 3.7  --  3.6  3.6 3.5 4.5  4.5   CHLORIDE 104  --  104 102 102   CO2 24  --  24 25 19*   ANIONGAP 9  --  8 8 11   GLC 87 120* 129* 120* 68*   BUN 23  --  24 24 27   CR 1.52*   --  1.57* 1.58* 1.62*   GFRESTIMATED 51*  --  49* 49* 47*   JESSICA 8.6  --  8.8 8.8 9.2   MAG 2.3  --  2.2  2.3  --  2.5*   PHOS 4.0  --   --   --   --    PROTTOTAL 5.5*  --  6.0* 6.7* 6.9   ALBUMIN 3.0*  --  3.0* 3.4 3.4   BILITOTAL 1.6*  --  1.8* 2.1* 2.3*   ALKPHOS 113  --  114 128 129   AST 16  --  16 22 26   ALT 13  --  15 17 14     CBC  Recent Labs   Lab 12/03/21  0539 12/02/21  1049 12/02/21  1046 12/02/21  0334 12/01/21  0646   WBC 6.0  --   --  6.0 6.9   RBC 4.40  --   --  5.05 4.85   HGB 10.5* 11.7* 11.8* 12.0* 11.4*   HCT 34.2*  --   --  39.5* 36.8*   MCV 78  --   --  78 76*   MCH 23.9*  --   --  23.8* 23.5*   MCHC 30.7*  --   --  30.4* 31.0*   RDW 24.3*  --   --  24.8* 24.2*     --   --  248 245     INR  Recent Labs   Lab 12/03/21  0539   INR 1.42*        RHC 12/2/21  RA: --/--/7  RV:45/10  PA:45/23/34  PCW:--/--/25    PVR 2.6  SVR 1800    Anthony CO/CI: 3.19/1.49  TD CO/CI: 2.7/1.26 Right sided filling pressures are normal. Left sided filling pressures are moderately elevated. Mild elevated pulmonary hypertension. Reduced cardiac output level.        Intake/Output Summary (Last 24 hours) at 12/3/2021 0808  Last data filed at 12/3/2021 0700  Gross per 24 hour   Intake 1965.38 ml   Output 225 ml   Net 1740.38 ml       Net IO Since Admission: 720.08 mL [12/03/21 0808]      I have reviewed today's vital signs, notes, medications, labs and imaging.  I have also seen and examined the patient and agree with the findings and plan as outlined above.  Pt with arterial line placed and RHC placed.  Continues on nipride and milrinone therapy with uptritration of hydralazine and isordil with CI 1.5 and PCW 22.  Pt with cardiogenic shock will continue to increase oral afterload reducing agents and attempt to wean nipride off.  Will need further diuresis.  Pt seen X3 for total critical care time 50 min.     Justice Rivers MD, PhD  Professor, Heart Failure and Cardiac Transplantation  [facility]

## 2021-12-03 NOTE — PLAN OF CARE
Admitted/transferred from: 6C @ 0000  Reason for admission/transfer: Nipride gtt, higher level of care.  Patient status upon admission/transfer: VSS, RA, pleasant.  Interventions: Starting Nipride gtt, NPO @0000.  Plan: ICD in the morning? Nipride gtt  2 RN skin assessment: completed by Julia ONTIVEROS and Bart QUIGLEY  Result of skin assessment and interventions/actions: L shoulder abrasion, healing. Left open to air after cleansing. No acute findings.  Height, weight, drug calc weight: Done  Patient belongings (see Flowsheet - Adult Profile for details): Remain with pt  MDRO education (if applicable): N/A

## 2021-12-03 NOTE — PHARMACY-ADMISSION MEDICATION HISTORY
Admission Medication History Completed by Pharmacy    See [facility] Admission Navigator for allergy information, preferred outpatient pharmacy, prior to admission medications and immunization status.     Medication History Sources:     Patient interview, dispense report, care everywhere    Changes made to PTA medication list (reason):    Added:   o Artificial Tears Ophthalmic Drops      Deleted:   o Miralax 17 G/Dose powder      Changed:   o Allopurinol 100 MG tablet: 100 MG daily --> 300 MG daily    Additional Information:    Takes Prednisone as needed for gout flares, last dose was two weeks ago    Receives constant IV infusion of Milrinone 200 MCG/mL from [facility]    Taking OTC eye drops prior to consult for cataract surgery, originally scheduled for 12/15 per patient.  Was prescribed multiple ophthalmic medications, but has yet to receive them from his pharmacy.      Per patient, only received Miralax in the hospital.  Does not take at home.    Senna/Docusate is on hand, but patient reports never needing to take.    Prior to Admission medications    Medication Sig Last Dose Taking? Auth Provider   acetaminophen (TYLENOL) 325 MG tablet Take 2 tablets (650 mg) by mouth every 4 hours as needed for mild pain Past Month Yes Karla Ragland PA-C   allopurinol (ZYLOPRIM) 100 MG tablet Take 1 tablet (100 mg) by mouth daily  Patient taking differently: Take 300 mg by mouth daily  12/1/2021 Yes Jeevan Foster PA-C   alum & mag hydroxide-simethicone (MAALOX) 200-200-20 MG/5ML SUSP suspension Take 30 mLs by mouth every 4 hours as needed for indigestion Past Week Yes Karla Ragland PA-C   Carboxymethylcellulose Sodium (ARTIFICIAL TEARS OP) Place 1 drop into both eyes 4 times daily 12/1/2021 Yes Unknown, Entered By History   colchicine (COLCYRS) 0.6 MG tablet Take 1 tablet (0.6 mg) by mouth daily 12/1/2021 Yes Jeevan Foster PA-C   escitalopram (LEXAPRO) 10 MG tablet Take 1 tablet (10 mg)  by mouth At Bedtime 12/1/2021 Yes Jeevan Foster PA-C   magnesium oxide (MAG-OX) 400 MG tablet Take 1 tablet (400 mg) by mouth daily 12/1/2021 Yes Karla Ragland PA-C   milrinone (PRIMACOR) infusion 200 mcg/mL PREMIX Inject 22.275 mcg/min into the vein continuous 12/2/2021 Yes Karla Ragland PA-C   potassium chloride ER (KLOR-CON M) 20 MEQ CR tablet Take 2 tablets (40 mEq) by mouth 2 times daily 12/1/2021 Yes Karla Ragland PA-C   predniSONE (DELTASONE) 20 MG tablet Take 20 mg by mouth as needed (Gout flares)  Past Month Yes Reported, Patient   ramelteon (ROZEREM) 8 MG tablet Take 1 tablet (8 mg) by mouth every evening 12/1/2021 Yes Jeevan Foster PA-C   torsemide (DEMADEX) 10 MG tablet Take 6 tablets (60 mg) by mouth every morning AND 4 tablets (40 mg) daily at 2 pm. 12/1/2021 Yes Kimberly Mcguire APRN CNP   senna-docusate (SENOKOT-S/PERICOLACE) 8.6-50 MG tablet Take 1 tablet by mouth 2 times daily as needed for constipation  Patient not taking: Reported on 12/2/2021 Not Taking  Karla Ragland PA-C       Date completed: 12/02/21    Medication history completed by: Binh Perrin

## 2021-12-03 NOTE — PROGRESS NOTES
CLINICAL NUTRITION SERVICES - ASSESSMENT NOTE     Nutrition Prescription    RECOMMENDATIONS FOR MDs/PROVIDERS TO ORDER:  Monitor potential need for folic acid and thiamine supplementation.     Malnutrition Status:    Severe malnutrition in the context of acute illness/injury on chronic illness    Recommendations already ordered by Registered Dietitian (RD):  Multivitamin with minerals  Oral supplements    Future/Additional Recommendations:  1. Once able to restart diet order, rec a 3 g sodium diet if able (liberalized to help encourage oral intake).   2. Continue fluid restriction as per team.   3. Monitor lytes (Phos, Mg++, and K+) for refeeding syndrome. If lytes trend low, aggressively replace. Urinary ketones were negative per urinalysis on 12/2/21.   4. Consider checking folic acid and vitamin B12 labs. If low, rec supplement.    5. Monitor potential need for iron supplementation, if appropriate.   6. Monitor BG control. Hgb A1c of 5.7 on 12/1/21. BG was 87 on 12/3/21.  7. If pt has an LVAD placed and if pt needs TFs, would rec place feeding tube (FT) in radiology and order XR Feeding Tube Placement. Then, initiate TFs, Osmolite 1.5 (maintenance TF formula) and order Prosource TF modular (2 pkts twice daily). Initiate TFs at 10 mL/hr, advancing rate by 10 mL Q 8 hrs (or per provider discretion, pending hemodynamic stability) to goal rate of 70 mL/hr. Osmolite 1.5 Salbador at goal of 70ml/hr (1680ml/day) will provide: 2520+ kcals, 105+ g PRO, 1280 ml free H20, 342 g CHO, and 0 g fiber daily. Each packet of ProSource TF modular provides 40 kcals and 11 g protein.                           If begin tube feeds:                         - Flush FT with 30 mL water Q 4 hrs for patency. Rec provider adjust free water flushes as needed, pending fluid status.                        - Ensure K+/Mg++/Phos labs are ordered daily until TFs advance to goal infusion to evaluate for sx of refeeding with nutrition received. If lytes  "trend low, aggressively replace lytes.                            - If not already ordered, order a multivitamin/mineral (certavite 15 mL/day via FT) to help ensure micronutrient needs being met with suspected hypermetabolic demands and potential interruptions to TF infusions.                        - Monitor TF and possible need to adjust nutrition support regimen if necessary, pending medical course and nutrition status.        REASON FOR ASSESSMENT  Kavon Banerjee is a/an 56 year old male assessed by the dietitian for Provider Order - \"Heart Failure pre Ventricular Assist Device (VAD) planning, Frailty assessment\"    NUTRITION/ADDITIONAL HISTORY  Per nutrition note 11/10/21: \"Per conversation with the pt on 11/10, he has received diet education regarding a low sodium diet, but stated he has not been following that diet at home. He is aware that he has a 2 liter fluid restriction. Pt stated that there have been no changes in appetite and that he is still eating as well as he did PTA. He recently (within the past 2 weeks) moved from [place] to [place] and lives with his mother here. They grocery shop and cook together. Pt states that he enjoys cooking and is looking forward to fitting the 2 g sodium restriction into his cooking to help him feel better. Pt reports weighing 220 lbs 3-4 weeks ago (226 lbs on office visit on 10/5/21), and being down to 198 lbs now. He also reports feeling weaker and lacking muscle in arms and legs since admission. Diet: 2 g Sodium and 2000 mL Fluid Restriction. Intake/Tolerance: Per nursing flowsheets, pt consuming 75 - 100% meals ordered from room service. Full meals being ordered 3x/day per [employer]. Patient states that he is enjoying the food from room service. He has been able to find options on the menu that fit within his restriction and are tasty. He has been trying many different food options during his current admission. Nursing notes indicate that pt is eating well.\" " "See nutrition education note 12/3 for other details.   Per H & P, \"Chronic systolic heart failure 2/2 NICM (LVEF < 10%), polysubstance abuse (EtOH, meth), newly diagnosed T2DM, chronic gout, and anxiety/depression who presents with worsening abdominal pain since most recent admission with increased DUNN and SOB. He has not been able to eat for 4-5 days due to abdominal discomfort and has been experiencing nausea but no emesis. He reports loose stools daily with episodes of hematochezia and is aware he is in need of a colonoscopy. Early satiety. T2DM. Elevated A1c but has not required insulin. Managed by PCP. Reports prior use of cocaine, methamphetamine, mushrooms, LSD. Most recent meth use within the last month.\" Pt was ordered to take, noting, maalox, mag-ox, prednisone, senna, KCl, and demadex PTA.   Pt was busy with tests/procedures during attempts x2 this morning (12/3).     CURRENT NUTRITION ORDERS  Diet: NPO this morning (12/3) for potential tests/procedures including possible ICD. HAs been intermittently NPO this admit for tests/procedures. On a 2 L fluid restriction.   Intake/Tolerance: Tolerating diet. No data available per nursing flowsheets (% intake). [employer] (meal ordering system) indicates food/beverages sent 12/1-12/2 totaled 921 kcals and 28 g protein daily on average.     LABS  Labs reviewed    MEDICATIONS  Medications reviewed  Note, milrinone gtt and nipride gtt.     ANTHROPOMETRICS  Height: 185.4 cm (6' 1\")    Admit wt: 92.5 kg (12/1/21)  Most Recent Weight: 89.5 kg (197 lb 5 oz)    IBW: 83.6 kg. Currently pt is at 107% of IBW.   BMI: Normal BMI. Body mass index is 26.03 kg/m . - Pt is in the ideal range of BMI less than 35 if Tx candidate in the future.   Weight History: 97.5 kg (11/16/20), 97.5 kg (1/9/21), 98.4 kg (4/7/21),103.6 kg (9/28/21), 102.9 kg (10/5/21), 88.5 kg (11/12/21), 93.3 kg (12/1/21) - Pt has lost 14% of body wt over the last two to three months. Suspect actual and fluid " "loss.   Dosing Weight: 89 kg (based on lowest wt so far this admission of 89.4 kg on 12/2)    ASSESSED NUTRITION NEEDS (for inpatient hospital stay)  Estimated Energy Needs: 9531-0189 kcals/day (25 - 30 kcals/kg)  Justification: Maintenance needs, although rec high end of this range  Estimated Protein Needs:  grams protein/day (1.1 - 1.4 grams of pro/kg)  Justification: Increased needs with cardiac status. Estimated needs would increase to 134-178 g protein/day (1.5-2 g protein/kg) if/when LVAD is placed.  Estimated Fluid Needs: 2000 mL/day    Justification: On a fluid restriction      PHYSICAL FINDINGS/OTHER FINDINGS  Regarding frailty, see malnutrition section below.   Resp: No O2  GI: Having zero to one stool daily. Last stool on 12/2. Abdominal discomfort noted on 12/3. Per provider on 12/2, \"he no longer feels any nausea nor abdominal pain.\"    MALNUTRITION  % Intake: < 75% for > 7 days (moderate)  % Weight Loss: > 7.5% in 3 months (severe) - Suspect fluid loss but also suspect actual wt loss as well.  Subcutaneous Fat Loss: Upper arm:  moderate per nutrition note 11/10. Unable to assess 12/3 as pt was busy with tests/procedures during attempts.   Muscle Loss: Thoracic region (clavicle, acromium bone, deltoid, trapezius, pectoral):  moderate and Upper leg (quadricep, hamstring):  mild per nutrition note 11/10. Unable to assess 12/3 as pt was busy with tests/procedures during attempts.   Fluid Accumulation/Edema: None noted  Malnutrition Diagnosis: Severe malnutrition in the context of acute illness/injury on chronic illness    NUTRITION DIAGNOSIS  Inadequate oral intake related to GI sx (improving) and intermittently NPO for tests/procedures as evidenced by [employer] (meal ordering system) indicates food/beverages sent 12/1-12/2 totaled 921 kcals and 28 g protein daily on average while estimated needs are 8259-7101 kcals/day (25 - 30 kcals/kg) and  grams protein/day (1.1 - 1.4 grams of pro/kg).  "     INTERVENTIONS  Implementation  Nutrition education for nutrition relationship to health/disease: Unable to fully assess LVAD candidacy from a nutrition standpoint as pt was busy during attempts.    Medical food supplement therapy: Ordered Ensure Enlive at 10:00 and 14:00 (to be sent once diet advances)  Multivitamin/mineral supplement therapy: Ordered a multivitamin with minerals to help meet micronutrient needs, heart failure.    Goals  Patient to consume % of nutritionally adequate meal trays TID, or the equivalent with supplements/snacks.     Monitoring/Evaluation  Progress toward goals will be monitored and evaluated per protocol.          Nutrition will continue to follow.      Ene Brody, MS, RD, LD, [facility]   6C Pgr: [phone]

## 2021-12-03 NOTE — PROGRESS NOTES
Inpatient Procedure Note    Procedure: Ultrasound guided central access, PA catheter placement, central line placement  Indication: Hemodynamic monitoring, central access  Diagnosis: Cardiogenic shock  First Assist: Lorena Green MD     After informing the benefits and risks, an informed consent was obtained. A time out and site verification were done prior to initiation of the procedure. The neck was prepped and draped in the usual sterile fashion and infiltrated with lidocaine under ultrasound guidance. A 9 Fr MAC sheath was placed in the right internal jugular vein using modified Seldinger technique ultrasound guidance. A balloon-tipped [place]-Livan catheter was advanced into the right atrium, right ventricle, pulmonary artery and pulmonary capillary wedge position with guidance of pressure waveform. The balloon was deflated and the [place]-Livan catheter was left in the pulmonary artery. RA/RV/PA/PCW pressures were obtained. [place] locked at 65 cm. At time of procedure completion, all the ports aspirated and flushed properly. The patient tolerated the procedure well without any immediate complications.     Fluoroscopy at beside showed tip of the catheter within the PA.    Complications: None   Blood loss: Minimal blood loss    BP 95/65/68 mmHg  RA 6 RV 48/6 PA 48/26/36 PCW 21 mmHg  Anthony CO pending, CI pending   PA sat pending     Dr. Justice Rivers was present for jay portions of the procedure.  Dr. Streeter and Peter were present for the whole procedure.    Werner Streeter MD, Msc  Cardiovascular Disease Fellow  [facility]        I have reviewed today's vital signs, notes, medications, labs and imaging.  I have also seen and examined the patient and agree with the findings and plan as outlined above.  PA Catheter placed without complication.  Pt remains in cardiogenic shock.     Justice Rivers MD, PhD  Professor, Heart Failure and Cardiac Transplantation  [facility]

## 2021-12-03 NOTE — CONSULTS
Writer acknowledging CM consult. See previous note by writer. Writer acknowledging Advanced Directive consult. Pt did not have HCD ready to be completed today and too overwhelmed with advanced therapy workup to discuss further. Pt is motivated to complete HCD and writer will continue to work on this with him as part of the advanced therapy evaluation.

## 2021-12-03 NOTE — PROGRESS NOTES
"Met with patient and mother Teresa to present the HM3 as a possible treatment option.     Discussed patient and caregiver responsibilities before and after VAD implant. Clarified that, r/t COVID-19 visitor restrictions, only 2 caregivers may be trained. Clarified the need for a caregiver to be present for education and to assist patient for at least first 30 days after a patient returns home. Patient's designated caregiver is Teresa.     Discussed basic overview of VAD equipment, on going management, need for anticoagulation, regular dressing change, grounded three-pronged outlet and functioning telephone. Also discussed frequency of follow-up clinic appointments and the need to stay locally for at least 4 weeks.  Reviewed restrictions of having a VAD such as no swimming, bathing, boats, MRI's, or arc welding.     Provided and discussed the VAD educational brochure, information regarding the VAD and transplant support groups, and \"VAD What You Should Know\" with additional details. Patient and Teresa signed \"VAD What You Should Know\" document (or agreed to have VAD Coordinator sign on their behalf). VAD coordinator contact information provided.  Encouraged patient and Teresa to call with questions. Learners verbalized understanding of the above information.    Kavon has not been to a dentist since 2001 and knows he needs dental work. I encouraged him to see a dentist and get the required dental work done as soon as possible.    He has not had a Colonoscopy ever. His [facility] PCP referred him. I told Kavon to schedule it for as soon as possible as an outpatient.  "

## 2021-12-03 NOTE — PLAN OF CARE
ICU End of Shift Summary. See flowsheets for vital signs and detailed assessment.    Changes this shift: A&Ox4, anxious at times about plan of care. SBA in room, steady gait. Afebrile. SR-ST 's with BBB. Milrinone running at 0.5mcg/kg/min, Nitroprusside started, titrated to MAP<80, currently at 0.75mcg/kg/min. Cuff pressures labile with any movement. Sats mid-90's on RA, occasionally drops sats to high 80's while asleep with short periods of apnea, recovers quickly. Dyspnea on exertion reported. No BM this shift. NPO at midnight except for meds and small amount of ice chips per MD. Consult placed for advanced care planning, pt expressing a desire to create advance care plan document and requesting more information. K replacement ordered per protocol.     Plan:  Titrate Nitroprusside to maintain MAP<80. Potentially ICD today? Continue to monitor respiratory and cardiac status. LVAD pre-education @0900 today, mom coming for meeting.

## 2021-12-03 NOTE — PROGRESS NOTES
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 \"dabbling\" in recreational drugs (rare marijuana usage) - not frequent enough to recall exact number of times. Thinks he may have used methamphetamine or cocaine once within the past year.     Pt VSS, transferred to  on transport monitoring via wheelchair with resource float nurse. Report given to  nurse, personal belongings taken with patient.   "

## 2021-12-04 NOTE — PLAN OF CARE
Neuro: Remains A/O x 4 , with complaint of headache with relief from scheduled Tylenol.  Cardiac: Nitrupruside off at 1335H,  Maintained on Milrinone at 0.5mcg/kg/min ,Started on Hydralazine and Isordil  ( MAP goal met: 60-70's). Anthony numbers done 2X (see flow sheet)  Respiratory: Comfortable on room air,no SOB even with activities, worked with PT/ up on chair for 2.5 hours.   GI: Fair appetite, good oral intake, no BM today.  : On Lasix 80mg , given once, goal I/O met: (-2L) at 1800H.   Skin :Intact, all lines (Rt. A-line, Rt. Jugular Felch Livan, Rt. Arm PIV) intact.

## 2021-12-04 NOTE — TELEPHONE ENCOUNTER
NOTES Status Details   OFFICE NOTE from referring provider Internal 11.01.2021 Karla Ragland PA-C   OFFICE NOTE from other specialist Internal            Care Everywhere 11.17.2021 Chastity Goldman NP        10.05.2021 Gurmeet Collazo MD     DISCHARGE SUMMARY from hospital Internal 11.01.2021 Karla Ragland PA-C   DISCHARGE REPORT from the ER N/A    MEDICATION LIST Care Everywhere  /Internal    LABS (Any and all labs)      Care Everywhere  /Internal    Biopsy/pathology (Anything related to diagnoses I.e. fluid aspirations, lip biopsy, muscle biopsy)      N/A     N/A    Imaging (All imaging related to diagnoses)     Echo N/A    HRCT N/A    CXR N/A    EMG N/A                   Scleroderma/Dermatomyositis diagnoses     Previous Cardiology notes  N/A    Previous Pulmonary notes N/A    Previous Dermatology notes N/A    Previous GI notes N/A    Lupus diagnoses     Previous Nephrology notes N/A    Previous Dermatology notes N/A    Previous Cardiology notes N/A

## 2021-12-04 NOTE — PROGRESS NOTES
[facility]   Cardiology II Service / Advanced Heart Failure  Daily Progress Note  Date of Service: 12/04/2021      Patient: Kavon Banerjee  MRN: [id number]  Admission Date: 12/1/2021  Hospital Day # 3    Assessment and Plan:   Kavon Banerjee is a 56 year old male with history of chronic systolic heart failure 2/2 NICM (LVEF < 10%), polysubstance abuse (EtOH, meth), newly diagnosed T2DM, chronic gout, and anxiety/depression who presents with worsening abdominal pain since most recent admission. Found to be in cardiogenic shock as OSH and [facility] at last admission (11/1). Decline attributed to inotrope wean, not optimized medical therapy, and nonadherence to diet and fluid restrictions. Patient in need of tailored therapy, ongoing LVAD workup with the expectation that patient will abstain from substances of abuse for at least 6 months.      Today's Plan:  - Introduce oral hydralazine and Isordil, wean nitroprusside off  - milrinone to 0.5   - continue diuresis, goal 1 to 2 L negative daily  - change dilaudid to ultram prn + standing 1000 mg tylenol Q8H  - Q6H Hemodynamics and MVO2  - Goal MAP 60-80 (ideally 60-70)       # Chronic Systolic HF 2/2 NICM with EF < 10%  # Abdominal pain 2/2 poor perfusion, resolved  # Hyponatremia, resolved  # Inotrope dependence  Stage D, NYHA Class IV  Patient is on home inotropes as bridge to possible candidacy for advanced therapies. He has not been wearing his LifeVest due to it exacerbating his abdominal pain.  - Lactate 1.1 12/04 (2.1 on admission)  - Venous blood gas: Oxyhgb 31% at admission.   - Sodium 132 12/04  - ACEI/ARB/ARNI: discontinued due to low BP  - Afterload reduction: n/a  - BB: deferred due to cardiogenic shock  - Aldosterone antagonist: deferred due to low BP  - SGLT2i: deferred for now  - Inotropy: Milrinone increased to 0.375mcg/kg/min 12/01; now 0.5  - SCD placement: consider ICD at later time (discussed with EP)  - Fluid status: Lasix 40 mg IV given  12/01  - Dilaudid injection 0.2 mg IV q4hrs as needed for moderate-severe pain    # Goals of care  - palliative care consulted given prior DNR status and inotrope dependence ?candidate for adv therapies  - EP consult made, no plan for primary prevntion ICD until after LVAD; if no LVAD, will be hospice and primary prevention ICD will not be in line with goals of care    # Gout  Continue allopurinol and colchicine plan.     # T2DM  Elevated A1c      # Polysubstance Abuse  Consider chem dep referral. Reports prior use of cocaine, methamphetamine, mushrooms, LSD. Most recent meth use within the last month. Reports having his EtOH use under control but still drinks liquor from time to time socially. He is aware he will need to complete chem dep program in order to be considered for advanced heart therapies in the future.   - Urine drug screen negative 12/1  - PEth negative 12/1  - addiction medicine reconsulted/made aware patient readmitted     # Anxiety/Depression  Continue Lexapro 10 mg daily    # Cataracts  Surgery scheduled for 12/15 at outside hospital.       FEN: Cardiac diet   PROPHY: regular  DISPO:  ICU for cardiogenic shock  CODE STATUS:  Full    Werner Streeter MD, MSc  Cardiovascular Disease Fellow  [facility]    Discussed with Dr Wilson.     ==========================================    Interval History/ROS: RHC yesterday revealed elevated LV filling pressures and reduced cardiac output/index. SGC removed due to no ICU be availability, later transferred to ICU for further management. Denies chest pain, fever, chills, hematochezia, or melena.    Last 24 hr care team notes reviewed.   ROS:  4 point ROS including Respiratory, CV, GI and , other than that noted in the HPI, is negative.     Medications:     Current Facility-Administered Medications   Medication     - MEDICATION INSTRUCTIONS -     acetaminophen (TYLENOL) tablet 975 mg     allopurinol (ZYLOPRIM) tablet 100 mg      colchicine (COLCYRS) tablet 0.6 mg     escitalopram (LEXAPRO) tablet 10 mg     hydrALAZINE (APRESOLINE) tablet 50 mg     isosorbide dinitrate (ISORDIL) tablet 20 mg     milrinone (PRIMACOR) infusion 200 mcg/mL PREMIX     multivitamin w/minerals (THERA-VIT-M) tablet 1 tablet     naloxone (NARCAN) injection 0.2 mg    Or     naloxone (NARCAN) injection 0.4 mg    Or     naloxone (NARCAN) injection 0.2 mg    Or     naloxone (NARCAN) injection 0.4 mg     pantoprazole (PROTONIX) EC tablet 40 mg     polyethylene glycol (MIRALAX) powder 17 g     ramelteon (ROZEREM) tablet 8 mg     Reason ACE/ARB/ARNI order not selected     Reason beta blocker not prescribed     simethicone (MYLICON) chewable tablet 80 mg     traMADol (ULTRAM) half-tab 50 mg     Physical Exam:   BP (!) 85/54   Pulse 96   Temp 97.3  F (36.3  C) (Oral)   Resp 9   Wt 89.5 kg (197 lb 5 oz)   SpO2 95%   BMI 26.03 kg/m       GENERAL: Appears alert and oriented times three.   HEENT: Eye symmetrical and free of discharge bilaterally. Mucous membranes moist and without lesions.  NECK: Supple and without lymphadenopathy. JVD to midneck.   CV: RRR, S1S2 present without murmur, rub, or gallop.   RESPIRATORY: Respirations regular, even, and unlabored. Lungs CTA throughout.   GI: Soft and non distended with normoactive bowel sounds present in all quadrants. No tenderness, rebound, guarding. No organomegaly.   EXTREMITIES: No peripheral edema. 2+ bilateral pedal pulses.   NEUROLOGIC: Alert and orientated x 3.   MUSCULOSKELETAL: No joint swelling or tenderness.   SKIN: No jaundice. No rashes or lesions.     Data:  CMP  Recent Labs   Lab 12/04/21  1154 12/04/21  0800 12/04/21  0440 12/03/21  2151 12/03/21  1616 12/03/21  0539 12/03/21  0016 12/02/21  1258   NA  --   --  139  --  137 137  --  136   POTASSIUM  --   --  3.6 3.2* 3.1*  3.1* 3.7  --  3.6  3.6   CHLORIDE  --   --  106  --  105 104  --  104   CO2  --   --  24  --  23 24  --  24   ANIONGAP  --   --  9  --  9  9  --  8   * 134* 154*  --  111* 87   < > 129*   BUN  --   --  16  --  19 23  --  24   CR  --   --  1.25  --  1.31* 1.52*  --  1.57*   GFRESTIMATED  --   --  65  --  61 51*  --  49*   JESSICA  --   --  8.6  --  8.7 8.6  --  8.8   MAG  --   --  2.2  --  2.2 2.3  --  2.2  2.3   PHOS  --   --   --   --   --  4.0  --   --    PROTTOTAL  --   --  5.8*  --  6.5* 5.5*  --  6.0*   ALBUMIN  --   --  2.9*  --  3.2* 3.0*  --  3.0*   BILITOTAL  --   --  1.2  --  1.9* 1.6*  --  1.8*   ALKPHOS  --   --  116  --  119 113  --  114   AST  --   --  16  --  19 16  --  16   ALT  --   --  13  --  14 13  --  15    < > = values in this interval not displayed.     CBC  Recent Labs   Lab 12/04/21  0440 12/03/21  0539 12/02/21  1049 12/02/21  1046 12/02/21  0334 12/01/21  0646   WBC 5.1 6.0  --   --  6.0 6.9   RBC 4.42 4.40  --   --  5.05 4.85   HGB 10.5* 10.5* 11.7* 11.8* 12.0* 11.4*   HCT 35.0* 34.2*  --   --  39.5* 36.8*   MCV 79 78  --   --  78 76*   MCH 23.8* 23.9*  --   --  23.8* 23.5*   MCHC 30.0* 30.7*  --   --  30.4* 31.0*   RDW 24.0* 24.3*  --   --  24.8* 24.2*    210  --   --  248 245     INR  Recent Labs   Lab 12/03/21  0539   INR 1.42*        RHC 12/2/21  RA: --/--/7  RV:45/10  PA:45/23/34  PCW:--/--/25    PVR 2.6  SVR 1800    Anthony CO/CI: 3.19/1.49  TD CO/CI: 2.7/1.26 Right sided filling pressures are normal. Left sided filling pressures are moderately elevated. Mild elevated pulmonary hypertension. Reduced cardiac output level.      Intake/Output Summary (Last 24 hours) at 12/4/2021 1528  Last data filed at 12/4/2021 1400  Gross per 24 hour   Intake 2043.18 ml   Output 3905 ml   Net -1861.82 ml     Net IO Since Admission: -4,299.51 mL [12/04/21 1528]    I have reviewed today's vital signs, notes, medications, labs and imaging.  I have also seen and examined the patient and agree with the findings and plan as outlined above.  Pt with no complaints.  VSS with CI 1.5 on milrinone 0.5 mcg/kg/min and unchanged Cr.  Plan  includes increasing oral afterload reducing agents and diuresis in pt with cardiogenic shock and continue to work up for LVAD placement.     Total critical care time 40 min.     Justice Rivers MD, PhD  Professor, Heart Failure and Cardiac Transplantation  [facility]

## 2021-12-04 NOTE — PLAN OF CARE
ICU End of Shift Summary. See flowsheets for vital signs and detailed assessment.    Changes this shift:  Neuro intact. HR/BP stable on milrinone and nipride gtts. [place] and art line at bedside today. Short run of SVT 200s, converted to SR with vagal maneuver. ONOFRE done at 1600- see Flowsheets. Patient remains on room air, no SOB, no significant edema. 80 lasix x1- great response. K replaced x1. Good appetite this evening, no BM.     Plan:  Continue plan of care.

## 2021-12-04 NOTE — PROCEDURES
PROCEDURE:   Ultrasound guided right radial artery line placement.     INDICATION: Blood pressure monitoring    PROCEDURE :   Lorena Kirby MD    SUPERVISOR:  Werner Streeter MD    CONSENT: Obtained and in the paper chart    UNIVERSAL PROTOCOL: Patient Identification was verified, time out was performed, site marking was done. Imaging data reviewed. Full Barrier precaution done: Hands washed, mask, gloves, gown, cap and eye protection all used.     PROCEDURE SUMMARY:   An Yoandy's test showed good flow through both the radial and ulnar arteries.  The patient was prepped and draped in the usual sterile manner using chlorhexidine scrub. 1% lidocaine was used to numb the region. The right radial artery was palpated and visualized on ultrasound.  The artery was successfully cannulated on the first pass. Pulsatile, arterial blood was visualized and the artery was then threaded with a guide wire using the Seldinger technique.  The needle was removed and ultrasound visualized the wire in the artery, a catheter was threaded over the wire, the wire was removed, and the catheter was sutured into place. A good wave-form was obtained. The patient tolerated the procedure well without any immediate complications. The area was cleaned and a dressing was applied.     Sarah Streeter and Silvestre were present for the key portions of the procedure.    ESTIMATED BLOOD LOSS: 5mL    Lorena Schaffer MD  PGY-2 internal medicine  [phone]      I have reviewed today's vital signs, notes, medications, labs and imaging.  I have also seen and examined the patient and agree with the findings and plan as outlined above.    Justice Rivers MD, PhD  Professor, Heart Failure and Cardiac Transplantation  [facility]

## 2021-12-04 NOTE — PLAN OF CARE
ICU End of Shift Summary: See flowsheets for vital signs and detailed assessment    Changes this shift: A&Ox4. Denied pain overnight. Continues on nitroprusside & milrinone gtts- MAPs high 70s. ONOFRE calculations done q6h. No BM overnight. Voiding spontaneously & adequately. K replaced overnight- recheck 3.6- give additional AM dose and recheck tomorrow morning.    Plan: Continue close monitoring of hemodynamics. Notify team w/ changes. Continue w/ POC.

## 2021-12-05 NOTE — PROGRESS NOTES
12/04/21 1500   Quick Adds   Type of Visit Initial PT Evaluation   Living Environment   People in home parent(s)   Current Living Arrangements house   Home Accessibility stairs within home   Number of Stairs, Within Home, Primary 10   Stair Railings, Within Home, Primary railing on right side (ascending)   Transportation Anticipated family or friend will provide   Living Environment Comments [place] entrance to [place] level reported today   Self-Care   Usual Activity Tolerance good   Current Activity Tolerance fair   Equipment Currently Used at Home cane, straight   Activity/Exercise/Self-Care Comment Pt states uses cane more for visual deficits L legally blind, R poor vision   Disability/Function   Hearing Difficulty or Deaf no   Concentrating, Remembering or Making Decisions Difficulty no   Difficulty Communicating no   Difficulty Eating/Swallowing no   Walking or Climbing Stairs Difficulty yes   Walking or Climbing Stairs ambulation difficulty, requires equipment   Mobility Management pt uses straight cane w 3 point small foot (hurrycane)   Dressing/Bathing Difficulty no   Toileting issues no   Doing Errands Independently Difficulty (such as shopping) no   Fall history within last six months no   Change in Functional Status Since Onset of Current Illness/Injury yes   General Information   Onset of Illness/Injury or Date of Surgery 12/02/21   Referring Physician Justice Rivers MD   Patient/Family Therapy Goals Statement (PT) return home   Pertinent History of Current Problem (include personal factors and/or comorbidities that impact the POC) 56 year old male with history of chronic systolic heart failure 2/2 NICM (LVEF < 10%), polysubstance abuse (EtOH, meth), newly diagnosed T2DM, chronic gout, and anxiety/depression who presents with worsening abdominal pain since most recent admission. Found to be in cardiogenic shock as OSH and [facility] at last admission (11/1).  Decline attributed to inotrope wean, not optimized medical therapy, and nonadherence to diet and fluid restrictions. Patient in need of tailored therapy, ongoing LVAD workup with the expectation that patient will abstain from substances of abuse for at least 6 months.   Existing Precautions/Restrictions fall  (swan/art lines)   General Observations Pt agreeable to PT   Cognition   Orientation Status (Cognition) oriented x 4   Affect/Mental Status (Cognition) WFL   Follows Commands (Cognition) follows two-step commands;75-90% accuracy;repetition of directions required   Safety Deficit (Cognition) minimal deficit;impulsivity;safety precautions awareness;safety precautions follow-through/compliance   Posture    Posture Forward head position;Protracted shoulders   Range of Motion (ROM)   ROM Comment WFL   Strength   Strength Comments WFL   Bed Mobility   Comment (Bed Mobility) CGA   Transfers   Transfer Safety Comments min A sit<>stand   Gait/Stairs (Locomotion)   Comment (Gait/Stairs) min A balance short distance gait in room   Balance   Balance Comments fair no AD   Clinical Impression   Criteria for Skilled Therapeutic Intervention yes, treatment indicated   PT Diagnosis (PT) impaired functional activity tolerance   Influenced by the following impairments decreased strength, balance   Functional limitations due to impairments decreased I gait, transfers within precs, stairs, exercise   Clinical Presentation Stable/Uncomplicated   Clinical Presentation Rationale clinical judgement   Clinical Decision Making (Complexity) low complexity   Therapy Frequency (PT) 5x/week   Planned Therapy Interventions (PT) balance training;gait training;home exercise program;patient/family education;stair training;strengthening;transfer training;progressive activity/exercise;risk factor education;home program guidelines   Risk & Benefits of therapy have been explained evaluation/treatment results reviewed;care plan/treatment goals  reviewed;risks/benefits reviewed;current/potential barriers reviewed;participants voiced agreement with care plan;participants included;patient   PT Discharge Planning    PT Discharge Recommendation (DC Rec) home with assist   PT Rationale for DC Rec Anticipate pt will progress to be safe to discharge to home with A from Mom as has previously had, pending medical course and LOS.   PT Brief overview of current status  Pt CGA to min A to mobilize in room due to line safety with art/swan   Total Evaluation Time   Total Evaluation Time (Minutes) 7      No

## 2021-12-05 NOTE — PROGRESS NOTES
[facility]   Cardiology II Service / Advanced Heart Failure  Daily Progress Note  Date of Service: 12/05/2021      Patient: Kavon Banerjee  MRN: [id number]  Admission Date: 12/1/2021  Hospital Day # 4    Assessment and Plan:   Kavon Banerjee is a 56 year old male with history of chronic systolic heart failure 2/2 NICM (LVEF < 10%), polysubstance abuse (EtOH, meth), newly diagnosed T2DM, chronic gout, and anxiety/depression who presents with worsening abdominal pain since most recent admission. Found to be in cardiogenic shock as OSH and [facility] at last admission (11/1). Decline attributed to inotrope wean, not optimized medical therapy, and nonadherence to diet and fluid restrictions. Patient in need of tailored therapy, ongoing LVAD workup with the expectation that patient will abstain from substances of abuse for at least 6 months.      Today's Plan:  - Continue oral hydralazine and Isordil  - Start low-dose Entresto  - Wean milrinone to 0.375  - continue diuresis, goal 1 to 2 L negative daily  - Q6H Hemodynamics and MVO2  - Goal MAP 60-80 (ideally 60-70)       # Chronic Systolic HF 2/2 NICM with EF < 10%  # Abdominal pain 2/2 poor perfusion, resolved  # Hyponatremia, resolved  # Inotrope dependence  Stage D, NYHA Class IV  Patient is on home inotropes as bridge to possible candidacy for advanced therapies. He has not been wearing his LifeVest due to it exacerbating his abdominal pain.  - Lactate 1.1 12/04 (2.1 on admission)  - Venous blood gas: Oxyhgb 31% at admission.   - Sodium 138 12/04  - ACEI/ARB/ARNI: Start low dose Entresto  - Afterload reduction: On Isordil/Hydral  - BB: deferred due to cardiogenic shock  - Aldosterone antagonist: deferred due to low BP  - SGLT2i: deferred for now  - Inotropy: Milrinone decresaed to 0.375mcg/kg/min  - SCD placement: consider ICD at later time (discussed with EP)  - Fluid status: Give lasix 100mg IV in AM  - Dilaudid injection 0.2 mg IV q4hrs as needed for  moderate-severe pain    # Goals of care  - palliative care consulted given prior DNR status and inotrope dependence ?candidate for adv therapies  - EP consult made, no plan for primary prevntion ICD until after LVAD; if no LVAD, will be hospice and primary prevention ICD will not be in line with goals of care    # Gout  Continue allopurinol and colchicine plan.     # T2DM  Elevated A1c      # Polysubstance Abuse  Consider chem dep referral. Reports prior use of cocaine, methamphetamine, mushrooms, LSD. Most recent meth use within the last month. Reports having his EtOH use under control but still drinks liquor from time to time socially. He is aware he will need to complete chem dep program in order to be considered for advanced heart therapies in the future.   - Urine drug screen negative 12/1  - PEth negative 12/1  - addiction medicine reconsulted/made aware patient readmitted     # Anxiety/Depression  Continue Lexapro 10 mg daily    # Cataracts  Surgery scheduled for 12/15 at outside hospital.       FEN: Cardiac diet   PROPHY: regular  DISPO:  ICU for cardiogenic shock  CODE STATUS:  Full    Joaquin Gillespie MD  Cardiology Fellow    Discussed with Dr Wilson.     ==========================================    Interval History/ROS:  Reviewed events from last 24 hours.  No acute events overnight.  No new symptoms.    ROS:  4 point ROS including Respiratory, CV, GI and , other than that noted in the HPI, is negative.     Medications:     Current Facility-Administered Medications   Medication     - MEDICATION INSTRUCTIONS -     acetaminophen (TYLENOL) tablet 975 mg     allopurinol (ZYLOPRIM) tablet 100 mg     colchicine (COLCYRS) tablet 0.6 mg     escitalopram (LEXAPRO) tablet 10 mg     hydrALAZINE (APRESOLINE) tablet 50 mg     hydrOXYzine (ATARAX) tablet 25 mg    Or     hydrOXYzine (ATARAX) tablet 50 mg     isosorbide dinitrate (ISORDIL) tablet 20 mg     milrinone (PRIMACOR) infusion 200 mcg/mL PREMIX     multivitamin  w/minerals (THERA-VIT-M) tablet 1 tablet     naloxone (NARCAN) injection 0.2 mg    Or     naloxone (NARCAN) injection 0.4 mg    Or     naloxone (NARCAN) injection 0.2 mg    Or     naloxone (NARCAN) injection 0.4 mg     pantoprazole (PROTONIX) EC tablet 40 mg     polyethylene glycol (MIRALAX) powder 17 g     ramelteon (ROZEREM) tablet 8 mg     Reason ACE/ARB/ARNI order not selected     Reason beta blocker not prescribed     sacubitril-valsartan half-tab 12-13 mg     simethicone (MYLICON) chewable tablet 80 mg     traMADol (ULTRAM) half-tab 50 mg     Physical Exam:   BP (!) 85/54   Pulse 94   Temp 97.3  F (36.3  C) (Axillary)   Resp 18   Wt 89.5 kg (197 lb 5 oz)   SpO2 98%   BMI 26.03 kg/m       GENERAL: Appears alert and oriented times three.   HEENT: Eye symmetrical and free of discharge bilaterally. Mucous membranes moist and without lesions.  NECK: Supple and without lymphadenopathy. JVD to midneck.   CV: RRR, S1S2 present without murmur, rub, or gallop.   RESPIRATORY: Respirations regular, even, and unlabored. Lungs CTA throughout.   GI: Soft and non distended with normoactive bowel sounds present in all quadrants. No tenderness, rebound, guarding. No organomegaly.   EXTREMITIES: No peripheral edema. 2+ bilateral pedal pulses.   NEUROLOGIC: Alert and orientated x 3.   MUSCULOSKELETAL: No joint swelling or tenderness.   SKIN: No jaundice. No rashes or lesions.     Data:  CMP  Recent Labs   Lab 12/05/21  1117 12/05/21  0902 12/05/21  0409 12/04/21  2130 12/04/21  1646 12/04/21  0800 12/04/21  0440 12/03/21  2151 12/03/21  1616 12/03/21  0539   NA  --   --  138  --  138  --  139  --  137 137   POTASSIUM  --   --  3.7 4.0 3.4  --  3.6   < > 3.1*  3.1* 3.7   CHLORIDE  --   --  107  --  108  --  106  --  105 104   CO2  --   --  22  --  23  --  24  --  23 24   ANIONGAP  --   --  9  --  7  --  9  --  9 9   GLC 87 98 122*  --  142*   < > 154*  --  111* 87   BUN  --   --  18  --  16  --  16  --  19 23   CR  --   --   1.23  --  1.15  --  1.25  --  1.31* 1.52*   GFRESTIMATED  --   --  66  --  72  --  65  --  61 51*   JESSICA  --   --  9.1  --  9.0  --  8.6  --  8.7 8.6   MAG  --   --  2.1  --  2.2  --  2.2  --  2.2 2.3   PHOS  --   --   --   --   --   --   --   --   --  4.0   PROTTOTAL  --   --  6.1*  --  7.0  --  5.8*  --  6.5* 5.5*   ALBUMIN  --   --  3.0*  --  3.4  --  2.9*  --  3.2* 3.0*   BILITOTAL  --   --  1.1  --  1.4*  --  1.2  --  1.9* 1.6*   ALKPHOS  --   --  118  --  134  --  116  --  119 113   AST  --   --  16  --  19  --  16  --  19 16   ALT  --   --  13  --  16  --  13  --  14 13    < > = values in this interval not displayed.     CBC  Recent Labs   Lab 12/05/21  0409 12/04/21  0440 12/03/21  0539 12/02/21  1049 12/02/21  1046 12/02/21  0334   WBC 6.3 5.1 6.0  --   --  6.0   RBC 4.58 4.42 4.40  --   --  5.05   HGB 10.8* 10.5* 10.5* 11.7*   < > 12.0*   HCT 35.2* 35.0* 34.2*  --   --  39.5*   MCV 77* 79 78  --   --  78   MCH 23.6* 23.8* 23.9*  --   --  23.8*   MCHC 30.7* 30.0* 30.7*  --   --  30.4*   RDW 24.3* 24.0* 24.3*  --   --  24.8*    196 210  --   --  248    < > = values in this interval not displayed.     INR  Recent Labs   Lab 12/03/21  0539   INR 1.42*        RHC 12/2/21  RA: --/--/7  RV:45/10  PA:45/23/34  PCW:--/--/25    PVR 2.6  SVR 1800    Anthony CO/CI: 3.19/1.49  TD CO/CI: 2.7/1.26 Right sided filling pressures are normal. Left sided filling pressures are moderately elevated. Mild elevated pulmonary hypertension. Reduced cardiac output level.      Intake/Output Summary (Last 24 hours) at 12/4/2021 1528  Last data filed at 12/4/2021 1400  Gross per 24 hour   Intake 2043.18 ml   Output 3905 ml   Net -1861.82 ml     Net IO Since Admission: -4,299.51 mL [12/04/21 1528]    I have reviewed today's vital signs, notes, medications, labs and imaging.  I have also seen and examined the patient and agree with the findings and plan as outlined above. Pt without complaints on milrinone 0.5mcg/kg/min and on  hydralazine 50 mg TID and isordil 20 mg TID with 4.3 l UO and Cr 1.2.  PCW 20 and CI 1.8.  Assessment: Pt with multiple med problems includin) Cardiomyopathy with cardiogenic shock   2) Remote hx of polysubstance abuse   3) CKD  4) Hx of alcohol abuse     Will continue to increase oral afterload reducing agents, continue diuresis and attempt to decrease milrinone. Pt seen X2 for total critical care time 45 min.     Justice Rivers MD, PhD  Professor, Heart Failure and Cardiac Transplantation  [facility]

## 2021-12-05 NOTE — PLAN OF CARE
ICU End of Shift Summary: See flowsheets for vital signs and detailed assessment    Changes this shift: A&Ox4. Anxious overnight- PRN Atarax ordered & given x 1. PRN Tramadol given x 1 for neck pain to relief. Continue w/ q6h ONOFRE calculations. Denies SOB on room air. 100mg IV lasix given x 1 to good UOP. K replaced for 3.7. Continue w/ 2L daily fluid restriction     Plan: Continue close monitoring of hemodynamics. Notify team w/ changes. Continue to monitor & follow POC.       Problem: Fluid Imbalance (Heart Failure)  Goal: Fluid Balance  Outcome: No Change     Problem: Adjustment to Illness (Heart Failure)  Goal: Optimal Coping  Outcome: No Change

## 2021-12-05 NOTE — PLAN OF CARE
4C: OT defer: Pt is not appropriate for skilled OT services at this time due to CGA with mobiltiy. Will defer to PT for mobility & endurance. Plan was discussed with PT. Will D/C current OT orders. Thank you.    12/5/2021 by Kathie Rivera, OT, OTR/L

## 2021-12-06 NOTE — PLAN OF CARE
ICU End of Shift Summary. See flowsheets for vital signs and detailed assessment.    Changes this shift: A&Ox4, able to make needs known. Denies pain. Patient became hypotensive (BP 60's/30's) and experienced vision changes after PO cardiac medications were titrated this afternoon. Visual changes resolved after a 500ml NS bolus was given. Patient was alert and denied other symptoms of hypotension during episode.      Plan: Continue to monitor and notify MD if patients BP drops significantly or if patients is symptomatically hypotensive.       Problem: Adult Inpatient Plan of Care  Goal: Absence of Hospital-Acquired Illness or Injury  Intervention: Identify and Manage Fall Risk  Recent Flowsheet Documentation  Taken 12/5/2021 1600 by Mavis Wilkins, RN  Safety Promotion/Fall Prevention:   activity supervised   fall prevention program maintained

## 2021-12-06 NOTE — TELEPHONE ENCOUNTER
Noticed that patient cancelled. Disregard previous message.      Awaiting response. Is this OK to schedule?

## 2021-12-06 NOTE — PLAN OF CARE
ICU End of Shift Summary: See flowsheets for vital signs and detailed assessment    Changes this shift: A&Ox4. Denied pain, nausea, & SOB overnight. FICKs calculated q6h. Continues on straight rate milrinone gtt. PO hydralazine & Isordil held overnight d/t SBPs below 95. Voiding spontaneously. Slept well overnight. No acute events.     Plan: Titrate cardiac meds as needed & continue to closely monitor hemodynamics. Notify team w/ changes.       Problem: Risk for Delirium  Goal: Optimal Coping  Outcome: No Change  Goal: Improved Behavioral Control  Outcome: No Change  Goal: Improved Attention and Thought Clarity  Outcome: No Change  Goal: Improved Sleep  Outcome: No Change     Problem: Fluid Imbalance (Heart Failure)  Goal: Fluid Balance  Outcome: No Change

## 2021-12-06 NOTE — PROGRESS NOTES
[facility]   Cardiology II Service / Advanced Heart Failure  Daily Progress Note  Date of Service: 12/06/2021      Patient: Kavon Banerjee  MRN: [id number]  Admission Date: 12/1/2021  Hospital Day # 5    Assessment and Plan:   Kavon Banerjee is a 56 year old male with history of chronic systolic heart failure 2/2 NICM (LVEF < 10%), polysubstance abuse (EtOH, meth), newly diagnosed T2DM, chronic gout, and anxiety/depression who presents with worsening abdominal pain since most recent admission. Found to be in cardiogenic shock as [facility] and [facility] at last admission (11/1). Decline attributed to inotrope wean, not optimized medical therapy, and nonadherence to diet and fluid restrictions. Patient in need of tailored therapy, ongoing LVAD workup with the expectation that patient will abstain from substances of abuse for at least 6 months.      Today's Plan:  - Continue oral hydralazine and Isordil  - Wean milrinone to 0.375  - overnight, CI's were input as CO's, resulting in spurious hemodynamic values (elevated SVR, low CI)  - CVP 4 today, maintain euvolemia and encourage oral intake (goal CVP at least 8-10 mmHg)  - Hemodynamics 2-3 hrs after milrinone decrease  - Q6H Hemodynamics and MVO2  - Goal MAP 60-80 (ideally 60-70)  - discuss ICD again with EP  - f/u addiction medicine recommendations  - discontinue a-line        # Chronic Systolic HF 2/2 NICM with EF < 10%  # Abdominal pain 2/2 poor perfusion, resolved  # Hyponatremia, resolved  # Inotrope dependence  Stage D, NYHA Class IV  Patient is on home inotropes as bridge to possible candidacy for advanced therapies. He has not been wearing his LifeVest due to it exacerbating his abdominal pain.  - Lactate 1.1 12/04 (2.1 on admission)  - Venous blood gas: Oxyhgb 31% at admission.   - Sodium 138 12/04  - ACEI/ARB/ARNI: Start low dose Entresto  - Afterload reduction: On Isordil/Hydral  - BB: deferred due to cardiogenic shock  - Aldosterone antagonist: deferred  due to low BP  - SGLT2i: deferred for now  - Inotropy: Milrinone decresaed to 0.375mcg/kg/min  - SCD placement: consider ICD at later time (discussed with EP)  - Fluid status: Give lasix 100mg IV in AM  - Dilaudid injection 0.2 mg IV q4hrs as needed for moderate-severe pain    # Goals of care  - palliative care consulted given prior DNR status and inotrope dependence ?candidate for adv therapies  - EP consult made, no plan for primary prevntion ICD until after LVAD; if no LVAD, will be hospice and primary prevention ICD will not be in line with goals of care    # Gout  Continue allopurinol and colchicine plan.     # T2DM  Elevated A1c      # Polysubstance Abuse  Consider chem dep referral. Reports prior use of cocaine, methamphetamine, mushrooms, LSD. Most recent meth use within the last month. Reports having his EtOH use under control but still drinks liquor from time to time socially. He is aware he will need to complete chem dep program in order to be considered for advanced heart therapies in the future.   - Urine drug screen negative 12/1  - PEth negative 12/1  - addiction medicine reconsulted/made aware patient readmitted     # Anxiety/Depression  Continue Lexapro 10 mg daily    # Cataracts  Surgery scheduled for 12/15 at outside hospital.       FEN: Cardiac diet   PROPHY: regular  DISPO:  ICU for cardiogenic shock  CODE STATUS:  Full    Werner Streeter MD, MSc  Cardiovascular Disease Fellow  [facility]      Discussed with Dr Wilson.     ==========================================    Interval History/ROS:  Reviewed events from last 24 hours.  No acute events overnight.  No new symptoms.  Entresto introduced yesterday, became hypotensive and sympomatic (MAP in 50's)    ROS:  4 point ROS including Respiratory, CV, GI and , other than that noted in the HPI, is negative.     Medications:     Current Facility-Administered Medications   Medication     - MEDICATION INSTRUCTIONS -      acetaminophen (TYLENOL) tablet 975 mg     allopurinol (ZYLOPRIM) tablet 100 mg     colchicine (COLCYRS) tablet 0.6 mg     escitalopram (LEXAPRO) tablet 10 mg     hydrALAZINE (APRESOLINE) tablet 50 mg     hydrOXYzine (ATARAX) tablet 25 mg    Or     hydrOXYzine (ATARAX) tablet 50 mg     influenza recomb quadrivalent PF (FLUBLOK) injection 0.5 mL     isosorbide dinitrate (ISORDIL) tablet 20 mg     milrinone (PRIMACOR) infusion 200 mcg/mL PREMIX     multivitamin w/minerals (THERA-VIT-M) tablet 1 tablet     naloxone (NARCAN) injection 0.2 mg    Or     naloxone (NARCAN) injection 0.4 mg    Or     naloxone (NARCAN) injection 0.2 mg    Or     naloxone (NARCAN) injection 0.4 mg     pantoprazole (PROTONIX) EC tablet 40 mg     polyethylene glycol (MIRALAX) powder 17 g     potassium chloride ER (KLOR-CON M) CR tablet 20 mEq     ramelteon (ROZEREM) tablet 8 mg     Reason ACE/ARB/ARNI order not selected     Reason beta blocker not prescribed     simethicone (MYLICON) chewable tablet 80 mg     traMADol (ULTRAM) half-tab 50 mg     Physical Exam:   BP (!) 72/51   Pulse 101   Temp 97.5  F (36.4  C) (Oral)   Resp 12   Wt 89.5 kg (197 lb 5 oz)   SpO2 98%   BMI 26.03 kg/m       GENERAL: Appears alert and oriented times three.   HEENT: Eye symmetrical and free of discharge bilaterally. Mucous membranes moist and without lesions.  NECK: Supple and without lymphadenopathy. R internal jugular SGC in place..   CV: RRR, S1S2 present without murmur, rub, or gallop. R Radial nelson place   RESPIRATORY: Respirations regular, even, and unlabored. Lungs CTA throughout.   GI: Soft and non distended with normoactive bowel sounds present in all quadrants. No tenderness, rebound, guarding. No organomegaly.   EXTREMITIES: No peripheral edema. 2+ bilateral pedal pulses.   NEUROLOGIC: Alert and orientated x 3.   MUSCULOSKELETAL: No joint swelling or tenderness.   SKIN: No jaundice. No rashes or lesions.     Data:  CMP  Recent Labs   Lab  12/06/21  0537 12/05/21  1706 12/05/21  1117 12/05/21  0902 12/05/21  0409 12/04/21  2130 12/04/21  1646 12/03/21  1616 12/03/21  0539    139  --   --  138  --  138   < > 137   POTASSIUM 3.8 3.6  --   --  3.7 4.0 3.4   < > 3.7   CHLORIDE 108 108  --   --  107  --  108   < > 104   CO2 22 23  --   --  22  --  23   < > 24   ANIONGAP 8 8  --   --  9  --  7   < > 9   GLC 86 116* 87 98 122*  --  142*   < > 87   BUN 17 17  --   --  18  --  16   < > 23   CR 1.22 1.25  --   --  1.23  --  1.15   < > 1.52*   GFRESTIMATED 67 65  --   --  66  --  72   < > 51*   JESSICA 8.8 8.7  --   --  9.1  --  9.0   < > 8.6   MAG 2.1 2.0  --   --  2.1  --  2.2   < > 2.3   PHOS  --   --   --   --   --   --   --   --  4.0   PROTTOTAL 6.2* 6.6*  --   --  6.1*  --  7.0   < > 5.5*   ALBUMIN 2.9* 3.2*  --   --  3.0*  --  3.4   < > 3.0*   BILITOTAL 1.0 1.1  --   --  1.1  --  1.4*   < > 1.6*   ALKPHOS 117 126  --   --  118  --  134   < > 113   AST 17 16  --   --  16  --  19   < > 16   ALT 13 15  --   --  13  --  16   < > 13    < > = values in this interval not displayed.     CBC  Recent Labs   Lab 12/06/21  0537 12/05/21 0409 12/04/21  0440 12/03/21  0539   WBC 6.2 6.3 5.1 6.0   RBC 4.99 4.58 4.42 4.40   HGB 12.0* 10.8* 10.5* 10.5*   HCT 38.0* 35.2* 35.0* 34.2*   MCV 76* 77* 79 78   MCH 24.0* 23.6* 23.8* 23.9*   MCHC 31.6 30.7* 30.0* 30.7*   RDW 24.6* 24.3* 24.0* 24.3*    215 196 210     INR  Recent Labs   Lab 12/03/21  0539   INR 1.42*        RHC 12/2/21  RA: --/--/7  RV:45/10  PA:45/23/34  PCW:--/--/25    PVR 2.6  SVR 1800    Anthony CO/CI: 3.19/1.49  TD CO/CI: 2.7/1.26 Right sided filling pressures are normal. Left sided filling pressures are moderately elevated. Mild elevated pulmonary hypertension. Reduced cardiac output level.        Intake/Output Summary (Last 24 hours) at 12/6/2021 0735  Last data filed at 12/6/2021 0700  Gross per 24 hour   Intake 2940.32 ml   Output 2550 ml   Net 390.32 ml     Net IO Since Admission: -3,126.39 mL  [12/06/21 8914]    I have reviewed today's vital signs, notes, medications, labs and imaging.  I have also seen and examined the patient and agree with the findings and plan as outlined above.  Pt with alcohol and tobacco abuse hx with endstage heart failure on milrinone support unable to tolerate entresto therapy.  Plan is to continue on milrinone 0.375 mcg/kg/min, continue diuresis and hydralazine/isordil.  Will continue LVAD workup. Total critical care time 40 min.     Justice Rivers MD, PhD  Professor, Heart Failure and Cardiac Transplantation  [facility]

## 2021-12-06 NOTE — CONSULTS
[facility]   Consult Note - Addiction Service     Date of Admission:  12/1/2021    Consult Requested by: Cardiology  Reason for Consult: Substance use support    Assessment & Plan   Kavon Banerjee admitted on 2/1/2021. He is a 56 year old male with history of chronic systolic heart failure 2/2 NICM (LVEF < 10%), distant heavy alcohol use, and prior history of intermittent meth use, admitted with decompensated heart failure.    Addiction medicine consulted to provide support to assist him in his goal of long term sobriety    # History of Heavy Alcohol use:  - prior history of heavy alcohol use.  However, has been in remission long term (3+ years), without cravings.  Up until the past few months would socially drink, but no active signs of alcohol use disorder.  Since moving to [place], his goal is complete avoidance of alcohol, and he has been meeting this goal on his own.    - PETH negative.  He is registered at [facility] for long term follow up     Methamphetamine use:  Has intermittently smoked meth in the past, but this is not a drug of choice, and does not have cravings.  Reported using some time ago with some friends to cardiology while being very open about prior substance use during a prior admission, but he tells me he thinks it was several months ago, and prior to moving to [place]. In the past, he occasionally would smoke if around someone else smoking, but is now avoiding being in those situations.  No cravings, social issues, or legal issues from meth use.  Urine drug testing to date also have been negative.    - drug screen and confirmation panel negative on admission  - Currently, no active signs of methamphetamine dependence or of a Methamphetamine Use Disorder.    Overall:  Now that he is in [place], and is living with his supportive mother, he appears to be meeting the recommendations of his medical teams regarding adherence to medications and  appointments.  - At this point, it does not appear that Mr. Banerjee has an active substance use disorder requiring active treatment to improve stability, prior to providing invasive therapies, as he is meeting his sobriety goals on his own.   In addition, now that he is reestablished in [place], he has family support, transportation support, and has been able to meet his medical needs and follow through with recommendations from his cardiology teams regarding appointments, and he has a highly supportive mother.      Linkage to Care:    Kavon is linked to [facility] for long term substance use support.  He also has been linked to primary care at [facility] by cardiology    The patient's care was discussed with the Care Coordinator/, Patient and Primary team.      I spent 120 minutes on the unit/floor managing the care of Kavon Banerjee. Over 50% of my time was spent on the following:   - Counseling the patient and/or family regarding: diagnostic results, prognosis, risks and benefits of treatment options and recommended follow-up  - Coordination of care with the: consultant(s) and care coordinator/    Elias Goode MD  [facility]   Contact information available via [facility] Paging/Directory  Please see sign in/sign out for up to date coverage information    ChAT team (Addiction Consult Team): Coverage Monday-Friday 8-4pm    Provider (Pager)  (Pager)   Mon Dr. Elias Goode [phone]   Tues Dr. Elias Goode [phone]   Wed Dr. Elias Goode [phone]   Thurs Dr. Raymond Mcgee [phone]   Fri Dr. Osei Valdes [phone]   Sat-[facility] Team- Refer to [facility].  For urgent needs, please place a  consult for psychiatry. None     ______________________________________________________________________    Chief Complaint   Substance use support    History is obtained from the patient    History of Present  Charanjit Banerjee is a 55 yo male with a PMHx that includes severe NICM with EF< 10%, and prior polysubstance use, admitted with complication of heart failure.  OUr team met him during his prior hospitalization for heart failure.    At that visit, we discussed his alcohol use history, as well as his history of using other substances.    Alcohol history:  As per our previous conversation, although he had a history of heavy drinking for years, has not had any issues from significant alcohol use for several years.  His goal is sobriety from alcohol, for the most part he has been made this goal for 3+ years.  He was able to quit cold turkey, without symptoms of withdrawal.  He quit because he was worried about his health, and was surprisingly easy without additional resources.  He occasionally up until a few months ago would rarely have a drink of alcohol if he was with a friend, but this has been minimal, and not for months.  No cravings for alcohol, no active social challenges from alcohol, no active legal issues from alcohol use.  He is aware that NICM can be caused/worsened by alcohol use, which is a major motivation for him to avoid use.    Other substances used:  He has a history of recreational substance use, but not for many years.  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  Ever traded goods for sex:     Methamphetamine use:  Among the above-mentioned recreational substances that he has used, includes methamphetamine.  Although he has smoked methamphetamines more frequently than other recreational drugs, he denies ever being a frequent user of methamphetamines, and would smoke meth infrequently, if he was around someone who was using.  He cannot recall exactly when he last used, but has been several months, and not since he moved to [place].  He has never had cravings for methamphetamines, does not have any justice system  related issues, denies active social history issues, withdrawal symptoms, or other issues from methamphetamine use.  Did not have any drug screen evidence of methamphetamine use as well.  He is aware that NICM can be caused/worsened by methamphetamine use, which is a major motivation for him to avoid use.  However, he feels strongly that he never used frequently enough for meth to have led to the NICM.    Identified race/ethnicity: white  Employed: not currently  Housing status: with Mom  Transportation: His mother is able to easily provide rides.  HIV status: negative    Review of Systems   The 10 point Review of Systems is negative other than noted in the HPI or here. Has SOB, stomach discomfort from heart failure.    PMHx:  NICM    Past Surgical History:   Procedure Laterality Date     CV RIGHT HEART CATH MEASUREMENTS RECORDED N/A 11/04/2021    Procedure: Heart Cath Right Heart Cath;  Surgeon: Rome Franklin MD;  Location:  HEART CARDIAC CATH LAB     CV RIGHT HEART CATH MEASUREMENTS RECORDED N/A 12/2/2021    Procedure: Right Heart Cath;  Surgeon: Bogdan Sanchez MD;  Location:  HEART CARDIAC CATH LAB     PICC SINGLE LUMEN PLACEMENT Right 11/09/2021    44cm (1cm external), Basilic vein       Social History   Social History     Socioeconomic History     Marital status:      Spouse name: Not on file     Number of children: Not on file     Years of education: Not on file     Highest education level: Not on file   Occupational History     Not on file   Tobacco Use     Smoking status: Never Smoker     Smokeless tobacco: Never Used   Substance and Sexual Activity     Alcohol use: Not Currently     Comment: used to be a heavy drinker up until 50     Drug use: Not on file     Sexual activity: Not on file   Other Topics Concern     Not on file   Social History Narrative     Not on file     Social Determinants of Health     Financial Resource Strain: Not on file   Food Insecurity: Not on file    Transportation Needs: Not on file   Physical Activity: Not on file   Stress: Not on file   Social Connections: Not on file   Intimate Partner Violence: Not on file   Housing Stability: Not on file       Family History   FAMILY HISTORY    Medications   I have reviewed this patient's current medications    Allergies   No Known Allergies    Physical Exam   Vital Signs: Temp: 96.4  F (35.8  C) Temp src: Oral BP: 99/41 Pulse: 75   Resp: 16 SpO2: 97 % O2 Device: None (Room air)    Weight: 125 lbs 0 oz    Gen: NAD  HEENT: EOMI, PERRL, MMM  CV: extremities warm and well perfused  Resp: breathing comfortably on RA  : deferred  Skin: no rashes  Neuro: nonfocal exam        Due to regulation of Title 42 of the Code of Federal Regulations (CFR) Part 2: Confidentiality laws apply to this note and the information wherein.  Thus, this note cannot be copy and pasted into any other health care staff's note nor can it be included in general medical records sent to ANY outside agency without the patient's written consent.

## 2021-12-07 NOTE — PLAN OF CARE
"ICU End of Shift Summary. See flowsheets for vital signs and detailed assessment.    Changes this shift: Pt A&Ox4. Pt intermittently anxious. Requesting dilaudid for sleep, pt Frequently requesting for swan catheter to be removed, states it has to be removed in procedural area, \"I won't let them remove it up here\". Afebrile, SR-ST 's with BBB. Heart rhythm changed on monitor and was alarming, ECG obtained and provider notified. Remains on Milrinone 0.375. Hemodynamics q6h. Art line removed overnight. Sats stable on RA. No BM this shift, pt refused any PRN stool softeners. Potassium replaced, recheck in AM.    Plan:  Continue monitoring cardiac status, q6h hemodynamics. Continue with current plan of care.   "

## 2021-12-07 NOTE — PLAN OF CARE
Neuro: A&Ox4. Calls appropriately.   Cardiac: SR. VSS, on Milrinone 0.375   Respiratory: SPO2 >96 on RA while awake. No cough  GI/: Adequate urine output. Uses urinal, clear roosevelt. BM 12/7  Diet/appetite: Tolerating regular diet. Good appetite, needs reminders about fluid restrictions  Activity:  Independent, just needs assist with lines.  Pain: At acceptable level on current regimen.  Skin: No new deficits noted.  LDA's: PIV, SG cath     Plan: Continue with POC. Notify primary team with changes. Discussed plan with patient and primary team, all questions answered.

## 2021-12-07 NOTE — PROGRESS NOTES
[facility]  Palliative Care Daily Progress Note       Recommendations & Counseling       Kavon at this time seems to understand how sick he is however I am concerned about his ability to cope with complications at this time. He identifies his mother as his support system currently and worries about being a burden on her.     Seems to be having active mental illness symptoms of depression, Would recommend considering mental health evaluation, treatment, and reasonable stability in his mental illness prior to receiving an LVAD.    It would be extremely important to revisit advanced care planning after he has experienced improvement of his mental health symptoms.    Appreciate LVAD SW involvement for patient support around his mental health and assistance with HCD completion.          Assessments          Kavon Banerjee is a 56 year old male with history of chronic systolic heart failure 2/2 NICM (LVEF < 10%), polysubstance abuse (EtOH, meth), newly diagnosed T2DM, chronic gout, and anxiety/depression who presents with worsening abdominal pain since most recent admission. Found to be in cardiogenic shock as [facility] and [facility] at last admission (11/1). Decline attributed to inotrope wean, not optimized medical therapy, and nonadherence to diet and fluid restrictions.     Today, the patient was seen for:  Chronic systolic HF 2/2 NICM with EF<10%, inotrope dependent  Goals of care    Prognosis, Goals, or Advance Care Planning was addressed today with: Yes.    LVAD preparedness planning     Patient's legally designated health care agent: would want his mother to make decisions      Patient's description of how they make decisions (by themselves, in consultation with certain close loved ones, based on advice from medical professionals, etc):  he is really not certain about how to approach decision making.         Patient's thoughts about what constitutes a 'good' quality of life/  what makes life worth living: being able to be more active.        Patient's personal goals/hopes for receiving the LVAD and how they anticipate future with LVAD to be like: he does hope to be able to be in a better place to be there for his children.        Patient's worries/concerns when considering receiving the LVAD: currently has no worries or concerns at this time. Does mention however also being worried about being a burden on his mother.        Patient's thoughts about what health conditions would be unacceptable (situations the patient would want her/his doctors and loved ones to know that she/he would not want life prolonged)? Seems that today he feels he would not want to undergo prolonged ICU care. When asked what a time frame look like to him he feels more than a couple of days.     He would not want to be dependent upon others and/or machines for the rest of his life.     The need to be on a ventilator/breathing machine through a tracheostomy (a tube in your neck) is a risk with LVAD. What are with circumstances you would want your medical providers and family to keep you alive with long term mechanical ventilation? He says today that he would not want prolonged interventions like a tracheostomy for the rest of his life. He also seemed to be fairly bothered by the possibility of prolonged ICU care that was measured in more weeks. He said he wouldn't want to be kept alive for more then a couple of days.    Mood, coping, and/or meaning in the context of serious illness were addressed today: Yes.  Summary/Comments: feeling depressed, at times feels unbearable. Last day or so has felt much better and reflected that its possibly because he reached out to two friends in [place].             Interval History:     Chart review/discussion with unit or clinical team members:   Notes reviewed, no acute events, getting colonoscopy tomorrow.     Per patient or family/caregivers today:  Patient feeling tired today,  was awake most of the night but not in a bad way, wasn't anxious, and actually felt good.     Key Palliative Symptoms:  We are not helping to manage these symptoms currently in this patient.               Review of Systems:     Besides above, ROS was reviewed and is unremarkable          Medications:     I have reviewed this patient's medication profile and medications during this hospitalization.           Physical Exam:   BP 95/68  HR 96  Temp 98.2  RR 16    Constitutional: Awake, alert, cooperative, no apparent distress  ENT: Normocephalic, without obvious abnormality, atramatic.  Lungs: No increased work of breathing, good air exchange  Neurologic: Awake, alert, oriented to name, place and time.    Neuropsychiatric: depressed, orientation, memory  Skin: No rashes, erythema             Data Reviewed:     Reviewed recent pertinent imaging, comments:   IMPRESSION:   1. Right IJ Westley-Livan catheter tip projects over the right main   pulmonary artery, slightly retracted from prior.   2. Unchanged diffuse interstitial and airspace opacities and   cardiomegaly.   3. Unchanged cardiomegaly.     Reviewed recent labs, comments:   Sodium 138  Potassium 2.1  Creatinine 0.98  GFR 87  WBC 6.0  Hemoglobin 12.3  Platelets 219    BRITTANY Sanches CNS  Palliative Care Consult Team  Pager: [phone]    45 minutes spent. This includes 25 minutes face to face with patient discussing current complex health conditions and prognosis, goals of care, advanced care planning, psychosocial assessment. Coordination of care with the primary team, palliative  and SW, LVAD SW regarding goals of care and psychosocial needs.

## 2021-12-07 NOTE — PLAN OF CARE
"Neuro: A&Ox4. Calls appropriately. Slightly manic at times  Cardiac: SR/ST. VSS on milrinone gtt at 0.375  Respiratory: SPO2>92 on RA while awake.   GI/: Adequate urine output. Uses the urinal Last BM 12/2, refuses bowel meds  Diet/appetite: Tolerating regular diet. Good appetite. Needs reminders about fluid intake.  Activity:  Standby Assist.  Pain: At acceptable level on current regimen, scheduled tylenol  Skin: No new deficits noted.  LDA's: R SG catheter, R Rad nelson     Plan: Continue with POC. Notify primary team with changes. Pt very anxious to get lines out, insists that they are removed in IR. Pt was reassured that it will be safe wherever the lines are removed, he stated \"I don't trust those people who put this in.\" Calm and cooperative for most of shift, needs to be approached in a relaxed manner. Wants to leave hospital soon \"if nothing is being done for me here.\"   "

## 2021-12-07 NOTE — PROGRESS NOTES
[facility]   Cardiology II Service / Advanced Heart Failure  Daily Progress Note  Date of Service: 12/07/2021      Patient: Kavon Banerjee  MRN: [id number]  Admission Date: 12/1/2021  Hospital Day # 6    Assessment and Plan:   Kavon Banerjee is a 56 year old male with history of chronic systolic heart failure 2/2 NICM (LVEF < 10%), polysubstance abuse (EtOH, meth), newly diagnosed T2DM, chronic gout, and anxiety/depression who presents with worsening abdominal pain since most recent admission. Found to be in cardiogenic shock as OSH and [facility] at last admission (11/1). Decline attributed to inotrope wean, not optimized medical therapy, and nonadherence to diet and fluid restrictions. Patient in need of tailored therapy, ongoing LVAD workup with the expectation that patient will abstain from substances of abuse for at least 6 months.      Today's Plan:  - Continue oral hydralazine 75 mg and Isordil 30 mg  - continue milrinone to 0.375  - CVP 4-6 today  - starting oral torsemide 60 mg BID   - Q6H Hemodynamics and MVO2  - Goal MAP 60-80 (ideally 60-70)  - appreciate f/u addiction medicine recommendations  - discontinue a-line (poor waveform)  - GI consult for inpatient colonoscopy (LVAD evaluation)        # Chronic Systolic HF 2/2 NICM with EF < 10%  # Abdominal pain 2/2 poor perfusion, resolved  # Hyponatremia, resolved  # Inotrope dependence  Stage D, NYHA Class IV  Patient is on home inotropes as bridge to possible candidacy for advanced therapies. He has not been wearing his LifeVest due to it exacerbating his abdominal pain.  - Lactate 1.1 12/04 (2.1 on admission)  - Venous blood gas: Oxyhgb 31% at admission.   - Sodium 138 12/04  - ACEI/ARB/ARNI: Start low dose Entresto  - Afterload reduction: On Isordil/Hydral  - BB: deferred due to cardiogenic shock  - Aldosterone antagonist: deferred due to low BP  - SGLT2i: deferred for now  - Inotropy: Milrinone decresaed to 0.375mcg/kg/min  - SCD placement:  consider ICD at later time (discussed with EP)  - Fluid status: Give lasix 100mg IV in AM  - Dilaudid injection 0.2 mg IV q4hrs as needed for moderate-severe pain    # Goals of care  - palliative care consulted given prior DNR status and inotrope dependence ?candidate for adv therapies  - EP consult made, no plan for primary prevntion ICD until after LVAD; if no LVAD, will be hospice and primary prevention ICD will not be in line with goals of care    # Gout  Continue allopurinol and colchicine plan.     # T2DM  Elevated A1c      # Polysubstance Abuse  Consider chem dep referral. Reports prior use of cocaine, methamphetamine, mushrooms, LSD. Most recent meth use within the last month. Reports having his EtOH use under control but still drinks liquor from time to time socially. He is aware he will need to complete chem dep program in order to be considered for advanced heart therapies in the future.   - Urine drug screen negative 12/1  - PEth negative 12/1  - addiction medicine reconsulted/made aware patient readmitted     # Anxiety/Depression  Continue Lexapro 10 mg daily    # Cataracts  Surgery scheduled for 12/15 at outside hospital.       FEN: Cardiac diet   PROPHY: regular  DISPO:  ICU for cardiogenic shock  CODE STATUS:  Full    Werner Streeter MD, MSc  Cardiovascular Disease Fellow  [facility]      Discussed with Dr Wilson.     ==========================================    Interval History/ROS:  Reviewed events from last 24 hours.  No acute events overnight.  No new symptoms.  Afterload uptitrated overnight for rise in SVR    ROS:  4 point ROS including Respiratory, CV, GI and , other than that noted in the HPI, is negative.     Medications:     Current Facility-Administered Medications   Medication     - MEDICATION INSTRUCTIONS -     acetaminophen (TYLENOL) tablet 975 mg     allopurinol (ZYLOPRIM) tablet 100 mg     colchicine (COLCYRS) tablet 0.6 mg     escitalopram (LEXAPRO)  tablet 10 mg     hydrALAZINE (APRESOLINE) tablet 75 mg     hydrOXYzine (ATARAX) tablet 25 mg    Or     hydrOXYzine (ATARAX) tablet 50 mg     influenza recomb quadrivalent PF (FLUBLOK) injection 0.5 mL     isosorbide dinitrate (ISORDIL) tablet 40 mg     melatonin tablet 5 mg     milrinone (PRIMACOR) infusion 200 mcg/mL PREMIX     multivitamin w/minerals (THERA-VIT-M) tablet 1 tablet     naloxone (NARCAN) injection 0.2 mg    Or     naloxone (NARCAN) injection 0.4 mg    Or     naloxone (NARCAN) injection 0.2 mg    Or     naloxone (NARCAN) injection 0.4 mg     pantoprazole (PROTONIX) EC tablet 40 mg     polyethylene glycol (MIRALAX) powder 17 g     ramelteon (ROZEREM) tablet 8 mg     Reason ACE/ARB/ARNI order not selected     Reason beta blocker not prescribed     simethicone (MYLICON) chewable tablet 80 mg     traMADol (ULTRAM) half-tab 50 mg     Physical Exam:   BP (!) 88/60   Pulse 97   Temp (!) 96.7  F (35.9  C) (Axillary)   Resp 16   Wt 89.5 kg (197 lb 5 oz)   SpO2 97%   BMI 26.03 kg/m       GENERAL: Appears alert and oriented times three.   HEENT: Eye symmetrical and free of discharge bilaterally. Mucous membranes moist and without lesions.  NECK: Supple and without lymphadenopathy. R internal jugular SGC in place..   CV: RRR, S1S2 present without murmur, rub, or gallop. R Radial nelson place   RESPIRATORY: Respirations regular, even, and unlabored. Lungs CTA throughout.   GI: Soft and non distended with normoactive bowel sounds present in all quadrants. No tenderness, rebound, guarding. No organomegaly.   EXTREMITIES: No peripheral edema. 2+ bilateral pedal pulses.   NEUROLOGIC: Alert and orientated x 3.   MUSCULOSKELETAL: No joint swelling or tenderness.   SKIN: No jaundice. No rashes or lesions.     Data:  CMP  Recent Labs   Lab 12/07/21  0321 12/06/21  1604 12/06/21  0537 12/05/21  1706 12/03/21  1616 12/03/21  0539    139 138 139   < > 137   POTASSIUM 2.1*  3.7 3.6 3.8 3.6   < > 3.7   CHLORIDE 108  109 108 108   < > 104   CO2 24 25 22 23   < > 24   ANIONGAP 6 5 8 8   < > 9   GLC 97 85 86 116*   < > 87   BUN 14 15 17 17   < > 23   CR 0.98 1.06 1.22 1.25   < > 1.52*   GFRESTIMATED 87 79 67 65   < > 51*   JESSICA 9.0 8.6 8.8 8.7   < > 8.6   MAG 2.3 2.1 2.1 2.0   < > 2.3   PHOS  --   --   --   --   --  4.0   PROTTOTAL 6.3* 6.1* 6.2* 6.6*   < > 5.5*   ALBUMIN 3.0* 2.8* 2.9* 3.2*   < > 3.0*   BILITOTAL 0.8 0.7 1.0 1.1   < > 1.6*   ALKPHOS 116 124 117 126   < > 113   AST 18 41 17 16   < > 16   ALT 13 14 13 15   < > 13    < > = values in this interval not displayed.     CBC  Recent Labs   Lab 12/07/21  0321 12/06/21  0537 12/05/21  0409 12/04/21  0440   WBC 6.0 6.2 6.3 5.1   RBC 5.15 4.99 4.58 4.42   HGB 12.3* 12.0* 10.8* 10.5*   HCT 39.2* 38.0* 35.2* 35.0*   MCV 76* 76* 77* 79   MCH 23.9* 24.0* 23.6* 23.8*   MCHC 31.4* 31.6 30.7* 30.0*   RDW 24.4* 24.6* 24.3* 24.0*    230 215 196     INR  Recent Labs   Lab 12/03/21  0539   INR 1.42*        RHC 12/2/21  RA: --/--/7  RV:45/10  PA:45/23/34  PCW:--/--/25    PVR 2.6  SVR 1800    Anthony CO/CI: 3.19/1.49  TD CO/CI: 2.7/1.26 Right sided filling pressures are normal. Left sided filling pressures are moderately elevated. Mild elevated pulmonary hypertension. Reduced cardiac output level.      Intake/Output Summary (Last 24 hours) at 12/7/2021 1621  Last data filed at 12/7/2021 1400  Gross per 24 hour   Intake 1846.8 ml   Output 1625 ml   Net 221.8 ml     Net IO Since Admission: -2,884.59 mL [12/07/21 1621]    I have reviewed today's vital signs, notes, medications, labs and imaging.  I have also seen and examined the patient and agree with the findings and plan as outlined above.  Pt with endstage heart failure secondary to alcohol abuse with tobacco abuse now on milrinone and hydralazine/isordil with diuresis.  Plan to remove SG catheter and continue LVAD eval.  Total critical care time 40 min.     Justice Rivers MD, PhD  Professor, Heart Failure and Cardiac  Transplantation  [facility]

## 2021-12-08 NOTE — PROGRESS NOTES
Care Management Follow Up    Length of Stay (days): 7    Expected Discharge Date:  12/10/21     Concerns to be Addressed: Discharge planning   Patient plan of care discussed at interdisciplinary rounds: Yes    Anticipated Discharge Disposition: Home,Home Infusion     Anticipated Discharge Services: Home infusion     Additional Information:  Per chart review and discussion with the team, pt is on home IV milrinone provided by [facility].  Pt is undergoing LVAD work up.  Plan to discharge pt home with increase dose of IV milrinone in the next 1-2 days.  RNCC provided update to [facility] liaisons and resumption home infusion order placed on the discharge form.  RNCC will cont to follow plan of care.      Hazel Rangel RN, PHN, BSN  4A and 4E/ ICU  Care Coordinator  Phone: [phone]  Pager: [phone]    To get in touch with weekend & Holiday on call RN Care Coordinator  Page [phone] or Care Coordinator Job code/pager- [phone]

## 2021-12-08 NOTE — PROGRESS NOTES
[facility]   Cardiology II Service / Advanced Heart Failure  Daily Progress Note  Date of Service: 12/08/2021      Patient: Kavon Banerjee  MRN: [id number]  Admission Date: 12/1/2021  Hospital Day # 7    Assessment and Plan:   Kavon Banerjee is a 56 year old male with history of chronic systolic heart failure 2/2 NICM (LVEF < 10%), polysubstance abuse (EtOH, meth), newly diagnosed T2DM, chronic gout, and anxiety/depression who presents with worsening abdominal pain since most recent admission. Found to be in cardiogenic shock as OSH and [facility] at last admission (11/1). Decline attributed to inotrope wean, not optimized medical therapy, and nonadherence to diet and fluid restrictions. Patient in need of tailored therapy, ongoing LVAD workup with the expectation that patient will abstain from substances of abuse for at least 6 months. Cardiogenic shock resolved.     Today's Plan:  - trend MVO2 from PICC  - Continue oral hydralazine 75 mg and Isordil 30 mg  - continue milrinone to 0.375  - continue oral torsemide 60 mg BID   - goal ~1-2L negative  - GI consult for inpatient colonoscopy (LVAD evaluation)        # Acute on Chronic Combined Systolic and Diastolic CHF 2/2 NICM with EF < 10%  # Abdominal pain 2/2 poor perfusion, resolved  # Hyponatremia, resolved  # Inotrope dependence  Stage D, NYHA Class IV  Patient is on home inotropes as bridge to possible candidacy for advanced therapies. He has not been wearing his LifeVest due to it exacerbating his abdominal pain.  - Lactate 1.1 12/04 (2.1 on admission)  - Venous blood gas: Oxyhgb 31% at admission.   - Sodium 138 12/04  - ACEI/ARB/ARNI: Start low dose Entresto  - Afterload reduction: On Isordil/Hydral  - BB: deferred due to cardiogenic shock  - Aldosterone antagonist: now on spironolactone  - SGLT2i: deferred for now  - Inotropy: Milrinone 0.375mcg/kg/min  - SCD placement: Life Vest  - Fluid status: Oral torsemide  - Dilaudid injection 0.2 mg IV q4hrs  as needed for moderate-severe pain    # Goals of care  - palliative care consulted given prior DNR status and inotrope dependence ?candidate for adv therapies  - EP consult made, no plan for primary prevntion ICD until after LVAD (sternotomy after recent ICD will dislodge lead); if no LVAD, will be hospice and primary prevention ICD will not be in line with goals of care    # Gout  Continue allopurinol and colchicine plan.     # T2DM  Elevated A1c      # Polysubstance Abuse  Consider chem dep referral. Reports prior use of cocaine, methamphetamine, mushrooms, LSD. Most recent meth use within the last month. Reports having his EtOH use under control but still drinks liquor from time to time socially. He is aware he will need to complete chem dep program in order to be considered for advanced heart therapies in the future.   - Urine drug screen negative 12/1  - PEth negative 12/1  - addiction medicine reconsulted/made aware patient readmitted     # Anxiety/Depression  Continue Lexapro 10 mg daily    # Cataracts  Surgery scheduled for 12/15 at outside hospital.       FEN: Cardiac diet   PROPHY: regular  DISPO:  ICU for cardiogenic shock  CODE STATUS:  Full    Werner Streeter MD, MSc  Cardiovascular Disease Fellow  [facility]      Discussed with Dr Wilson.     ==========================================    Interval History/ROS:  Reviewed events from last 24 hours.  No acute events overnight.  No new symptoms.  SGC removed overnight.  Discussed with GI yesterday, will see patient today.     ROS:  4 point ROS including Respiratory, CV, GI and , other than that noted in the HPI, is negative.     Medications:     Current Facility-Administered Medications   Medication     - MEDICATION INSTRUCTIONS -     acetaminophen (TYLENOL) tablet 975 mg     allopurinol (ZYLOPRIM) tablet 100 mg     colchicine (COLCYRS) tablet 0.6 mg     escitalopram (LEXAPRO) tablet 10 mg     hydrALAZINE (APRESOLINE) tablet 75  mg     hydrOXYzine (ATARAX) tablet 25 mg    Or     hydrOXYzine (ATARAX) tablet 50 mg     influenza recomb quadrivalent PF (FLUBLOK) injection 0.5 mL     isosorbide dinitrate (ISORDIL) tablet 40 mg     magnesium oxide (MAG-OX) tablet 400 mg     melatonin tablet 5 mg     milrinone (PRIMACOR) infusion 200 mcg/mL PREMIX     multivitamin w/minerals (THERA-VIT-M) tablet 1 tablet     naloxone (NARCAN) injection 0.2 mg    Or     naloxone (NARCAN) injection 0.4 mg    Or     naloxone (NARCAN) injection 0.2 mg    Or     naloxone (NARCAN) injection 0.4 mg     pantoprazole (PROTONIX) EC tablet 40 mg     polyethylene glycol (MIRALAX) powder 17 g     potassium chloride ER (KLOR-CON M) CR tablet 20 mEq     ramelteon (ROZEREM) tablet 8 mg     Reason ACE/ARB/ARNI order not selected     Reason beta blocker not prescribed     simethicone (MYLICON) chewable tablet 80 mg     spironolactone (ALDACTONE) tablet 25 mg     torsemide (DEMADEX) tablet 60 mg     traMADol (ULTRAM) half-tab 50 mg     Physical Exam:   BP (!) 88/61   Pulse 96   Temp 98  F (36.7  C) (Oral)   Resp 18   Wt 89.5 kg (197 lb 5 oz)   SpO2 94%   BMI 26.03 kg/m       GENERAL: Appears alert and oriented times three.   HEENT: Eye symmetrical and free of discharge bilaterally. Mucous membranes moist and without lesions.  NECK: Supple and without lymphadenopathy. R internal jugular SGC in place..   CV: RRR, S1S2 present without murmur, rub, or gallop. R Radial nelson place   RESPIRATORY: Respirations regular, even, and unlabored. Lungs CTA throughout.   GI: Soft and non distended with normoactive bowel sounds present in all quadrants. No tenderness, rebound, guarding. No organomegaly.   EXTREMITIES: No peripheral edema. 2+ bilateral pedal pulses.   NEUROLOGIC: Alert and orientated x 3.   MUSCULOSKELETAL: No joint swelling or tenderness.   SKIN: No jaundice. No rashes or lesions.     Data:  CMP  Recent Labs   Lab 12/08/21  0453 12/07/21  1725 12/07/21  0321 12/06/21  1604  12/03/21  1616 12/03/21  0539    138 138 139   < > 137   POTASSIUM 3.6 3.8 2.1*  3.7 3.6   < > 3.7   CHLORIDE 104 106 108 109   < > 104   CO2 26 25 24 25   < > 24   ANIONGAP 8 7 6 5   < > 9   GLC 94 139* 97 85   < > 87   BUN 17 13 14 15   < > 23   CR 1.15 1.01 0.98 1.06   < > 1.52*   GFRESTIMATED 72 84 87 79   < > 51*   JESSICA 8.9 8.8 9.0 8.6   < > 8.6   MAG 2.0 2.0 2.3 2.1   < > 2.3   PHOS  --   --   --   --   --  4.0   PROTTOTAL 6.4* 6.6* 6.3* 6.1*   < > 5.5*   ALBUMIN 3.1* 3.1* 3.0* 2.8*   < > 3.0*   BILITOTAL 0.8 0.8 0.8 0.7   < > 1.6*   ALKPHOS 119 123 116 124   < > 113   AST 18 24 18 41   < > 16   ALT 15 16 13 14   < > 13    < > = values in this interval not displayed.     CBC  Recent Labs   Lab 12/08/21  0453 12/07/21  0321 12/06/21  0537 12/05/21  0409   WBC 6.4 6.0 6.2 6.3   RBC 4.90 5.15 4.99 4.58   HGB 11.6* 12.3* 12.0* 10.8*   HCT 37.8* 39.2* 38.0* 35.2*   MCV 77* 76* 76* 77*   MCH 23.7* 23.9* 24.0* 23.6*   MCHC 30.7* 31.4* 31.6 30.7*   RDW 24.6* 24.4* 24.6* 24.3*    219 230 215     INR  Recent Labs   Lab 12/03/21  0539   INR 1.42*        RHC 12/2/21  RA: --/--/7  RV:45/10  PA:45/23/34  PCW:--/--/25    PVR 2.6  SVR 1800    Anthony CO/CI: 3.19/1.49  TD CO/CI: 2.7/1.26 Right sided filling pressures are normal. Left sided filling pressures are moderately elevated. Mild elevated pulmonary hypertension. Reduced cardiac output level.        Intake/Output Summary (Last 24 hours) at 12/8/2021 1408  Last data filed at 12/8/2021 1100  Gross per 24 hour   Intake 1890 ml   Output 3500 ml   Net -1610 ml     Net IO Since Admission: -4,494.59 mL [12/08/21 1408]    I have reviewed today's vital signs, notes, medications, labs and imaging.  I have also seen and examined the patient and agree with the findings and plan as outlined above.  Pt with endstage heart failure and cardiogenic shock on milrinone 0.375 mcg/kg/min, hydralazine and isordil with aldactone awaiting LVAD workup.  Pt will need colonoscopy prior  to presentation.  Pt seen X2 for total critical care time 30 min.     Justice Rivers MD, PhD  Professor, Heart Failure and Cardiac Transplantation  [facility]

## 2021-12-08 NOTE — CONSULTS
GASTROENTEROLOGY CONSULTATION      Date of Admission:  12/1/2021          ASSESSMENT AND RECOMMENDATIONS:     56 year old male with a history of chronic systolic heart failure 2/2 NICM (LVEF < 10%), polysubstance abuse (EtOH, meth), newly diagnosed T2DM, chronic gout, and anxiety/depression who is being managed inpatient for cardiogenic shock with ongoing work up for LVAD with expectation that patient will continue to abstain from sustance use for atleast 6 months. GI is being consulted for colonoscopic evaluation as part of patient's pre LVAD work up.     Patient denies diarrhea, melena, hematochezia, abdominal pain or weight loss. He denies family hx of colon cancer. He states he has never had a colonoscopy done. He denies chest pain, reports intermittent SOB otherwise denies any other concerns at this time    Patient is agreeable to having a colonoscopy done as part pf his pre LVAD work up.         RECOMMENDATIONS  - Clear liquid diet today and golytely prep overnight ( 4 liters starting at 2 pm and 2 liters in the morning)  -If well prepped tomorrow we will proceed with colonoscopy   -NPO after midnight     Thank you for involving us in this patient's care. Please do not hesitate to contact the GI service with any questions or concerns.     Patient care plan discussed with Dr. Coleman, GI staff physician.    Nader Wilkins MD  Gastroenterology Fellow  Pager             Chief Complaint:   We were asked by Werner Streeter MD of the cardiology team to see patient for colonoscopic evaluation as part of his pre transplant work up    History is obtained from the patient and the medical record.          History of Present Illness:   Kavon Banerjee is a 56 year old male with a history of chronic systolic heart failure 2/2 NICM (LVEF < 10%), polysubstance abuse (EtOH, meth), newly diagnosed T2DM, chronic gout, and anxiety/depression who is being managed inpatient for cardiogenic shock with ongoing work up  for LVAD with expectation that patient will continue to abstain from sustance use for atleast 6 months Patient states he is feeling well today. He states he does have intermittent SOB but is not having SOB at the time of this medical interview. He denies chest pain, abdominal pain, diarrhea, hematochezia, melena, weight loss. Fever or chills. He states he has never had a colonoscopy done. He denies any family history of colon cancers. He states he feels well at this time. Cardiology team is consulting GI for colonoscopic evaluation as part of a pre LVAD work up.             Past Medical History:   Reviewed and edited as appropriate  No past medical history on file.         Past Surgical History:   Reviewed and edited as appropriate   Past Surgical History:   Procedure Laterality Date     CV RIGHT HEART CATH MEASUREMENTS RECORDED N/A 11/04/2021    Procedure: Heart Cath Right Heart Cath;  Surgeon: Rome Franklin MD;  Location:  [facility]ATH [facility]     CV RIGHT HEART CATH MEASUREMENTS RECORDED N/A 12/2/2021    Procedure: Right Heart Cath;  Surgeon: Bogdan Sanchez MD;  Location:  [facility]ATH [facility]     PICC SINGLE LUMEN PLACEMENT Right 11/09/2021    44cm (1cm external), Basilic vein            Previous Endoscopy:   No results found for this or any previous visit.         Social History:   Reviewed and edited as appropriate  Social History     Socioeconomic History     Marital status:      Spouse name: Not on file     Number of children: Not on file     Years of education: Not on file     Highest education level: Not on file   Occupational History     Not on file   Tobacco Use     Smoking status: Never Smoker     Smokeless tobacco: Never Used   Substance and Sexual Activity     Alcohol use: Not Currently     Comment: used to be a heavy drinker up until 50     Drug use: Not on file     Sexual activity: Not on file   Other Topics Concern     Not on file   Social History Narrative     Not on file      Social Determinants of Health     Financial Resource Strain: Not on file   Food Insecurity: Not on file   Transportation Needs: Not on file   Physical Activity: Not on file   Stress: Not on file   Social Connections: Not on file   Intimate Partner Violence: Not on file   Housing Stability: Not on file            Family History:   Reviewed and edited as appropriate  No known history of gastrointestinal/liver disease or  gastrointestinal malignancies       Allergies:   Reviewed and edited as appropriate     Allergies   Allergen Reactions     Pneumococcal Polysaccharides Other (See Comments)          Review of Systems:     A complete review of systems was performed and is negative except as noted in the HPI           Physical Exam:   BP (!) 88/61   Pulse 96   Temp 98.2  F (36.8  C) (Oral)   Resp 18   Wt 89.5 kg (197 lb 5 oz)   SpO2 96%   BMI 26.03 kg/m    Wt:   Wt Readings from Last 2 Encounters:   12/03/21 89.5 kg (197 lb 5 oz)   12/01/21 93.3 kg (205 lb 9.6 oz)      Constitutional: cooperative, pleasant, not dyspneic/diaphoretic, no acute distress  Eyes: Sclera anicteric/injected  Ears/nose/mouth/throat: Mucus membranes moist  Neck: supple  CV: No edema  Respiratory: Unlabored breathing on room air  Abdomen:  Nondistended, +bs, no hepatosplenomegaly, nontender, no peritoneal signs  Skin: warm, perfused, no jaundice  Neuro: AAO x 3, No asterixis  Psych: Normal affect         Data:   Labs and imaging below were independently reviewed and interpreted    BMP  Recent Labs   Lab 12/08/21  0453 12/07/21  1725 12/07/21  0321 12/06/21  1604    138 138 139   POTASSIUM 3.6 3.8 2.1*  3.7 3.6   CHLORIDE 104 106 108 109   JESSICA 8.9 8.8 9.0 8.6   CO2 26 25 24 25   BUN 17 13 14 15   CR 1.15 1.01 0.98 1.06   GLC 94 139* 97 85     CBC  Recent Labs   Lab 12/08/21  0453 12/07/21  0321 12/06/21  0537 12/05/21  0409   WBC 6.4 6.0 6.2 6.3   RBC 4.90 5.15 4.99 4.58   HGB 11.6* 12.3* 12.0* 10.8*   HCT 37.8* 39.2* 38.0* 35.2*    MCV 77* 76* 76* 77*   MCH 23.7* 23.9* 24.0* 23.6*   MCHC 30.7* 31.4* 31.6 30.7*   RDW 24.6* 24.4* 24.6* 24.3*    219 230 215     INR  Recent Labs   Lab 12/03/21  0539   INR 1.42*     LFTs  Recent Labs   Lab 12/08/21  0453 12/07/21  1725 12/07/21  0321 12/06/21  1604   ALKPHOS 119 123 116 124   AST 18 24 18 41   ALT 15 16 13 14   BILITOTAL 0.8 0.8 0.8 0.7   PROTTOTAL 6.4* 6.6* 6.3* 6.1*   ALBUMIN 3.1* 3.1* 3.0* 2.8*      PANC  Recent Labs   Lab 12/01/21  1443   LIPASE 94   AMYLASE 23*       Imaging:    US Abdomen 12/3                                                                       IMPRESSION:   1.  Hepatomegaly with heterogeneously increased hepatic parenchymal  echogenicity, suggestive of hepatic steatosis (mild).  2.  Trace perihepatic fluid may be related to volume overload seen on  the previous CT.  3.  Partially imaged trace right pleural effusion.  (new since CT  12/2/21)  4.  Mild splenomegaly.

## 2021-12-08 NOTE — PLAN OF CARE
ICU End of Shift Summary. See flowsheets for vital signs and detailed assessment.    Changes this shift:  Pt brought down to PACU/Surge ICU beds this afternoon. Pt 's vitals not pulling into chart, manually entered hourly thought taken more frequently. VSS. Pt unhooking self from monitors throughout the day and having to hook pt up when found unhooked. No central monitoring availible. Pt has had a good appetite. Jackpot clotted. Plan to pull Jackpot and cordis if CARDS 2 ok's after VBG results.

## 2021-12-08 NOTE — PLAN OF CARE
Major Shift Events:  A&Ox4. N/T to BLE at baseline. MAP goal 60-80. Milrinone @ 0.375mcg/kg/min. Tolerating cardiac diet w/ 2L FR. BM x1. Voiding spontaneously. Denies pain. [place] pulled.   Plan: Transfer to floor once bed available. Continue to monitor and update team if there are any changes.   For vital signs and complete assessments, please see documentation flowsheets.

## 2021-12-08 NOTE — PLAN OF CARE
Major Shift Events: A+Ox4; VSS; tele sinus rhythm/ sinus tach w BBB; BS+, passing gas, no BM on my shift; LS clear, denies SOB, on RA; voiding adequately; milrinone @ 0.375; able to make needs known    Plan: colonoscopy tomorrow for LVAD workup; NPO @ midnight    For vital signs and complete assessments, please see documentation flowsheets.      Pal Montoya RN, BSN

## 2021-12-09 NOTE — DISCHARGE SUMMARY
[facility]   Discharge Summary     Kavon Banerjee MRN# [id number]   YOB: 1965 Age: 56 year old     DATE OF ADMISSION: 12/1/2021  DATE OF DISCHARGE: 12/9/2021  ADMITTING PROVIDER: Donaldo Stein MD  DISCHARGE PROVIDER: Justice Rivers  PRIMARY PROVIDER: No Ref-Primary, Physician         Reason for Admission:   Kavon Banerjee is a 56 year old male with history of chronic systolic heart failure 2/2 NICM (LVEF < 10%), polysubstance abuse (EtOH, meth), newly diagnosed T2DM, chronic gout, and anxiety/depression who presented with worsening abdominal pain since most recent admission. Found to be in cardiogenic shock as OSH and [employer] at last admission (11/1). Decline attributed to inotrope wean, not optimized medical therapy, and nonadherence to diet and fluid restrictions. Patient was in need of tailored therapy, ongoing LVAD workup with the expectation that patient will abstain from substances of abuse for at least 6 months. Cardiogenic shock resolved and patient was optimized on GDMT with plan for continued outpatient LVAD workup upon discharge.          Discharge Diagnosis:      Acute on Chronic Combined Systolic and Diastolic CHF 2/2 NICM with EF < 10%  Abdominal pain 2/2 poor perfusion, resolved  Hyponatremia, resolved  Inotrope dependence  Gout  Type 2 DM  Hx polysubstance abuse, in remission  Anxiety/Depression         Medication Changes:   STARTED   > Hydralazine 75 mg PO q8h  > Imdur 120 mg PO daily  > Spironolactone 25 mg PO daily    CHANGED  > Milrinone gtt increased to 37.5  > Potassium chloride 20 mEq PO BID (40 mEq BID prior)  > Torsemide 60 mg PO BID (60/40 prior)  > 400 mg magnesium oxide PO BID         Follow Up:   - Follow up with primary care provider within 7 days for hospital follow- up.  The following labs/tests are recommended: CBC, CMP.  - Follow up with CORE clinic provider Hillary HELTON, on 12/20/21 at 10:15 am with labs prior.  - Follow up with  gastroenterology for repeat colonoscopy in 6 months to review the polypectomy site.         Pending Results:   Colonoscopy biopsy pathology pending         Hospital Course by Problem:      # Acute on Chronic Combined Systolic and Diastolic CHF 2/2 NICM with EF < 10%  # Abdominal pain 2/2 poor perfusion, resolved  # Hyponatremia, resolved  # Inotrope dependence  Stage D, NYHA Class IV  Patient is on home inotropes as bridge to possible candidacy for advanced therapies. PTA, was not wearing his LifeVest due to it exacerbating his abdominal pain. Lactate 1.1 12/04 (2.1 on admission). Venous blood gas: Oxyhgb 31% at admission. Sodium 138 12/04.  TTE 12/9/2021: 5-10% severely reduced, severe LV dilation (LVIDd 8.4 cm). Mild RV dilation. Mild to moderate TR, mild MR and AI. RV systolic pressure is 34mmHg above the right atrial pressure.  RHC/Coronary angiogram:   - ACEI/ARB/ARNI: Initiated Entresto, but patient's blood pressures could not tolerate. Could consider retrial as outpatient  - Afterload reduction: Isordil 40 mg TID switched to Imdur 120 mg daily upon discharge d/t financial coverage; Hydralazine 75 mg PO q8h  - BB: Deferred due to cardiogenic shock  - Aldosterone antagonist: Spironolactone 25 mg PO daily  - SGLT2i: Deferred for now  - Inotropy: Milrinone 0.375 mcg/kg/min  - SCD placement: Life Vest  - Fluid status: Oral torsemide 60 mg PO BID with potassium chloride 20 mEq PO BID  - Colonoscopy completed for LVAD workup, findings as noted below. No evidence of malignancy but had 2 polyps removed with repeat colonoscopy recommended in 6 months to review the polypectomy site.     # Gout - Continue PTA allopurinol and colchicine  Follow-up with rheumatology as outpatient     # T2DM A1c 5.7 on 12/1/2021     # Polysubstance Abuse  Reports prior use of cocaine, methamphetamine, mushrooms, LSD. Most recent meth use within the last month. Reports having his EtOH use under control but still drinks liquor from time to  time socially. He is aware he will need to complete chem dep program in order to be considered for advanced heart therapies in the future. Urine drug screen negative 12/1. PEth negative 12/1.  - Addiction medicine reconsulted, provided resources     # Anxiety/Depression  Continue Lexapro 10 mg daily     # Cataracts  Surgery scheduled for 12/15 at outside hospital.    # Goals of care  Palliative care consulted given prior DNR status and inotrope dependence ?candidate for adv therapies. EP consulted, no plan for primary prevention ICD until after LVAD (sternotomy after recent ICD will dislodge lead); if no LVAD, will be hospice and primary prevention ICD will not be in line with goals of care.    Physical Exam on day of Discharge:  Blood pressure 99/65, pulse 102, temperature 97.6  F (36.4  C), temperature source Oral, resp. rate 18, weight 87.5 kg (193 lb), SpO2 94 %.  GENERAL: NAD, nontoxic appearing, appears comfortable.   HEENT: EOMI, anicteric, PERRL, NC/AT  NECK: Supple and without lymphadenopathy.  CV: RRR, S1S2 present without murmur, rub, or gallop.  RESPIRATORY: CTA b/l, no wheezing crackles or rhonchi   GI: Soft and non distended with normoactive bowel sounds present in all quadrants. No tenderness, rebound, guarding. No organomegaly.   EXTREMITIES: No peripheral edema. 2+ bilateral pedal pulses.   NEUROLOGIC: Alert and orientated x 3. No focal neurologic deficits.  MUSCULOSKELETAL: No joint swelling or tenderness.   SKIN: No jaundice. No rashes or lesions.          Procedures & Significant Findings:   Colonoscopy 12/9/2021:  Impression:    One 12mm polyp resected with hot snare after lift,                          then clipped x3. Second polyp was immediately proximal                          to IC valve and was lifted but unable to be resected                          in one attempt- instead was piecemeal resected.                          - One 12 mm polyp in the cecum, removed using                           injection-lift and a hot snare. Resected and                          retrieved. Clips were placed.                          - One 10 mm polyp in the cecum, removed piecemeal                          using a cold snare. Resected and retrieved.                          - Diverticulosis in the entire examined colon.                          - Internal hemorrhoids.                          - The examination was otherwise normal on direct and                          retroflexion views.                          - The examined portion of the ileum was normal.   Recommendation:                         - Return patient to [facility] for ongoing care.                          - Advance diet as tolerated.                          - Continue present medications.                          - Await pathology results.                          - Repeat colonoscopy in 6 months to review the                          polypectomy site.                          - If able, hold AC for 24 hours, but it is ok to                          restart if needed.    US MILY Doppler 12/1/2021:  Impression:   Right leg: Resting MILY is 1.66. Non-compressible, >1.40.  Left leg: Resting MILY is 1.45. Non-compressible, >1.40.    US LE Arterial Duplex Bilateral 12/1/2021:  IMPRESSION: Negative study.    US Carotid Bilateral:  1. Right side: Degree of stenosis of the internal carotid artery: Normal, no  evidence of stenosis.  2. Left side: Degree of stenosis of the internal carotid artery: Normal, no  evidence of stenosis.    TTE 12/2/2021:  Interpretation Summary  The left ventricular ejection fraction is 5-10% (severely reduced). Severe  left ventricular dilation (LVIDd 8.4 cm).  Mild right ventricular dilation. Normal RV TAPSE and tissue doppler, however  visually RV function appears mildly to moderately reduced.  Mild MR and AI.  Mild to moderate TR.  Right ventricular systolic pressure is 34mmHg above the right atrial pressure.  IVC diameter  >2.1 cm collapsing <50% with sniff suggests a high RA pressure  estimated at 15 mmHg or greater.  No pericardial effusion is present.     This study was compared with the study from 11/2/2021. No significant changes  Noted.    Cardiac Catheterization 12/2/2021:  RA: --/--/7  RV:45/10  PA:45/23/34  PCW:--/--/25  Anthony CO/CI: 3.19/1.49  TD CO/CI: 2.7/1.26 Right sided filling pressures are normal. Left sided filling pressures are moderately elevated. Moderately elevated pulmonary artery hypertension. Reduced cardiac output level.           Discharge Medications:     Current Discharge Medication List      START taking these medications    Details   hydrALAZINE (APRESOLINE) 25 MG tablet Take 3 tablets (75 mg) by mouth every 8 hours  Qty: 270 tablet, Refills: 0    Associated Diagnoses: Acute on chronic systolic congestive heart failure (H)      isosorbide mononitrate CR (IMDUR) 120 MG 24 HR ER tablet Take 1 tablet (120 mg) by mouth every morning  Qty: 30 tablet, Refills: 0    Associated Diagnoses: Acute on chronic systolic congestive heart failure (H)      pantoprazole (PROTONIX) 40 MG EC tablet Take 1 tablet (40 mg) by mouth every morning (before breakfast)  Qty: 30 tablet, Refills: 0    Associated Diagnoses: Gastroesophageal reflux disease, unspecified whether esophagitis present      spironolactone (ALDACTONE) 25 MG tablet Take 1 tablet (25 mg) by mouth daily  Qty: 30 tablet, Refills: 0    Associated Diagnoses: Acute on chronic systolic congestive heart failure (H)         CONTINUE these medications which have CHANGED    Details   escitalopram (LEXAPRO) 10 MG tablet Take 1 tablet (10 mg) by mouth At Bedtime  Qty: 30 tablet, Refills: 0    Associated Diagnoses: Depression, unspecified depression type      magnesium oxide (MAG-OX) 400 MG tablet Take 1 tablet (400 mg) by mouth 2 times daily  Qty: 60 tablet, Refills: 0    Associated Diagnoses: Acute on chronic systolic congestive heart failure (H)      milrinone (PRIMACOR)  infusion 200 mcg/mL PREMIX Inject 33.4125 mcg/min into the vein continuous  Qty: 250 mL, Refills: 0    Associated Diagnoses: Acute on chronic systolic congestive heart failure (H)      potassium chloride ER (KLOR-CON M) 20 MEQ CR tablet Take 1 tablet (20 mEq) by mouth 2 times daily  Qty: 60 tablet, Refills: 0    Associated Diagnoses: Nonischemic cardiomyopathy (H)      ramelteon (ROZEREM) 8 MG tablet Take 1 tablet (8 mg) by mouth every evening  Qty: 30 tablet, Refills: 0    Associated Diagnoses: Other insomnia      torsemide (DEMADEX) 20 MG tablet Take 3 tablets (60 mg) by mouth 2 times daily  Qty: 180 tablet, Refills: 0    Associated Diagnoses: Acute on chronic systolic congestive heart failure (H)         CONTINUE these medications which have NOT CHANGED    Details   acetaminophen (TYLENOL) 325 MG tablet Take 2 tablets (650 mg) by mouth every 4 hours as needed for mild pain    Associated Diagnoses: Idiopathic chronic gout with tophus, unspecified site      allopurinol (ZYLOPRIM) 100 MG tablet Take 1 tablet (100 mg) by mouth daily  Qty: 90 tablet, Refills: 1    Associated Diagnoses: Chronic gout of wrist, unspecified cause, unspecified laterality      alum & mag hydroxide-simethicone (MAALOX) 200-200-20 MG/5ML SUSP suspension Take 30 mLs by mouth every 4 hours as needed for indigestion  Qty: 355 mL, Refills: 0    Associated Diagnoses: Nausea      Carboxymethylcellulose Sodium (ARTIFICIAL TEARS OP) Place 1 drop into both eyes 4 times daily      colchicine (COLCYRS) 0.6 MG tablet Take 1 tablet (0.6 mg) by mouth daily  Qty: 90 tablet, Refills: 1    Associated Diagnoses: Chronic gout of wrist, unspecified cause, unspecified laterality      predniSONE (DELTASONE) 20 MG tablet Take 20 mg by mouth as needed (Gout flares)       senna-docusate (SENOKOT-S/PERICOLACE) 8.6-50 MG tablet Take 1 tablet by mouth 2 times daily as needed for constipation    Associated Diagnoses: Nausea         STOP taking these medications        polyethylene glycol (MIRALAX) 17 GM/Dose powder Comments:   Reason for Stopping:                    Discharge Instructions and Follow-Up:     Discharge Procedure Orders   Home infusion referral   Referral Priority: Routine Referral Type: Consultation   Number of Visits Requested: 1     Cardiac Rehab Referral   Standing Status: Future   Referral Priority: Routine Referral Type: Rehab Therapy Cardiac Therapy     Reason for your hospital stay   Order Comments: You were admitted due to worsening heart failure, transferred to the [facility] ER from an outside hospital after being found in cardiogenic shock. You were initially in the ICU, but your condition stabilized after being started on oral medications to optimize your heart function. You will discharge home with these oral medications and the IV dobutamine which is needed to help your heart function. It is essential that you continue these oral medications and follow up with your cardiologists to demonstrate your sobriety and candidacy for more advanced therapies in the future. Please contact your doctors with any questions or concerns.     Activity   Order Comments: Your activity upon discharge: activity as tolerated     Order Specific Question Answer Comments   Is discharge order? Yes      Activity   Order Comments: Your activity upon discharge: activity as tolerated     Order Specific Question Answer Comments   Is discharge order? Yes      Monitor and record   Order Comments: Weigh yourself every morning     When to contact your care team   Order Comments: Contact your care team If symptoms get worse, If increased shortness of breath, If waking up at night with difficulty breathing, If unable to lie down for sleep due to symptoms, If weight gain of 2 pounds a day for 2 days in a row OR 5 pounds in 1 week., Increased swelling in your ankles or legs, and Dizziness or lightheadedness     Discharge Follow Up - with Primary Care clinic within 3-5 days (RN to  schedule prior to d/c for HE/[facility]ira primary care     Add follow up information to the AVS prior to printing     Adult [facility] Follow-up and recommended labs and tests   Order Comments: Follow up with primary care provider within 7 days for hospital follow- up.  The following labs/tests are recommended: CBC, CMP.  Follow up with CORE clinic provider Hillary HELTON, on 12/20/21 at 10:15 am with labs prior.    Appointments on [place] and/or [facility] (with [facility] or [facility] provider or service). Call [phone] if you haven't heard regarding these appointments within 7 days of discharge.     MD face to face evaluation   Order Comments: Documentation of Face to Face and Certification for Home Health Services    I certify that patient: Kavon Banerjee is under my care and that I, or a nurse practitioner or physician's assistant working with me, had a face-to-face encounter that meets the physician face-to-face encounter requirements with this patient on: 12/9/2021.    This encounter with the patient was in whole, or in part, for the following medical condition, which is the primary reason for home health care: Decompensated heart failure.    I certify that, based on my findings, the following services are medically necessary home health services: Nursing.    My clinical findings support the need for the above services because: Nurse is needed: home IV infusion.    Further, I certify that my clinical findings support that this patient is homebound (i.e. absences from home require considerable and taxing effort and are for medical reasons or Mandae[facility] services or infrequently or of short duration when for other reasons) because: Requires assistance of another person or specialized equipment to access medical services because patient:  home IV infusion.    Based on the above findings. I certify that this patient is confined to the home and needs intermittent skilled nursing care, physical therapy and/or speech  therapy.  The patient is under my care, and I have initiated the establishment of the plan of care.  This patient will be followed by a physician who will periodically review the plan of care.  Physician/Provider to provide follow up care: Alexandria Potts Physician    Attending hospital physician (the Medicare certified [facility] provider): Justice Rivers MD  Physician Signature: See electronic signature associated with these discharge orders.  Date: 12/9/2021     Diet   Order Comments: Follow this diet upon discharge: Orders Placed This Encounter      Fluid restriction 2000 ML FLUID      Snacks/Supplements Adult: Ensure Enlive; Between Meals      Fluid restriction 2000 ML FLUID      Clear Liquid Diet      NPO per Anesthesia Guidelines for Procedure/Surgery Except for: Meds      NPO per Anesthesia Guidelines for Procedure/Surgery Except for: Meds, Other; Specify: except prep     Order Specific Question Answer Comments   Is discharge order? Yes      Diet   Order Comments: Follow this diet upon discharge: Orders Placed This Encounter      Fluid restriction 2000 ML FLUID      Snacks/Supplements Adult: Ensure Enlive; Between Meals      Fluid restriction 2000 ML FLUID      Clear Liquid Diet      NPO per Anesthesia Guidelines for Procedure/Surgery Except for: Meds      NPO per Anesthesia Guidelines for Procedure/Surgery Except for: Meds, Other; Specify: except prep     Order Specific Question Answer Comments   Is discharge order? Yes               Condition on Discharge:     Discharge condition: Stable   Code status on discharge: Full Code        Date of service: 12/9/2021    The patient was discussed with Dr. Rivers.    60 minutes spent in discharge, including >50% in counseling and coordination of care, medication review and plan of care recommended on follow up. Questions were answered.   Dr. Alexandria ChavarriaPrimary, Physician  (PCP) was contacted at the time of discharge, so as to bridge from hospital to outpatient care.      It was our pleasure to care for Kavon Banerjee during this hospitalization. Please do not hesitate to contact me should there be questions regarding the hospital course or discharge plan.      Tamia Chung MD  Internal Medicine, PGY-1  Pager: [phone]  I have reviewed today's vital signs, notes, medications, labs and imaging.  I have also seen and examined the patient and agree with the findings and plan as outlined above.  Pt is 57 yo WM with remote hx of alcohol use and polysubstance abuse now endstage heart failure being discharged on increased milrinone therapy and afterload reduction therapy.  Will be discharged to home and will follow up in clinic.  Will continue LVAD eval.      Justice Rivers MD, PhD  Professor, Heart Failure and Cardiac Transplantation  [facility]

## 2021-12-09 NOTE — CONSULTS
"56 year old male presenting with CHF.  CORE consult requested. Kavon is currently admitted with HF.    Patient  was provided with a CHF book.  I reviewed the importance of daily weights, 2 gm sodium diet, 2L fluid restriction and compliance with medications upon hospital discharge.  CORE contact phone numbers provided and patient is encouraged to call with any questions or concerns, including any weight gain or loss of 2 or more pounds in 24 hours or 5 or more pounds in 1 week.      Appointment made in CORE clinic on 21 at 10:30 with labs at 10:00.  I will follow-up with the patient at that time, sooner if needed.  Thank you for the consult.    Jermaine Mcnally RN  Cardiology Care Coordinator   [facility]  Questions and [phone]  First press #1 for the [place] and then press #3 for \"Medical Advise\" to reach us Cardiology Nurses.    "

## 2021-12-09 NOTE — PLAN OF CARE
Transferred to: Unit 6[address]  at 2220  Belongings: cellphone, clothing, cane, jacket, boots  [place] removed? N/A  Central line removed? No, home PICC in place for continuous Milrinone infusion.   Chart and medications sent with patient Yes  Family notified:Patient updated family.

## 2021-12-09 NOTE — PLAN OF CARE
Pt admitted 12/1 for management of cardiogenic shock with ongoing work up for LVAD. PMHx of NICM (LVEF < 10%), polysubstance abuse (EtOH, meth), newly diagnosed T2DM, chronic gout, and anxiety/depression.    Neuro: A/O x4; pleasant and cooperative.   Cardiac/Tele:  pt SR/ST with HRs 90-100s  Respiratory: on room air with O2 sats >92%. Pt dyspneic with activity but improved with rest  GI/: pt voiding. 2L golytely finished this morning. Multiple loose BMs this morning. Pt went down for colonoscopy around noon  Diet/Appetite: pt was NPO most of day in preparation for colonoscopy - now on 2g Na+; appetite good  Skin: pt witih generalized scabs.   LDAs: right PICC infusing milrinone at 0.375 mcg/kg/min. Right PIV saline locked  Activity: up independently in room  Pain: pt reports intermittent pain behind his eyes when drinking the Golytely; declined meds this morning. Pain now gone  Labs: K+ this morning 3.6, replaced per protocol  Procedures/tests: Colonoscopy this afternoon - 2 polyps found      Plan: Discharge home after colonoscopy and delivery of home IV infusion supplies. Continue to monitor and assess pt with every encounter. Notify Cards 2 with any questions or concerns.

## 2021-12-09 NOTE — DISCHARGE SUMMARY
Take your medicines every day, as directed       Monitor Your Weight and Symptoms    Contact us if you:      Gain 2 pounds in one day or 5 pounds in one week    Feel more short of breath    Notice more leg swelling    Feel lightheadeded   Change your lifestyle    Limit Salt or Sodium:    2000 mg  Limit Fluids:    2000 mL or approximately 64 ounces  Eat a Heart Healthy Diet    Low in saturated fats  Stay Active:    Aim to move at least 150 minutes every  week         To Contact us    During Business Hours:  [phone], option # 1 (University)  Then option # 4 (medical questions)     After hours, weekends or holidays:   [phone], Option #4  Ask to speak to the On-Call Cardiologist. Inform them you are a CORE/heart failure patient at the [facility].     Use [employer] allows you to communicate directly with your heart team through secure messaging.    [employer] can be accessed any time on your phone, computer, or tablet.    If you need assistance, we'd be happy to help!         Keep your Heart Appointments:    12/20/21--10:30: CORE appointment with Hillary Tabor with labs at 10:00.  The appointments are at the [facility] located at [address] [place] [address], [id number]

## 2021-12-09 NOTE — OR NURSING
Colonoscopy under conscious sedation.  Polyps removed with cold and hot snare, ERBE settings 2/20.  3 clips placed on one polypectomy site.  Pt tolerated procedure.  Report called to Cindy PEREIRA at 1302hrs.  Pt to transport to room on  2lpm 02 via NC.

## 2021-12-09 NOTE — CONSULTS
I started the pre-LVAD/transplant neuropsychological evaluation this afternoon. There are many sensitive topics to discuss, and Mr. Banerjee was not in a private room. He was also prepping for discharge in about an hour. We agreed it would be best to complete the evaluation in our outpatient clinic next week. I will see him Thursday 12/16/21 at 9:00am.     Baldo Catalan, PhD, LP, ABPP-CN  Board Certified in Clinical Neuropsychology ([id number])

## 2021-12-09 NOTE — PLAN OF CARE
D: Admitted 12/1 for management of cardiogenic shock with ongoing work up for LVAD.  History of NICM (LVEF < 10%), polysubstance abuse (EtOH, meth), newly diagnosed T2DM, chronic gout, and anxiety/depression.     I: Monitored vitals and assessed patient status.   Changed: NPO since midnight with exception of Golytely   Running: milrinone gtt at 0.375 mcg/kg/min     A: A&Ox4. On room air. SR/ST 80's-100's, VSS, afebrile. Urinating adequately. Extra 2L of Golytely needed overnight, patient still working on remaining 1L. Stools liquid but not yet clear this AM. Up independent.     P: Continue to monitor. Continue to workup for LVAD. Colonoscopy today.

## 2021-12-10 NOTE — PLAN OF CARE
DISCHARGE   Discharged to: Home  Via: Automobile  Accompanied by: RN staff down to pharmacy then front lobby  Discharge Instructions: diet, activity, medications, follow up appointments, when to call the MD, and what to watchout for (i.e. s/s of infection, increasing SOB, palpitations, chest pain,)  Prescriptions: To be filled by [facility] Discharge pharmacy per pt's request; medication list reviewed & sent with pt  Follow Up Appointments: arranged; information given  Belongings: All sent with pt  IV: PIV out; PICC infusing milrinone  Telemetry: off  Pt exhibits understanding of above discharge instructions; all questions answered.  Discharge Paperwork: faxed  __________    K+ this afternoon was 3.4, replacement given per protocol. Discharge education done at the bedside with pt. All questions answered and addressed. Home IV milrinone infusion supplies delivered by [facility]. Pt disconnected from hospital pump to home pump by pt with the guidance of RN. Milrinone running at 0.375 mcg/kg/min through right PICC. Vascular team was also able to come change pt's dressing. All personal items packed including new IV supplies. Pt ambulated down to discharge pharmacy and then front lobby where pt's mom will be picking him up.

## 2021-12-10 NOTE — PROGRESS NOTES
This is a recent snapshot of the patient's [facility] medical record.  For current drug dose and complete information and questions, call [phone] or In Basket pool, fv home infusion ([id number])  CSN Number:  [id number]

## 2021-12-11 NOTE — PLAN OF CARE
Physical Therapy Discharge Summary    Reason for therapy discharge:    Discharged to home.    Progress towards therapy goal(s). See goals on Care Plan in [facility] electronic health record for goal details.  Goals partially met.  Barriers to achieving goals:   discharge from facility.    Therapy recommendation(s):    No further therapy is recommended.

## 2021-12-16 NOTE — TELEPHONE ENCOUNTER
Ppatient was unable to see Dr. Weiss 12/6/21 as scheduled because he was inpatient.  Placed call to patient to offer a follow up appointment in the rheumatology clinic with Dr. Robert on 12/21/21 at 10:30 am. Left message offering appointment and requested a return call to confirm appointment.   Debora Mckenzie RN  Adult Rheumatology Clinic
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
## 2021-12-17 NOTE — NURSING NOTE
The patient was seen for neuropsychological evaluation at the request of Dr. Rome Franklin, for the purposes of diagnostic clarification and treatment planning. 198 minutes of test administration and scoring were provided by this writer, Phil Hodges. Please see Dr. Baldo Catalan's report for a full interpretation of the findings.

## 2021-12-19 NOTE — PROGRESS NOTES
HPI:   Mr. Kavon Banerjee is a 56yr old male with a longstanding history of chronic systolic heart failure secondary to NICM (LVEF < 10%, LVEDD 8.0cm) c/b recent cardiogenic shock (hospitalized at OSH, required inotropic support, left AMA 11/1) and polysubstance abuse (alcohol, meth). He underwent rehospitalization from 12/1-12/9/21 for decompensated heart failure. He was optimized with medications and discharged to home on IV Milrinone as potential bridge to advanced therapies. He presents to [facility] for hospital follow up.     He complains of DUNN, which is done with minimal activity. He notes an improvement in abdominal distention and indigestion and resolution of epigastric pressure. He denies fever, chills, lightheadedness, dizziness, chest pain, palpitations, PND, orthopnea, nausea, vomiting, diarrhea, or LE edema. His weight has decreased down to 186 lbs. He continues to follow a low sodium diet.  His BP at home with SBP 90's. He notes good appetite. He denies any concerns with his PICC line. He quit drinking about 3 years ago with no recent polysubstance.     PAST MEDICAL HISTORY:  Past Medical History:   Diagnosis Date     Chronic systolic congestive heart failure (H)      Polysubstance abuse (H)      FAMILY HISTORY:  Family History   Problem Relation Age of Onset     Hypertension Mother      Hypertension Father      Prostate Cancer Father      Cancer Father      Substance Abuse Father      Hypertension Brother        SOCIAL HISTORY:  Social History     Socioeconomic History     Marital status:      Spouse name: Not on file     Number of children: Not on file     Years of education: Not on file     Highest education level: Not on file   Occupational History     Not on file   Tobacco Use     Smoking status: Never Smoker     Smokeless tobacco: Never Used   Substance and Sexual Activity     Alcohol use: Not Currently     Comment: used to be a heavy drinker up until 50     Drug use: Not on file     Sexual  activity: Not on file   Other Topics Concern     Not on file   Social History Narrative     Not on file     Social Determinants of Health     Financial Resource Strain: Not on file   Food Insecurity: Not on file   Transportation Needs: Not on file   Physical Activity: Not on file   Stress: Not on file   Social Connections: Not on file   Intimate Partner Violence: Not on file   Housing Stability: Not on file       CURRENT MEDICATIONS:  Outpatient Medications Prior to Visit   Medication Sig Dispense Refill     acetaminophen (TYLENOL) 325 MG tablet Take 2 tablets (650 mg) by mouth every 4 hours as needed for mild pain       allopurinol (ZYLOPRIM) 100 MG tablet Take 1 tablet (100 mg) by mouth daily (Patient taking differently: Take 300 mg by mouth daily ) 90 tablet 1     alum & mag hydroxide-simethicone (MAALOX) 200-200-20 MG/5ML SUSP suspension Take 30 mLs by mouth every 4 hours as needed for indigestion 355 mL 0     colchicine (COLCYRS) 0.6 MG tablet Take 1 tablet (0.6 mg) by mouth daily 90 tablet 1     escitalopram (LEXAPRO) 10 MG tablet Take 1 tablet (10 mg) by mouth At Bedtime 30 tablet 0     isosorbide mononitrate CR (IMDUR) 120 MG 24 HR ER tablet Take 1 tablet (120 mg) by mouth every morning 30 tablet 0     magnesium oxide (MAG-OX) 400 MG tablet Take 1 tablet (400 mg) by mouth 2 times daily 60 tablet 0     milrinone (PRIMACOR) infusion 200 mcg/mL PREMIX Inject 33.4125 mcg/min into the vein continuous 250 mL 0     pantoprazole (PROTONIX) 40 MG EC tablet Take 1 tablet (40 mg) by mouth every morning (before breakfast) 30 tablet 0     potassium chloride ER (KLOR-CON M) 20 MEQ CR tablet Take 1 tablet (20 mEq) by mouth 2 times daily 60 tablet 0     predniSONE (DELTASONE) 20 MG tablet Take 20 mg by mouth as needed (Gout flares)        ramelteon (ROZEREM) 8 MG tablet Take 1 tablet (8 mg) by mouth every evening 30 tablet 0     spironolactone (ALDACTONE) 25 MG tablet Take 1 tablet (25 mg) by mouth daily 30 tablet 0      "torsemide (DEMADEX) 20 MG tablet Take 3 tablets (60 mg) by mouth 2 times daily 180 tablet 0     Carboxymethylcellulose Sodium (ARTIFICIAL TEARS OP) Place 1 drop into both eyes 4 times daily (Patient not taking: Reported on 12/20/2021)       senna-docusate (SENOKOT-S/PERICOLACE) 8.6-50 MG tablet Take 1 tablet by mouth 2 times daily as needed for constipation (Patient not taking: Reported on 12/20/2021)       hydrALAZINE (APRESOLINE) 25 MG tablet Take 3 tablets (75 mg) by mouth every 8 hours 270 tablet 0     No facility-administered medications prior to visit.       ROS:   CONSTITUTIONAL: Denies fever, chills, fatigue, or weight fluctuations.   HEENT: Denies headache, vision changes, and changes in speech.   CV: Refer to HPI.   PULMONARY:Denies shortness of breath, cough, or previous TB exposure.   GI:Denies nausea, vomiting, diarrhea, and abdominal pain. Bowel movements are regular.   :Denies urinary alterations, dysuria, urinary frequency, hematuria, and abnormal drainage.   EXT:Denies lower extremity edema.   SKIN:Denies abnormal rashes or lesions.   MUSCULOSKELETAL:Denies upper or lower extremity weakness and pain.   NEUROLOGIC:Denies lightheadedness, dizziness, seizures, or upper or lower extremity paresthesia.     EXAM:  /66 (BP Location: Left arm, Patient Position: Chair, Cuff Size: Adult Regular)   Pulse 95   Ht 1.812 m (5' 11.34\")   Wt 86 kg (189 lb 9.6 oz)   SpO2 98%   BMI 26.19 kg/m    GENERAL: Appears alert and oriented times three.   HEENT: Eye symmetrical and free of discharge bilaterally. Mucous membranes moist and without lesions.  NECK: Supple and without lymphadenopathy. JVD 8 cm.   CV: RRR, S1S2 present without murmur, rub, or gallop.      RESPIRATORY: Respirations regular, even, and unlabored. Lungs CTA throughout.   GI: Soft and non distended with normoactive bowel sounds present in all quadrants. No tenderness, rebound, guarding. No organomegaly.   EXTREMITIES: No peripheral edema. " 2+ bilateral pedal pulses.   NEUROLOGIC: Alert and orientated x 3. CN II-XII grossly intact. No focal deficits.   MUSCULOSKELETAL: No joint swelling or tenderness.   SKIN: No jaundice. No rashes or lesions. PICC line CDI.     The following Labs were reviewed today:  CBC RESULTS:  Lab Results   Component Value Date    WBC 4.7 12/09/2021    RBC 4.94 12/09/2021    HGB 11.8 (L) 12/09/2021    HCT 37.3 (L) 12/09/2021    MCV 76 (L) 12/09/2021    MCH 23.9 (L) 12/09/2021    MCHC 31.6 12/09/2021    RDW 25.1 (H) 12/09/2021     12/09/2021       CMP RESULTS:  Lab Results   Component Value Date     12/20/2021    POTASSIUM 3.6 12/20/2021    POTASSIUM 2.1 (LL) 12/07/2021    CHLORIDE 101 12/20/2021    CO2 26 12/20/2021    ANIONGAP 9 12/20/2021    GLC 87 12/20/2021    BUN 23 12/20/2021    CR 1.19 12/20/2021    GFRESTIMATED 69 12/20/2021    JESSICA 9.4 12/20/2021    BILITOTAL 1.4 (H) 12/09/2021    ALBUMIN 3.2 (L) 12/09/2021    ALKPHOS 111 12/09/2021    ALT 16 12/09/2021    AST 22 12/09/2021        INR RESULTS:  Lab Results   Component Value Date    INR 1.42 (H) 12/03/2021       Lab Results   Component Value Date    MAG 2.2 12/09/2021     Lab Results   Component Value Date    NTBNPI 3,752 (H) 12/03/2021     Lab Results   Component Value Date    NTBNP 4,529 (H) 12/20/2021       The following diagnostics were reviewed today:   Echo 12/2/21:   Interpretation Summary  The left ventricular ejection fraction is 5-10% (severely reduced). Severe  left ventricular dilation (LVIDd 8.4 cm).  Mild right ventricular dilation. Normal RV TAPSE and tissue doppler, however  visually RV function appears mildly to moderately reduced.  Mild MR and AI.  Mild to moderate TR.  Right ventricular systolic pressure is 34mmHg above the right atrial pressure.  IVC diameter >2.1 cm collapsing <50% with sniff suggests a high RA pressure  estimated at 15 mmHg or greater.  No pericardial effusion is present.     This study was compared with the study from  11/2/2021. No significant changes  Noted.    RHC 12/2/21:   RA: --/--/7  RV:45/10  PA:45/23/34  PCW:--/--/25    Anthony CO/CI: 3.19/1.49  TD CO/CI: 2.7/1.26 Right sided filling pressures are normal. Left sided filling pressures are moderately elevated. Moderately elevated pulmonary artery hypertension. Reduced cardiac output level.    Assessment and Plan:   Mr. Kavon Banerjee is a 56yr old male with a longstanding history of chronic systolic heart failure secondary to NICM (LVEF < 10%, LVEDD 8.0cm) c/b recent cardiogenic shock (hospitalized at OSH, required inotropic support, left AMA 11/1) and polysubstance abuse (alcohol, meth). He presents to [facility] for hospital follow up at euvolemic state.     Acute on Chronic Systolic Heart Failure secondary to NICM. Secondary to polysubstance abuse (meth and ETOH). Complicated by cardiogenic shock. NCA per Angiogram 10/26/21 at OSH.   Stage C, NYHA Class III  ACEi/ARB Entresto 24/26 mg po BID resumed. Hold Hydralazine. Consider up titration of Entresto in 1 week with repeat BMP at that time.   BB contraindicated due to low cardiac output. Milrinone at 0.375 mg/kg/min  Aldosterone antagonist deferred while other medical therapy is prioritized  SCD prophylaxis: Given current inotrope support informed pt he is to wear his lifevest at all times, which he is not wearing currently. He is open to ICD implantation.   Fluid status: Euvolemic. Continue Torsemide 60 mg po BID.   - Currently monitoring on home inotropes with advanced therapy workup underway.   - Re[facility] given current inotropes.     Gout.   - Follow up with Rheumatology and Surgery as prior.      Polysubstance Abuse. History of cocaine, meth, mushrooms, LSD, and ETOH abuse. Drug and peth negative 11/2, but admits to sipping ETOH when out with friends.   - Reiterated complete abstinence and need for CD treatment prior to advanced therapy candidacy.      DM Type II,  controlled. Hgb A1C-6.7.  - Dietary management.     Follow up BMP in 1 week and CORE in 2 weeks.       BRITTANY Patel CNP  12/19/2021          CC  SELF, REFERRED

## 2021-12-20 NOTE — LETTER
12/20/2021      RE: Kavon Banerjee  [address]  [place] [id number]       Dear Colleague,    Thank you for the opportunity to participate in the care of your patient, Kavon Banerjee, at the [facility] [place] [facility]. Please see a copy of my visit note below.    HPI:   Mr. Kavon Banerjee is a 56yr old male with a longstanding history of chronic systolic heart failure secondary to NICM (LVEF < 10%, LVEDD 8.0cm) c/b recent cardiogenic shock (hospitalized at OSH, required inotropic support, left AMA 11/1) and polysubstance abuse (alcohol, meth). He underwent rehospitalization from 12/1-12/9/21 for decompensated heart failure. He was optimized with medications and discharged to home on IV Milrinone as potential bridge to advanced therapies. He presents to [facility] for hospital follow up.     He complains of DUNN, which is done with minimal activity. He notes an improvement in abdominal distention and indigestion and resolution of epigastric pressure. He denies fever, chills, lightheadedness, dizziness, chest pain, palpitations, PND, orthopnea, nausea, vomiting, diarrhea, or LE edema. His weight has decreased down to 186 lbs. He continues to follow a low sodium diet.  His BP at home with SBP 90's. He notes good appetite. He denies any concerns with his PICC line. He quit drinking about 3 years ago with no recent polysubstance.     PAST MEDICAL HISTORY:  Past Medical History:   Diagnosis Date     Chronic systolic congestive heart failure (H)      Polysubstance abuse (H)      FAMILY HISTORY:  Family History   Problem Relation Age of Onset     Hypertension Mother      Hypertension Father      Prostate Cancer Father      Cancer Father      Substance Abuse Father      Hypertension Brother        SOCIAL HISTORY:  Social History     Socioeconomic History     Marital status:      Spouse name: Not on file     Number of children: Not on  file     Years of education: Not on file     Highest education level: Not on file   Occupational History     Not on file   Tobacco Use     Smoking status: Never Smoker     Smokeless tobacco: Never Used   Substance and Sexual Activity     Alcohol use: Not Currently     Comment: used to be a heavy drinker up until 50     Drug use: Not on file     Sexual activity: Not on file   Other Topics Concern     Not on file   Social History Narrative     Not on file     Social Determinants of Health     Financial Resource Strain: Not on file   Food Insecurity: Not on file   Transportation Needs: Not on file   Physical Activity: Not on file   Stress: Not on file   Social Connections: Not on file   Intimate Partner Violence: Not on file   Housing Stability: Not on file       CURRENT MEDICATIONS:  Outpatient Medications Prior to Visit   Medication Sig Dispense Refill     acetaminophen (TYLENOL) 325 MG tablet Take 2 tablets (650 mg) by mouth every 4 hours as needed for mild pain       allopurinol (ZYLOPRIM) 100 MG tablet Take 1 tablet (100 mg) by mouth daily (Patient taking differently: Take 300 mg by mouth daily ) 90 tablet 1     alum & mag hydroxide-simethicone (MAALOX) 200-200-20 MG/5ML SUSP suspension Take 30 mLs by mouth every 4 hours as needed for indigestion 355 mL 0     colchicine (COLCYRS) 0.6 MG tablet Take 1 tablet (0.6 mg) by mouth daily 90 tablet 1     escitalopram (LEXAPRO) 10 MG tablet Take 1 tablet (10 mg) by mouth At Bedtime 30 tablet 0     isosorbide mononitrate CR (IMDUR) 120 MG 24 HR ER tablet Take 1 tablet (120 mg) by mouth every morning 30 tablet 0     magnesium oxide (MAG-OX) 400 MG tablet Take 1 tablet (400 mg) by mouth 2 times daily 60 tablet 0     milrinone (PRIMACOR) infusion 200 mcg/mL PREMIX Inject 33.4125 mcg/min into the vein continuous 250 mL 0     pantoprazole (PROTONIX) 40 MG EC tablet Take 1 tablet (40 mg) by mouth every morning (before breakfast) 30 tablet 0     potassium chloride ER (KLOR-CON M)  "20 MEQ CR tablet Take 1 tablet (20 mEq) by mouth 2 times daily 60 tablet 0     predniSONE (DELTASONE) 20 MG tablet Take 20 mg by mouth as needed (Gout flares)        ramelteon (ROZEREM) 8 MG tablet Take 1 tablet (8 mg) by mouth every evening 30 tablet 0     spironolactone (ALDACTONE) 25 MG tablet Take 1 tablet (25 mg) by mouth daily 30 tablet 0     torsemide (DEMADEX) 20 MG tablet Take 3 tablets (60 mg) by mouth 2 times daily 180 tablet 0     Carboxymethylcellulose Sodium (ARTIFICIAL TEARS OP) Place 1 drop into both eyes 4 times daily (Patient not taking: Reported on 12/20/2021)       senna-docusate (SENOKOT-S/PERICOLACE) 8.6-50 MG tablet Take 1 tablet by mouth 2 times daily as needed for constipation (Patient not taking: Reported on 12/20/2021)       hydrALAZINE (APRESOLINE) 25 MG tablet Take 3 tablets (75 mg) by mouth every 8 hours 270 tablet 0     No facility-administered medications prior to visit.       ROS:   CONSTITUTIONAL: Denies fever, chills, fatigue, or weight fluctuations.   HEENT: Denies headache, vision changes, and changes in speech.   CV: Refer to HPI.   PULMONARY:Denies shortness of breath, cough, or previous TB exposure.   GI:Denies nausea, vomiting, diarrhea, and abdominal pain. Bowel movements are regular.   :Denies urinary alterations, dysuria, urinary frequency, hematuria, and abnormal drainage.   EXT:Denies lower extremity edema.   SKIN:Denies abnormal rashes or lesions.   MUSCULOSKELETAL:Denies upper or lower extremity weakness and pain.   NEUROLOGIC:Denies lightheadedness, dizziness, seizures, or upper or lower extremity paresthesia.     EXAM:  /66 (BP Location: Left arm, Patient Position: Chair, Cuff Size: Adult Regular)   Pulse 95   Ht 1.812 m (5' 11.34\")   Wt 86 kg (189 lb 9.6 oz)   SpO2 98%   BMI 26.19 kg/m    GENERAL: Appears alert and oriented times three.   HEENT: Eye symmetrical and free of discharge bilaterally. Mucous membranes moist and without lesions.  NECK: Supple " and without lymphadenopathy. JVD 8 cm.   CV: RRR, S1S2 present without murmur, rub, or gallop.      RESPIRATORY: Respirations regular, even, and unlabored. Lungs CTA throughout.   GI: Soft and non distended with normoactive bowel sounds present in all quadrants. No tenderness, rebound, guarding. No organomegaly.   EXTREMITIES: No peripheral edema. 2+ bilateral pedal pulses.   NEUROLOGIC: Alert and orientated x 3. CN II-XII grossly intact. No focal deficits.   MUSCULOSKELETAL: No joint swelling or tenderness.   SKIN: No jaundice. No rashes or lesions. PICC line CDI.     The following Labs were reviewed today:  CBC RESULTS:  Lab Results   Component Value Date    WBC 4.7 12/09/2021    RBC 4.94 12/09/2021    HGB 11.8 (L) 12/09/2021    HCT 37.3 (L) 12/09/2021    MCV 76 (L) 12/09/2021    MCH 23.9 (L) 12/09/2021    MCHC 31.6 12/09/2021    RDW 25.1 (H) 12/09/2021     12/09/2021       CMP RESULTS:  Lab Results   Component Value Date     12/20/2021    POTASSIUM 3.6 12/20/2021    POTASSIUM 2.1 (LL) 12/07/2021    CHLORIDE 101 12/20/2021    CO2 26 12/20/2021    ANIONGAP 9 12/20/2021    GLC 87 12/20/2021    BUN 23 12/20/2021    CR 1.19 12/20/2021    GFRESTIMATED 69 12/20/2021    JESSICA 9.4 12/20/2021    BILITOTAL 1.4 (H) 12/09/2021    ALBUMIN 3.2 (L) 12/09/2021    ALKPHOS 111 12/09/2021    ALT 16 12/09/2021    AST 22 12/09/2021        INR RESULTS:  Lab Results   Component Value Date    INR 1.42 (H) 12/03/2021       Lab Results   Component Value Date    MAG 2.2 12/09/2021     Lab Results   Component Value Date    NTBNPI 3,752 (H) 12/03/2021     Lab Results   Component Value Date    NTBNP 4,529 (H) 12/20/2021       The following diagnostics were reviewed today:   Echo 12/2/21:   Interpretation Summary  The left ventricular ejection fraction is 5-10% (severely reduced). Severe  left ventricular dilation (LVIDd 8.4 cm).  Mild right ventricular dilation. Normal RV TAPSE and tissue doppler, however  visually RV function  appears mildly to moderately reduced.  Mild MR and AI.  Mild to moderate TR.  Right ventricular systolic pressure is 34mmHg above the right atrial pressure.  IVC diameter >2.1 cm collapsing <50% with sniff suggests a high RA pressure  estimated at 15 mmHg or greater.  No pericardial effusion is present.     This study was compared with the study from 11/2/2021. No significant changes  Noted.    RHC 12/2/21:   RA: --/--/7  RV:45/10  PA:45/23/34  PCW:--/--/25    Anthony CO/CI: 3.19/1.49  TD CO/CI: 2.7/1.26 Right sided filling pressures are normal. Left sided filling pressures are moderately elevated. Moderately elevated pulmonary artery hypertension. Reduced cardiac output level.    Assessment and Plan:   Mr. Kavon Banerjee is a 56yr old male with a longstanding history of chronic systolic heart failure secondary to NICM (LVEF < 10%, LVEDD 8.0cm) c/b recent cardiogenic shock (hospitalized at OSH, required inotropic support, left AMA 11/1) and polysubstance abuse (alcohol, meth). He presents to [facility] for hospital follow up at euvolemic state.     Acute on Chronic Systolic Heart Failure secondary to NICM. Secondary to polysubstance abuse (meth and ETOH). Complicated by cardiogenic shock. NCA per Angiogram 10/26/21 at OSH.   Stage C, NYHA Class III  ACEi/ARB Entresto 24/26 mg po BID resumed. Hold Hydralazine. Consider up titration of Entresto in 1 week with repeat BMP at that time.   BB contraindicated due to low cardiac output. Milrinone at 0.375 mg/kg/min  Aldosterone antagonist deferred while other medical therapy is prioritized  SCD prophylaxis: Given current inotrope support informed pt he is to wear his lifevest at all times, which he is not wearing currently. He is open to ICD implantation.   Fluid status: Euvolemic. Continue Torsemide 60 mg po BID.   - Currently monitoring on home inotropes with advanced therapy workup underway.   - Recommended dental follow up with [facility] if needed given current  inotropes.     Gout.   - Follow up with Rheumatology and Surgery as prior.      Polysubstance Abuse. History of cocaine, meth, mushrooms, LSD, and ETOH abuse. Drug and peth negative 11/2, but admits to sipping ETOH when out with friends.   - Reiterated complete abstinence and need for CD treatment prior to advanced therapy candidacy.      DM Type II, controlled. Hgb A1C-6.7.  - Dietary management.     Follow up BMP in 1 week and CORE in 2 weeks.       BRITTANY Patel CNP  12/19/2021          CC  SELF, REFERRED      Please do not hesitate to contact me if you have any questions/concerns.     Sincerely,     BRITTANY Patel CNP

## 2021-12-20 NOTE — PATIENT INSTRUCTIONS
Take your medicines every day, as directed    Changes made today:  1. Stop hydralazine (but do not stop your Hydralazine until you have picked up Entresto)  2. Please start Entresto 24/26mg twice daily.  3. Please have labs 1 week by home care - 12/28/21 Tuesday.      Monitor Your Weight and Symptoms    Contact us if you:      Gain 2 pounds in one day or 5 pounds in one week    Feel more short of breath    Notice more leg swelling    Feel lightheadeded   Change your lifestyle    Limit Salt or Sodium:    2000 mg  Limit Fluids:    2000 mL or approximately 64 ounces  Eat a Heart Healthy Diet    Low in saturated fats  Stay Active:    Aim to move at least 150 minutes every  week         To Contact us    During Business Hours:  [phone], option # 1 ([facility])  Then option # 4 (medical questions)     After hours, weekends or holidays:   [phone], Option #4  Ask to speak to the On-Call Cardiologist. Inform them you are a CORE/heart failure patient at the [facility].     Use [email or web address] allows you to communicate directly with your heart team through secure messaging.    [employer] can be accessed any time on your phone, computer, or tablet.    If you need assistance, we'd be happy to help!         Keep your Heart Appointments:    1. Return CORE appt in 2-3 weeks with labs prior     2. Return Heart Failure with Dr. Franklin end of January with labs.

## 2021-12-20 NOTE — TELEPHONE ENCOUNTER
Called Kavon to follow up on his labs from today. I let him know that liver function remains stable.    Gianna Gamino RN

## 2021-12-20 NOTE — LETTER
Date:December 21, 2021      Patient was self referred, no letter generated. Do not send.        [facility] Health Information

## 2021-12-20 NOTE — NURSING NOTE
Chief Complaint   Patient presents with     Follow Up     Return CORE, 56 year old male with systolic hf, Ef <10%, NICM, now on home inotropes for follow up with labs prior     Vitals were taken and medications reconciled.    Sumit Arizmendi, EMT  10:22 AM

## 2021-12-22 NOTE — PROGRESS NOTES
NAME  Kavon Banerjee    MRN  [id number]      [birth year]     AGE  56     SEX  Male     HANDEDNESS Right     EDUCATION 14     DUNN  21     PROVIDER  [employer]  KB     STATION  OP            ORIENTATION      Time  0     Place      Personal Info.     Presidents             WRAT-4         SS %ile GE   Word Reading 122 93 >12.9          WASI-II       FSIQ: 108        Raw T    Vocabulary  44 57    Matrix Reasoning 20 52           WAIS-IV         Raw SSa    Coding  68 11            COWAT   FAS   Raw 49      SS 12      T 55              BOSTON NAMING TEST     Raw 59 /60     SS 14      T 61             FINGER TAPPING       Avg SSa T    RH 66.33 16 73    LH 59.67 16 72           TRAIL MAKING TEST       Time Errors SSa T   A 19 0 13 64   B 60 0 11 53          TSAT        Total z     Time 63 0.91     Errors 0 0.92             WMS-IV LOGICAL MEMORY YA    Raw SSa %ile    LM I 33 13     LM II 35 15     LM Rec. 30  >75    VR I 41 13     VR II 36 14     VR Rec. 7  >75            AVLT <56   1   T1 T2 T3 T4 T5   9 12 14 15 15   TB T6      9 15        Raw M(SD)    Trial 5  15 12.1 (2.1)    Short Delay Recall 15 9.9 (2.8)    30' Recall  14 9.9 (3.2)    30' Recog Hits/FPs 15/0 13.9 (1.4)           BDI-II       Raw 20      Interp. Moderate             HOLLI       Raw 15      Interp. Mild             MMPI-3       RCd 53 CRIN 48    RC1 46 VRIN 55    RC2 60 BRITTANIE 50      F 44    RC4 50 Fp 50    RC6 50 Fs 53    RC7 44 FBS 56    RC8 52 RBS 43    RC9 44 L 48      K 50           LIN-O COMPLEX FIGURE       Raw T %ile   Time to Copy 129  >16   Copy  33  >16
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
## 2021-12-29 NOTE — TELEPHONE ENCOUNTER
Patient said he was seen by Dr. Weiss at the hospital  where they discussed referring him to a hand surgeon (Dr. Canela). Patient is wondering if rheum appt is necessary (scheduled for 03/18), or if he can get the referral to see Dr. Canela. Patient is unsure of next steps and would like clarification.

## 2021-12-30 NOTE — TELEPHONE ENCOUNTER
Labs revewed by Hillary Tabor and called Kavon to review. Following verbal orders given:  Date: 12/30/2021    Time of Call: 4:05 PM     Diagnosis:  HF     [ VORB ] Ordering provider: Hillary Tabor NP  Order: Increase Potassium to 40 meq in AM, 20 meq in PM.      Order received by: Jenna Sharif RN      Follow-up/additional notes: Reviewed with Kavon who verbalized understanding.

## 2022-01-01 ENCOUNTER — HOME INFUSION (PRE-WILLOW HOME INFUSION) (OUTPATIENT)
Dept: PHARMACY | Facility: CLINIC | Age: 57
End: 2022-01-01

## 2022-01-01 ENCOUNTER — TELEPHONE (OUTPATIENT)
Dept: CARDIOLOGY | Facility: CLINIC | Age: 57
End: 2022-01-01
Payer: MEDICARE

## 2022-01-01 ENCOUNTER — HOME INFUSION (PRE-WILLOW HOME INFUSION) (OUTPATIENT)
Dept: PHARMACY | Facility: CLINIC | Age: 57
End: 2022-01-01
Payer: MEDICARE

## 2022-01-01 DIAGNOSIS — M1A.0390 CHRONIC GOUT OF WRIST, UNSPECIFIED CAUSE, UNSPECIFIED LATERALITY: ICD-10-CM

## 2022-01-01 DIAGNOSIS — I50.23 ACUTE ON CHRONIC SYSTOLIC CONGESTIVE HEART FAILURE (H): ICD-10-CM

## 2022-01-01 DIAGNOSIS — K21.9 GASTROESOPHAGEAL REFLUX DISEASE, UNSPECIFIED WHETHER ESOPHAGITIS PRESENT: ICD-10-CM

## 2022-01-01 RX ORDER — ISOSORBIDE MONONITRATE 120 MG/1
120 TABLET, EXTENDED RELEASE ORAL EVERY MORNING
Qty: 30 TABLET | Refills: 0 | Status: SHIPPED | OUTPATIENT
Start: 2022-01-01

## 2022-01-01 RX ORDER — PANTOPRAZOLE SODIUM 40 MG/1
40 TABLET, DELAYED RELEASE ORAL
Qty: 30 TABLET | Refills: 0 | Status: SHIPPED | OUTPATIENT
Start: 2022-01-01 | End: 2022-01-01

## 2022-01-01 RX ORDER — ALLOPURINOL 300 MG/1
300 TABLET ORAL DAILY
Qty: 1 TABLET | Refills: 0 | Status: SHIPPED | OUTPATIENT
Start: 2022-01-01

## 2022-01-01 RX ORDER — ALLOPURINOL 300 MG/1
300 TABLET ORAL DAILY
Qty: 30 TABLET | Refills: 0 | Status: SHIPPED | OUTPATIENT
Start: 2022-01-01 | End: 2022-01-01

## 2022-01-01 RX ORDER — SPIRONOLACTONE 25 MG/1
25 TABLET ORAL DAILY
Qty: 30 TABLET | Refills: 0 | Status: SHIPPED | OUTPATIENT
Start: 2022-01-01

## 2022-01-01 RX ORDER — PANTOPRAZOLE SODIUM 40 MG/1
40 TABLET, DELAYED RELEASE ORAL
Qty: 30 TABLET | Refills: 0 | Status: SHIPPED | OUTPATIENT
Start: 2022-01-01

## 2022-01-04 NOTE — TELEPHONE ENCOUNTER
Return call placed to patient to explain that he does need to be seen in the rheumatology clinic for gout management and that he should discuss a referral for hand surgery with his PCP. Detailed voice mail message left for patient with above information.  Debora Mckenzie RN  Adult Rheumatology Clinic

## 2022-01-05 NOTE — TELEPHONE ENCOUNTER
"Return call placed to patient, who reports he is scheduled to see Dr. Canela at the [facility] on 1/31/21. He reports increased swelling in his left wrist, denies warmth or redness and states \"It doesn't feel like a gout flare to me, but I think there is fluid that needs to be removed\". Instructed patient to contact orthopedic clinic to request an earlier appointment for possible joint aspiration. Confirmed appointment with Dr. Weiss on 3/18/21.  Debora Mckenzie RN  Adult Rheumatology Clinic     "

## 2022-01-05 NOTE — TELEPHONE ENCOUNTER
"I found progress notes from November 10 and November 11 in which doctors Adela and KEZIA both saw the patient in the hospital.  The most recent evaluation was from Dr. Chapman, in which there was agreement that the wrist nodules on the patient's hands most likely represented ganglion cysts rather than tophi.  Uric acid was 5.5 and patient was on an appropriate dose of allopurinol.  There was agreement that referral to hand surgery would be reasonable.  I do not think the patient needs to be seen in rheumatology clinic prior to hand surgery referral.  I do not know of a Dr. Canela who is a hand surgeon.  I recommend referral to Dr. Raj Fontana for \"wrist ganglion cyst\".     I recommend the patient continue to use allopurinol as directed.  If he is not having inflammatory arthritis symptoms, the March 18 follow-up in rheumatology that is currently scheduled is sufficient.  Thank you  "

## 2022-01-07 NOTE — TELEPHONE ENCOUNTER
Returned phone call to Kavon. Patient needs refills on several medications. Patient prescribed 30 day supply of isosorbide mononitrate, pantoprazole, and spironolactone. He needs to reach out to PCP or psych regarding refills for lexapro.     I told patient he needs to get an appointment rescheduled for next week. The soonest he would schedule for is 2/2 for [facility] Return, lab appointment prior. He said he might be out of town next week or was worried about having appointments too early in the day because of the roads. I asked him to call in early next week if he is able to come to an appointment and we could help get him scheduled. I told him we would prefer to see him sooner than 2/2.    Gianna Gamino RN

## 2022-01-07 NOTE — TELEPHONE ENCOUNTER
[facility] Call Center    Phone Message    May a detailed message be left on voicemail: yes     Reason for Call: Other: pt said he would like to talk to someone about his meds and get a refill. he left a [employer] message     Action Taken: Other: cardio    Travel Screening: Not Applicable

## 2022-01-12 NOTE — PROGRESS NOTES
This is a recent snapshot of the patient's [facility] medical record.  For current drug dose and complete information and questions, call [phone] or In Basket pool, fv home infusion ([id number])  CSN Number:  [id number]

## 2022-01-18 NOTE — TELEPHONE ENCOUNTER
[facility] Call Center    Phone Message    May a detailed message be left on voicemail: no     Reason for Call: Other: Cielo called from [facility] to report Pt Kavon is currently in [place]. Kavon's mom is shipping his IV bag of Milrinone to him, but he's missed 2 dressing changes as of 1/18/2022.     Action Taken: Message routed to:  [facility] ([facility]): Cardiology    Travel Screening: Not Applicable

## 2022-01-19 NOTE — TELEPHONE ENCOUNTER
Called Brandi phone to check in, goes straight to voicemail and the voicemail does not have his name on it, so did not leave message.

## 2022-01-19 NOTE — TELEPHONE ENCOUNTER
Called Cielo back. Kavon has been in [place] and they have not been able to get hold of him. Per Cielo she talked to his mother yesterday who has been shipping him his bags of Milrinone so he doesn't run out but she was concerned because she couldn't get hold of him all day yesterday either. Phone is going right to voicemail.   He has missed 2 dressing changes so far. Mom does not know when he plans to return.     Has [facility] follow up on 2/2. Asked Cielo to update us if she hears any updates.   Jenna Sharif RN

## 2022-01-20 NOTE — TELEPHONE ENCOUNTER
Tried to call Kavon again. Goes straight to [employer]. Did not leave message as name on [employer] is not Kavon's. I did send [employer] message as well in case he is reading those.

## 2022-02-06 ENCOUNTER — HEALTH MAINTENANCE LETTER (OUTPATIENT)
Age: 57
End: 2022-02-06

## 2022-02-08 ENCOUNTER — DOCUMENTATION ONLY (OUTPATIENT)
Dept: TRANSPLANT | Facility: CLINIC | Age: 57
End: 2022-02-08
Payer: MEDICARE

## 2022-02-09 NOTE — PROGRESS NOTES
This is a recent snapshot of the patient's [facility] medical record.  For current drug dose and complete information and questions, call [phone] or In Basket pool, fv home infusion ([id number])  CSN Number:  [id number]

## 2022-02-10 NOTE — PROGRESS NOTES
This is a recent snapshot of the patient's [facility] medical record.  For current drug dose and complete information and questions, call [phone] or In Basket pool, fv home infusion ([id number])  CSN Number:  [id number]

## 2022-02-10 NOTE — PROGRESS NOTES
This is a recent snapshot of the patient's [facility] medical record.  For current drug dose and complete information and questions, call [phone] or In Basket pool, [facility]v home infusion ([id number])  CSN Number:  [id number]

## 2022-02-11 NOTE — PROGRESS NOTES
This is a recent snapshot of the patient's [facility] medical record.  For current drug dose and complete information and questions, call [phone] or In Basket pool, fv home infusion ([id number])  CSN Number:  [id number]

## 2022-03-04 NOTE — PROGRESS NOTES
This is a recent snapshot of the patient's [facility] medical record.  For current drug dose and complete information and questions, call [phone] or In Basket pool, fv home infusion ([id number])  CSN Number:  [id number]

## 2022-03-10 NOTE — PROGRESS NOTES
This is a recent snapshot of the patient's [facility] medical record.  For current drug dose and complete information and questions, call [phone] or In Basket pool, fv home infusion ([id number])  CSN Number:  [id number]

## 2022-03-24 ENCOUNTER — HOME INFUSION (PRE-WILLOW HOME INFUSION) (OUTPATIENT)
Dept: PHARMACY | Facility: CLINIC | Age: 57
End: 2022-03-24

## 2022-04-15 NOTE — PROGRESS NOTES
This is a recent snapshot of the patient's [facility] medical record.  For current drug dose and complete information and questions, call [phone] or In Basket pool, fv home infusion ([id number])  CSN Number:  [id number]

## 2022-04-25 NOTE — PROGRESS NOTES
This is a recent snapshot of the patient's [facility] medical record.  For current drug dose and complete information and questions, call [phone] or In Basket pool, fv home infusion ([id number])  CSN Number:  [id number]

## 2022-04-26 NOTE — PROGRESS NOTES
This is a recent snapshot of the patient's [facility] medical record.  For current drug dose and complete information and questions, call [phone] or In Basket pool, fv home infusion ([id number])  CSN Number:  [id number]

## 2022-04-26 NOTE — PROGRESS NOTES
This is a recent snapshot of the patient's [facility] medical record.  For current drug dose and complete information and questions, call [phone] or In Basket pool, fv home infusion ([id number])  CSN Number:  [id number]

## 2022-04-28 NOTE — PROGRESS NOTES
This is a recent snapshot of the patient's [facility] medical record.  For current drug dose and complete information and questions, call [phone] or In Basket pool, fv home infusion ([id number])  CSN Number:  [id number]

## 2022-05-05 NOTE — PROGRESS NOTES
This is a recent snapshot of the patient's [facility] medical record.  For current drug dose and complete information and questions, call [phone] or In Basket pool, fv home infusion ([id number])  CSN Number:  [id number]

## 2022-05-10 NOTE — PROGRESS NOTES
This is a recent snapshot of the patient's [facility] medical record.  For current drug dose and complete information and questions, call [phone] or In Basket pool, fv home infusion ([id number])  CSN Number:  [id number]

## 2022-05-10 NOTE — PROGRESS NOTES
This is a recent snapshot of the patient's [facility] medical record.  For current drug dose and complete information and questions, call [phone] or In Basket pool, fv home infusion ([id number])  CSN Number:  [id number]

## 2022-06-22 NOTE — PROGRESS NOTES
This is a recent snapshot of the patient's [facility] medical record.  For current drug dose and complete information and questions, call [phone] or In Basket pool, fv home infusion ([id number])  CSN Number:  [id number]

## 2022-06-29 NOTE — PROGRESS NOTES
This is a recent snapshot of the patient's [facility] medical record.  For current drug dose and complete information and questions, call [phone] or In Basket pool, fv home infusion ([id number])  CSN Number:  [id number]

## 2022-11-14 NOTE — PROGRESS NOTES
This is a recent snapshot of the patient's [facility] medical record.  For current drug dose and complete information and questions, call [phone] or In Basket pool, fv home infusion ([id number])  CSN Number:  [id number]

## (undated) DEVICE — Device

## (undated) DEVICE — INTRODUCER SHEATH FAST-CATH CATH-LOCK 7FRX12CM 406702

## (undated) DEVICE — INTRO SHEATH 7FRX10CM PINNACLE RSS702

## (undated) DEVICE — MANIFOLD KIT ANGIO AUTOMATED 014613

## (undated) DEVICE — KIT RIGHT HEART CATH 60130719

## (undated) DEVICE — WIRE GUIDE 0.025"X150CM EMERALD J TIP 502524

## (undated) DEVICE — SLEEVE REPOSITIONING W/CATH LOCK 60CM 406503

## (undated) DEVICE — PACK HEART RIGHT CUSTOM SAN32RHF18

## (undated) RX ORDER — ACETAMINOPHEN 325 MG/1
TABLET ORAL
Status: DISPENSED
Start: 2021-01-01

## (undated) RX ORDER — FENTANYL CITRATE 50 UG/ML
INJECTION, SOLUTION INTRAMUSCULAR; INTRAVENOUS
Status: DISPENSED
Start: 2021-01-01